# Patient Record
Sex: MALE | Race: WHITE | NOT HISPANIC OR LATINO | URBAN - METROPOLITAN AREA
[De-identification: names, ages, dates, MRNs, and addresses within clinical notes are randomized per-mention and may not be internally consistent; named-entity substitution may affect disease eponyms.]

---

## 2017-01-24 ENCOUNTER — OUTPATIENT (OUTPATIENT)
Dept: OUTPATIENT SERVICES | Facility: HOSPITAL | Age: 82
LOS: 1 days | End: 2017-01-24
Payer: COMMERCIAL

## 2017-01-24 ENCOUNTER — APPOINTMENT (OUTPATIENT)
Dept: CT IMAGING | Facility: CLINIC | Age: 82
End: 2017-01-24

## 2017-01-24 DIAGNOSIS — R63.4 ABNORMAL WEIGHT LOSS: ICD-10-CM

## 2017-01-24 PROCEDURE — 74176 CT ABD & PELVIS W/O CONTRAST: CPT

## 2017-02-11 ENCOUNTER — APPOINTMENT (OUTPATIENT)
Dept: MRI IMAGING | Facility: CLINIC | Age: 82
End: 2017-02-11

## 2017-02-11 ENCOUNTER — OUTPATIENT (OUTPATIENT)
Dept: OUTPATIENT SERVICES | Facility: HOSPITAL | Age: 82
LOS: 1 days | End: 2017-02-11
Payer: COMMERCIAL

## 2017-02-11 DIAGNOSIS — K86.9 DISEASE OF PANCREAS, UNSPECIFIED: ICD-10-CM

## 2017-02-11 PROCEDURE — 74182 MRI ABDOMEN W/CONTRAST: CPT

## 2017-02-11 PROCEDURE — A9585: CPT

## 2017-02-11 PROCEDURE — 74183 MRI ABD W/O CNTR FLWD CNTR: CPT

## 2017-02-11 PROCEDURE — 82565 ASSAY OF CREATININE: CPT

## 2017-03-01 ENCOUNTER — APPOINTMENT (OUTPATIENT)
Dept: SURGICAL ONCOLOGY | Facility: CLINIC | Age: 82
End: 2017-03-01

## 2017-03-01 VITALS
RESPIRATION RATE: 18 BRPM | SYSTOLIC BLOOD PRESSURE: 188 MMHG | WEIGHT: 130 LBS | DIASTOLIC BLOOD PRESSURE: 76 MMHG | HEIGHT: 67 IN | HEART RATE: 54 BPM | BODY MASS INDEX: 20.4 KG/M2 | OXYGEN SATURATION: 98 %

## 2017-03-01 DIAGNOSIS — Z87.39 PERSONAL HISTORY OF OTHER DISEASES OF THE MUSCULOSKELETAL SYSTEM AND CONNECTIVE TISSUE: ICD-10-CM

## 2017-03-01 DIAGNOSIS — Z80.3 FAMILY HISTORY OF MALIGNANT NEOPLASM OF BREAST: ICD-10-CM

## 2017-03-01 DIAGNOSIS — N40.0 BENIGN PROSTATIC HYPERPLASIA WITHOUT LOWER URINARY TRACT SYMPMS: ICD-10-CM

## 2017-03-01 DIAGNOSIS — E03.9 HYPOTHYROIDISM, UNSPECIFIED: ICD-10-CM

## 2017-03-01 DIAGNOSIS — Z87.891 PERSONAL HISTORY OF NICOTINE DEPENDENCE: ICD-10-CM

## 2017-03-01 DIAGNOSIS — N18.9 CHRONIC KIDNEY DISEASE, UNSPECIFIED: ICD-10-CM

## 2017-03-01 DIAGNOSIS — N28.89 OTHER SPECIFIED DISORDERS OF KIDNEY AND URETER: ICD-10-CM

## 2017-03-01 RX ORDER — GABAPENTIN 100 MG/1
100 CAPSULE ORAL
Refills: 0 | Status: ACTIVE | COMMUNITY

## 2017-03-01 RX ORDER — DONEPEZIL HYDROCHLORIDE 5 MG/1
5 TABLET ORAL
Refills: 0 | Status: ACTIVE | COMMUNITY

## 2017-03-01 RX ORDER — TAMSULOSIN HYDROCHLORIDE 0.4 MG/1
CAPSULE ORAL
Refills: 0 | Status: ACTIVE | COMMUNITY

## 2017-03-01 RX ORDER — CARVEDILOL 3.12 MG/1
TABLET, FILM COATED ORAL
Refills: 0 | Status: ACTIVE | COMMUNITY

## 2017-03-01 RX ORDER — FEBUXOSTAT 40 MG/1
40 TABLET ORAL
Refills: 0 | Status: ACTIVE | COMMUNITY

## 2017-03-01 RX ORDER — BETHANECHOL CHLORIDE 50 MG/1
TABLET ORAL
Refills: 0 | Status: ACTIVE | COMMUNITY

## 2017-03-01 RX ORDER — LEVOTHYROXINE SODIUM 0.05 MG/1
50 TABLET ORAL
Refills: 0 | Status: ACTIVE | COMMUNITY

## 2017-04-10 ENCOUNTER — OUTPATIENT (OUTPATIENT)
Dept: OUTPATIENT SERVICES | Facility: HOSPITAL | Age: 82
LOS: 1 days | End: 2017-04-10
Payer: COMMERCIAL

## 2017-04-10 VITALS
WEIGHT: 136.69 LBS | HEART RATE: 58 BPM | RESPIRATION RATE: 18 BRPM | DIASTOLIC BLOOD PRESSURE: 66 MMHG | HEIGHT: 66 IN | TEMPERATURE: 97 F | SYSTOLIC BLOOD PRESSURE: 144 MMHG

## 2017-04-10 DIAGNOSIS — Z01.818 ENCOUNTER FOR OTHER PREPROCEDURAL EXAMINATION: ICD-10-CM

## 2017-04-10 DIAGNOSIS — K86.9 DISEASE OF PANCREAS, UNSPECIFIED: ICD-10-CM

## 2017-04-10 DIAGNOSIS — Z98.890 OTHER SPECIFIED POSTPROCEDURAL STATES: Chronic | ICD-10-CM

## 2017-04-10 DIAGNOSIS — I10 ESSENTIAL (PRIMARY) HYPERTENSION: ICD-10-CM

## 2017-04-10 DIAGNOSIS — C67.9 MALIGNANT NEOPLASM OF BLADDER, UNSPECIFIED: ICD-10-CM

## 2017-04-10 LAB
ALBUMIN SERPL ELPH-MCNC: 4.2 G/DL — SIGNIFICANT CHANGE UP (ref 3.3–5.2)
ALP SERPL-CCNC: 55 U/L — SIGNIFICANT CHANGE UP (ref 40–120)
ALT FLD-CCNC: 7 U/L — SIGNIFICANT CHANGE UP
ANION GAP SERPL CALC-SCNC: 12 MMOL/L — SIGNIFICANT CHANGE UP (ref 5–17)
APTT BLD: 34.5 SEC — SIGNIFICANT CHANGE UP (ref 27.5–37.4)
AST SERPL-CCNC: 13 U/L — SIGNIFICANT CHANGE UP
BILIRUB SERPL-MCNC: 0.6 MG/DL — SIGNIFICANT CHANGE UP (ref 0.4–2)
BLD GP AB SCN SERPL QL: SIGNIFICANT CHANGE UP
BUN SERPL-MCNC: 29 MG/DL — HIGH (ref 8–20)
CALCIUM SERPL-MCNC: 9.9 MG/DL — SIGNIFICANT CHANGE UP (ref 8.6–10.2)
CHLORIDE SERPL-SCNC: 102 MMOL/L — SIGNIFICANT CHANGE UP (ref 98–107)
CO2 SERPL-SCNC: 29 MMOL/L — SIGNIFICANT CHANGE UP (ref 22–29)
CREAT SERPL-MCNC: 1.39 MG/DL — HIGH (ref 0.5–1.3)
GLUCOSE SERPL-MCNC: 118 MG/DL — HIGH (ref 70–115)
HCT VFR BLD CALC: 44.2 % — SIGNIFICANT CHANGE UP (ref 42–52)
HGB BLD-MCNC: 14.3 G/DL — SIGNIFICANT CHANGE UP (ref 14–18)
INR BLD: 1.06 RATIO — SIGNIFICANT CHANGE UP (ref 0.88–1.16)
MCHC RBC-ENTMCNC: 27.4 PG — SIGNIFICANT CHANGE UP (ref 27–31)
MCHC RBC-ENTMCNC: 32.4 G/DL — SIGNIFICANT CHANGE UP (ref 32–36)
MCV RBC AUTO: 84.7 FL — SIGNIFICANT CHANGE UP (ref 80–94)
PLATELET # BLD AUTO: 102 K/UL — LOW (ref 150–400)
POTASSIUM SERPL-MCNC: 3.9 MMOL/L — SIGNIFICANT CHANGE UP (ref 3.5–5.3)
POTASSIUM SERPL-SCNC: 3.9 MMOL/L — SIGNIFICANT CHANGE UP (ref 3.5–5.3)
PROT SERPL-MCNC: 7.5 G/DL — SIGNIFICANT CHANGE UP (ref 6.6–8.7)
PROTHROM AB SERPL-ACNC: 11.7 SEC — SIGNIFICANT CHANGE UP (ref 9.8–12.7)
RBC # BLD: 5.22 M/UL — SIGNIFICANT CHANGE UP (ref 4.6–6.2)
RBC # FLD: 14.2 % — SIGNIFICANT CHANGE UP (ref 11–15.6)
SODIUM SERPL-SCNC: 143 MMOL/L — SIGNIFICANT CHANGE UP (ref 135–145)
TYPE + AB SCN PNL BLD: SIGNIFICANT CHANGE UP
WBC # BLD: 7.8 K/UL — SIGNIFICANT CHANGE UP (ref 4.8–10.8)
WBC # FLD AUTO: 7.8 K/UL — SIGNIFICANT CHANGE UP (ref 4.8–10.8)

## 2017-04-10 PROCEDURE — 93010 ELECTROCARDIOGRAM REPORT: CPT

## 2017-04-10 NOTE — H&P PST ADULT - HISTORY OF PRESENT ILLNESS
This is a 89 y.o male who presents to PST today accompanied by his daughter.  The pt is alert, but is confused at times, therefore, his daughter is in attendance and assisting the patient in answering questions.  As per the pt's daughter, the pt sustained a substantial weight loss of 25-30 lbs over a few months accompanied by abdominal pain.   A cat scan demonstrated a pancreatic mass.

## 2017-04-10 NOTE — H&P PST ADULT - EKG AND INTERPRETATION
EKG demonstrates Sinus Bradycardia at 50bpm with first degree AV block.  Q waves noted in V1-V3.  Pending official reading

## 2017-04-10 NOTE — H&P PST ADULT - RS GEN PE MLT RESP DETAILS PC
airway patent/diminished breath sounds, R/normal/respirations non-labored/diminished breath sounds, L

## 2017-04-10 NOTE — H&P PST ADULT - ATTENDING COMMENTS
Planned procedure including risks and benefits discussed with patient.    Past Medical History:  Bladder cancer  H/O 2013  Dementia    Hypertension    Hypertrophy of prostate    Hypothyroidism    Pneumonia    Renal cyst    Risk factors for obstructive sleep apnea.    Past Surgical History:  H/O colonoscopy    H/O cystoscopy  Bladder Cancer 2013  H/O elbow surgery  As a child  H/O hernia repair  Inguinal Hernia surgery many years ago.

## 2017-04-10 NOTE — H&P PST ADULT - PMH
Bladder cancer  H/O 2013  Dementia    Hypertension    Hypertrophy of prostate    Hypothyroidism    Pneumonia    Renal cyst    Risk factors for obstructive sleep apnea

## 2017-04-10 NOTE — H&P PST ADULT - PROBLEM SELECTOR PLAN 1
Laparoscopic Robotic Assisted Distal, Pancreatectomy, Splenectomy, Possible Open   Medical Clearance

## 2017-04-10 NOTE — H&P PST ADULT - NSANTHOSAYNRD_GEN_A_CORE
No. JASMYN screening performed.  STOP BANG Legend: 0-2 = LOW Risk; 3-4 = INTERMEDIATE Risk; 5-8 = HIGH Risk

## 2017-04-10 NOTE — H&P PST ADULT - PSH
H/O colonoscopy    H/O cystoscopy  Bladder Cancer 2013  H/O elbow surgery  As a child  H/O hernia repair  Inguinal Hernia surgery many years ago

## 2017-04-11 DIAGNOSIS — Z01.818 ENCOUNTER FOR OTHER PREPROCEDURAL EXAMINATION: ICD-10-CM

## 2017-04-11 DIAGNOSIS — K86.9 DISEASE OF PANCREAS, UNSPECIFIED: ICD-10-CM

## 2017-04-23 ENCOUNTER — RESULT REVIEW (OUTPATIENT)
Age: 82
End: 2017-04-23

## 2017-04-24 ENCOUNTER — APPOINTMENT (OUTPATIENT)
Dept: SURGICAL ONCOLOGY | Facility: HOSPITAL | Age: 82
End: 2017-04-24

## 2017-04-24 ENCOUNTER — INPATIENT (INPATIENT)
Facility: HOSPITAL | Age: 82
LOS: 3 days | Discharge: HOME | DRG: 424 | End: 2017-04-28
Attending: SPECIALIST | Admitting: SPECIALIST
Payer: COMMERCIAL

## 2017-04-24 VITALS
OXYGEN SATURATION: 98 % | SYSTOLIC BLOOD PRESSURE: 159 MMHG | HEART RATE: 60 BPM | HEIGHT: 65 IN | DIASTOLIC BLOOD PRESSURE: 66 MMHG | RESPIRATION RATE: 16 BRPM | TEMPERATURE: 99 F | WEIGHT: 139.99 LBS

## 2017-04-24 DIAGNOSIS — Z90.81 ACQUIRED ABSENCE OF SPLEEN: Chronic | ICD-10-CM

## 2017-04-24 DIAGNOSIS — Z98.890 OTHER SPECIFIED POSTPROCEDURAL STATES: Chronic | ICD-10-CM

## 2017-04-24 DIAGNOSIS — K86.9 DISEASE OF PANCREAS, UNSPECIFIED: ICD-10-CM

## 2017-04-24 LAB
BLD GP AB SCN SERPL QL: SIGNIFICANT CHANGE UP
TYPE + AB SCN PNL BLD: SIGNIFICANT CHANGE UP

## 2017-04-24 PROCEDURE — 38747 REMOVE ABDOMINAL LYMPH NODES: CPT | Mod: AS,59

## 2017-04-24 PROCEDURE — G0463: CPT

## 2017-04-24 PROCEDURE — 86900 BLOOD TYPING SEROLOGIC ABO: CPT

## 2017-04-24 PROCEDURE — 80053 COMPREHEN METABOLIC PANEL: CPT

## 2017-04-24 PROCEDURE — 82150 ASSAY OF AMYLASE: CPT

## 2017-04-24 PROCEDURE — 88309 TISSUE EXAM BY PATHOLOGIST: CPT | Mod: 26

## 2017-04-24 PROCEDURE — 85730 THROMBOPLASTIN TIME PARTIAL: CPT

## 2017-04-24 PROCEDURE — 83690 ASSAY OF LIPASE: CPT

## 2017-04-24 PROCEDURE — 86920 COMPATIBILITY TEST SPIN: CPT

## 2017-04-24 PROCEDURE — 49204: CPT | Mod: AS

## 2017-04-24 PROCEDURE — S2900 ROBOTIC SURGICAL SYSTEM: CPT | Mod: NC

## 2017-04-24 PROCEDURE — 48140 PARTIAL REMOVAL OF PANCREAS: CPT | Mod: AS

## 2017-04-24 PROCEDURE — 86901 BLOOD TYPING SEROLOGIC RH(D): CPT

## 2017-04-24 PROCEDURE — 48140 PARTIAL REMOVAL OF PANCREAS: CPT

## 2017-04-24 PROCEDURE — 85610 PROTHROMBIN TIME: CPT

## 2017-04-24 PROCEDURE — 38747 REMOVE ABDOMINAL LYMPH NODES: CPT | Mod: 59

## 2017-04-24 PROCEDURE — 86850 RBC ANTIBODY SCREEN: CPT

## 2017-04-24 PROCEDURE — 93005 ELECTROCARDIOGRAM TRACING: CPT

## 2017-04-24 PROCEDURE — 83735 ASSAY OF MAGNESIUM: CPT

## 2017-04-24 PROCEDURE — 84100 ASSAY OF PHOSPHORUS: CPT

## 2017-04-24 PROCEDURE — 49204: CPT

## 2017-04-24 PROCEDURE — 85027 COMPLETE CBC AUTOMATED: CPT

## 2017-04-24 RX ORDER — CEFAZOLIN SODIUM 1 G
2000 VIAL (EA) INJECTION ONCE
Qty: 0 | Refills: 0 | Status: COMPLETED | OUTPATIENT
Start: 2017-04-24 | End: 2017-04-24

## 2017-04-24 RX ORDER — GABAPENTIN 400 MG/1
300 CAPSULE ORAL THREE TIMES A DAY
Qty: 0 | Refills: 0 | Status: DISCONTINUED | OUTPATIENT
Start: 2017-04-24 | End: 2017-04-28

## 2017-04-24 RX ORDER — TAMSULOSIN HYDROCHLORIDE 0.4 MG/1
0.8 CAPSULE ORAL AT BEDTIME
Qty: 0 | Refills: 0 | Status: DISCONTINUED | OUTPATIENT
Start: 2017-04-24 | End: 2017-04-28

## 2017-04-24 RX ORDER — FENTANYL CITRATE 50 UG/ML
25 INJECTION INTRAVENOUS
Qty: 0 | Refills: 0 | Status: DISCONTINUED | OUTPATIENT
Start: 2017-04-24 | End: 2017-04-24

## 2017-04-24 RX ORDER — SODIUM CHLORIDE 9 MG/ML
1000 INJECTION, SOLUTION INTRAVENOUS
Qty: 0 | Refills: 0 | Status: DISCONTINUED | OUTPATIENT
Start: 2017-04-24 | End: 2017-04-24

## 2017-04-24 RX ORDER — DONEPEZIL HYDROCHLORIDE 10 MG/1
5 TABLET, FILM COATED ORAL AT BEDTIME
Qty: 0 | Refills: 0 | Status: DISCONTINUED | OUTPATIENT
Start: 2017-04-24 | End: 2017-04-28

## 2017-04-24 RX ORDER — METOPROLOL TARTRATE 50 MG
5 TABLET ORAL EVERY 6 HOURS
Qty: 0 | Refills: 0 | Status: DISCONTINUED | OUTPATIENT
Start: 2017-04-24 | End: 2017-04-24

## 2017-04-24 RX ORDER — SODIUM CHLORIDE 9 MG/ML
1000 INJECTION, SOLUTION INTRAVENOUS
Qty: 0 | Refills: 0 | Status: DISCONTINUED | OUTPATIENT
Start: 2017-04-24 | End: 2017-04-27

## 2017-04-24 RX ORDER — ONDANSETRON 8 MG/1
4 TABLET, FILM COATED ORAL ONCE
Qty: 0 | Refills: 0 | Status: DISCONTINUED | OUTPATIENT
Start: 2017-04-24 | End: 2017-04-24

## 2017-04-24 RX ORDER — SODIUM CHLORIDE 9 MG/ML
3 INJECTION INTRAMUSCULAR; INTRAVENOUS; SUBCUTANEOUS EVERY 8 HOURS
Qty: 0 | Refills: 0 | Status: DISCONTINUED | OUTPATIENT
Start: 2017-04-24 | End: 2017-04-24

## 2017-04-24 RX ORDER — CARVEDILOL PHOSPHATE 80 MG/1
6.25 CAPSULE, EXTENDED RELEASE ORAL EVERY 12 HOURS
Qty: 0 | Refills: 0 | Status: DISCONTINUED | OUTPATIENT
Start: 2017-04-24 | End: 2017-04-28

## 2017-04-24 RX ORDER — CEFOTETAN DISODIUM 1 G
2 VIAL (EA) INJECTION ONCE
Qty: 0 | Refills: 0 | Status: COMPLETED | OUTPATIENT
Start: 2017-04-24 | End: 2017-04-24

## 2017-04-24 RX ORDER — LISINOPRIL 2.5 MG/1
10 TABLET ORAL DAILY
Qty: 0 | Refills: 0 | Status: DISCONTINUED | OUTPATIENT
Start: 2017-04-24 | End: 2017-04-28

## 2017-04-24 RX ORDER — LEVOTHYROXINE SODIUM 125 MCG
50 TABLET ORAL DAILY
Qty: 0 | Refills: 0 | Status: DISCONTINUED | OUTPATIENT
Start: 2017-04-24 | End: 2017-04-28

## 2017-04-24 RX ORDER — HEPARIN SODIUM 5000 [USP'U]/ML
5000 INJECTION INTRAVENOUS; SUBCUTANEOUS ONCE
Qty: 0 | Refills: 0 | Status: DISCONTINUED | OUTPATIENT
Start: 2017-04-24 | End: 2017-04-24

## 2017-04-24 RX ORDER — HEPARIN SODIUM 5000 [USP'U]/ML
5000 INJECTION INTRAVENOUS; SUBCUTANEOUS EVERY 8 HOURS
Qty: 0 | Refills: 0 | Status: COMPLETED | OUTPATIENT
Start: 2017-04-24 | End: 2017-04-24

## 2017-04-24 RX ORDER — AMLODIPINE BESYLATE 2.5 MG/1
5 TABLET ORAL DAILY
Qty: 0 | Refills: 0 | Status: DISCONTINUED | OUTPATIENT
Start: 2017-04-24 | End: 2017-04-28

## 2017-04-24 RX ADMIN — Medication 100 MILLIGRAM(S): at 12:48

## 2017-04-24 RX ADMIN — TAMSULOSIN HYDROCHLORIDE 0.8 MILLIGRAM(S): 0.4 CAPSULE ORAL at 21:20

## 2017-04-24 RX ADMIN — FENTANYL CITRATE 25 MICROGRAM(S): 50 INJECTION INTRAVENOUS at 13:28

## 2017-04-24 RX ADMIN — FENTANYL CITRATE 25 MICROGRAM(S): 50 INJECTION INTRAVENOUS at 12:55

## 2017-04-24 RX ADMIN — DONEPEZIL HYDROCHLORIDE 5 MILLIGRAM(S): 10 TABLET, FILM COATED ORAL at 21:20

## 2017-04-24 RX ADMIN — FENTANYL CITRATE 25 MICROGRAM(S): 50 INJECTION INTRAVENOUS at 15:06

## 2017-04-24 RX ADMIN — GABAPENTIN 300 MILLIGRAM(S): 400 CAPSULE ORAL at 21:20

## 2017-04-24 RX ADMIN — Medication 100 GRAM(S): at 19:47

## 2017-04-24 RX ADMIN — FENTANYL CITRATE 25 MICROGRAM(S): 50 INJECTION INTRAVENOUS at 15:01

## 2017-04-24 RX ADMIN — FENTANYL CITRATE 25 MICROGRAM(S): 50 INJECTION INTRAVENOUS at 13:53

## 2017-04-24 RX ADMIN — SODIUM CHLORIDE 100 MILLILITER(S): 9 INJECTION, SOLUTION INTRAVENOUS at 21:20

## 2017-04-24 NOTE — CONSULT NOTE ADULT - SUBJECTIVE AND OBJECTIVE BOX
HPI:  This is a 89 y.o male who presents to PST today accompanied by his daughter.  The pt is alert, but is confused at times, therefore, his daughter is in attendance and assisting the patient in answering questions.  As per the pt's daughter, the pt sustained a substantial weight loss of 25-30 lbs over a few months accompanied by abdominal pain.   A cat scan demonstrated a pancreatic mass.   Post Op    Home Medications:       · 	Aricept 5 mg oral tablet: Last Dose Taken:  , 1 tab(s) orally once a day (at bedtime)  · 	levothyroxine 50 mcg (0.05 mg) oral tablet: Last Dose Taken:  , 1 tab(s) orally once a day  · 	carvedilol 6.25 mg oral tablet: Last Dose Taken:  , 1 tab(s) orally once a day  · 	gabapentin 300 mg oral capsule: Last Dose Taken:  , 1 cap(s) orally 3 times a day  · 	tamsulosin: Last Dose Taken:  , 1  orally once a day (at bedtime)  · 	Uloric 40 mg oral tablet: 1 tab(s) orally once a day  · 	Vitamin D3 1000 intl units oral tablet: Last Dose Taken:  , 1 tab(s) orally once a day  · 	benazepril 20 mg oral tablet: Last Dose Taken:  , 1 tab(s) orally once a day  · 	amLODIPine 5 mg oral tablet: Last Dose Taken:  , 1 tab(s) orally once a day  · 	hydrochlorothiazide-triamterene 25 mg-37.5 mg oral tablet: Last Dose Taken:  , 1 tab(s) orally once a day  · 	potassium chloride 20 mEq oral tablet, extended release: Last Dose Taken:  ,  orally once a day    PAST MEDICAL & SURGICAL HISTORY:  Risk factors for obstructive sleep apnea  Renal cyst  Pneumonia  Dementia  Hypothyroidism  Bladder cancer: H/O 2013  Hypertrophy of prostate  Hypertension  H/O elbow surgery: As a child  H/O colonoscopy  H/O cystoscopy: Bladder Cancer 2013  H/O hernia repair: Inguinal Hernia surgery many years ago    heparin  Injectable 5000Unit(s) SubCutaneous every 8 hours  levothyroxine 50MICROGram(s) Oral daily  metoprolol Injectable 5milliGRAM(s) IV Push every 6 hours  oxyCODONE  5 mG/acetaminophen 325 mG 1Tablet(s) Oral every 6 hours PRN  cefoTEtan  IVPB 2Gram(s) IV Intermittent once    MEDICATIONS  (STANDING):  heparin  Injectable 5000Unit(s) SubCutaneous every 8 hours  levothyroxine 50MICROGram(s) Oral daily  metoprolol Injectable 5milliGRAM(s) IV Push every 6 hours  cefoTEtan  IVPB 2Gram(s) IV Intermittent once    MEDICATIONS  (PRN):  oxyCODONE  5 mG/acetaminophen 325 mG 1Tablet(s) Oral every 6 hours PRN Moderate Pain (4 - 6)      Allergies    No Known Allergies    Intolerances        SOCIAL HISTORY:  No S/D/ IVDU    FAMILY HISTORY:  Family history of breast cancer (Sibling)  No significant family history (Father, Mother)      ROS  - Headache  - Neck Stiffness  - Chest Pain  - SOB  - Abd pain  - Pelvic Pain  - Leg Pain  - Head Ache    Vital Signs Last 24 Hrs  T(C): 36.3, Max: 37 (04-24 @ 06:30)  T(F): 97.4, Max: 98.6 (04-24 @ 06:30)  HR: 87 (60 - 87)  BP: 154/70 (154/70 - 173/68)  BP(mean): --  RR: 19 (14 - 20)  SpO2: 97% (96% - 100%)    HEENT: PEARLA  Neck: Supple  Cardio: S1 S2 No Murmur  Pulm: CTA No Rales or Ronchi  Abd: Soft NT ND BS+  Rectal - refused  Ext: No DCT  Skin: No Rash  Neuro: Awake Pleasant      Dementia Aricept , will monitor for agitation  Hypothyriod - levothyroxine  HTN - carvedilol , Benzapril, Norvasc, restart HCTZ once IV fluids removed and diet advances       BPH - tamsulosin will increase and monitor for urinary retension with bladder scans    Gout -Uloric 40 mg oral tablet: 1 tab(s) orally once a day  SLeep Apnea -  HPI:  This is a 89 y.o male who presents to PST today accompanied by his daughter.  The pt is alert, but is confused at times, therefore, his daughter is in attendance and assisting the patient in answering questions.  As per the pt's daughter, the pt sustained a substantial weight loss of 25-30 lbs over a few months accompanied by abdominal pain.   A cat scan demonstrated a pancreatic mass.   Post Op    Home Medications:       · 	Aricept 5 mg oral tablet: Last Dose Taken:  , 1 tab(s) orally once a day (at bedtime)  · 	levothyroxine 50 mcg (0.05 mg) oral tablet: Last Dose Taken:  , 1 tab(s) orally once a day  · 	carvedilol 6.25 mg oral tablet: Last Dose Taken:  , 1 tab(s) orally once a day  · 	gabapentin 300 mg oral capsule: Last Dose Taken:  , 1 cap(s) orally 3 times a day  · 	tamsulosin: Last Dose Taken:  , 1  orally once a day (at bedtime)  · 	Uloric 40 mg oral tablet: 1 tab(s) orally once a day  · 	Vitamin D3 1000 intl units oral tablet: Last Dose Taken:  , 1 tab(s) orally once a day  · 	benazepril 20 mg oral tablet: Last Dose Taken:  , 1 tab(s) orally once a day  · 	amLODIPine 5 mg oral tablet: Last Dose Taken:  , 1 tab(s) orally once a day  · 	hydrochlorothiazide-triamterene 25 mg-37.5 mg oral tablet: Last Dose Taken:  , 1 tab(s) orally once a day  · 	potassium chloride 20 mEq oral tablet, extended release: Last Dose Taken:  ,  orally once a day    PAST MEDICAL & SURGICAL HISTORY:  Risk factors for obstructive sleep apnea  Renal cyst  Pneumonia  Dementia  Hypothyroidism  Bladder cancer: H/O 2013  Hypertrophy of prostate  Hypertension  H/O elbow surgery: As a child  H/O colonoscopy  H/O cystoscopy: Bladder Cancer 2013  H/O hernia repair: Inguinal Hernia surgery many years ago    heparin  Injectable 5000Unit(s) SubCutaneous every 8 hours  levothyroxine 50MICROGram(s) Oral daily  metoprolol Injectable 5milliGRAM(s) IV Push every 6 hours  oxyCODONE  5 mG/acetaminophen 325 mG 1Tablet(s) Oral every 6 hours PRN  cefoTEtan  IVPB 2Gram(s) IV Intermittent once    MEDICATIONS  (STANDING):  heparin  Injectable 5000Unit(s) SubCutaneous every 8 hours  levothyroxine 50MICROGram(s) Oral daily  metoprolol Injectable 5milliGRAM(s) IV Push every 6 hours  cefoTEtan  IVPB 2Gram(s) IV Intermittent once    MEDICATIONS  (PRN):  oxyCODONE  5 mG/acetaminophen 325 mG 1Tablet(s) Oral every 6 hours PRN Moderate Pain (4 - 6)      Allergies    No Known Allergies    Intolerances        SOCIAL HISTORY:  No S/D/ IVDU    FAMILY HISTORY:  Family history of breast cancer (Sibling)  No significant family history (Father, Mother)      ROS  - Headache  - Neck Stiffness  - Chest Pain  - SOB  Mild Abd pain  - Pelvic Pain  - Leg Pain  - Head Ache    Vital Signs Last 24 Hrs  T(C): 36.3, Max: 37 (04-24 @ 06:30)  T(F): 97.4, Max: 98.6 (04-24 @ 06:30)  HR: 87 (60 - 87)  BP: 154/70 (154/70 - 173/68)  BP(mean): --  RR: 19 (14 - 20)  SpO2: 97% (96% - 100%)    HEENT: PEARLA  Neck: Supple  Cardio: S1 S2 No Murmur  Pulm: CTA No Rales or Ronchi  Abd: Soft NT ND BS+  Rectal - refused  Ext: No DCT  Skin: No Rash  Neuro: Awake Pleasant Short Term memory loss      Dementia Aricept , will monitor for agitation  Hypothyriod - levothyroxine  HTN - carvedilol , Benzapril, Norvasc, restart HCTZ once IV fluids removed and diet advances       BPH - tamsulosin will increase and monitor for urinary retention with bladder scans    Gout -Uloric 40 mg oral tablet: 1 tab(s) orally once a day  SLeep Apnea -     Spoke with Family HPI:  This is a 89 y.o male who presents to PST today accompanied by his daughter.  The pt is alert, but is confused at times, therefore, his daughter is in attendance and assisting the patient in answering questions.  As per the pt's daughter, the pt sustained a substantial weight loss of 25-30 lbs over a few months accompanied by abdominal pain.   A cat scan demonstrated a pancreatic mass.   Post Op    Home Medications:       · 	Aricept 5 mg oral tablet: Last Dose Taken:  , 1 tab(s) orally once a day (at bedtime)  · 	levothyroxine 50 mcg (0.05 mg) oral tablet: Last Dose Taken:  , 1 tab(s) orally once a day  · 	carvedilol 6.25 mg oral tablet: Last Dose Taken:  , 1 tab(s) orally once a day  · 	gabapentin 300 mg oral capsule: Last Dose Taken:  , 1 cap(s) orally 3 times a day  · 	tamsulosin: Last Dose Taken:  , 1  orally once a day (at bedtime)  · 	Uloric 40 mg oral tablet: 1 tab(s) orally once a day  · 	Vitamin D3 1000 intl units oral tablet: Last Dose Taken:  , 1 tab(s) orally once a day  · 	benazepril 20 mg oral tablet: Last Dose Taken:  , 1 tab(s) orally once a day  · 	amLODIPine 5 mg oral tablet: Last Dose Taken:  , 1 tab(s) orally once a day  · 	hydrochlorothiazide-triamterene 25 mg-37.5 mg oral tablet: Last Dose Taken:  , 1 tab(s) orally once a day  · 	potassium chloride 20 mEq oral tablet, extended release: Last Dose Taken:  ,  orally once a day    PAST MEDICAL & SURGICAL HISTORY:  Risk factors for obstructive sleep apnea  Renal cyst  Pneumonia  Dementia  Hypothyroidism  Bladder cancer: H/O 2013  Hypertrophy of prostate  Hypertension  H/O elbow surgery: As a child  H/O colonoscopy  H/O cystoscopy: Bladder Cancer 2013  H/O hernia repair: Inguinal Hernia surgery many years ago    heparin  Injectable 5000Unit(s) SubCutaneous every 8 hours  levothyroxine 50MICROGram(s) Oral daily  metoprolol Injectable 5milliGRAM(s) IV Push every 6 hours  oxyCODONE  5 mG/acetaminophen 325 mG 1Tablet(s) Oral every 6 hours PRN  cefoTEtan  IVPB 2Gram(s) IV Intermittent once    MEDICATIONS  (STANDING):  heparin  Injectable 5000Unit(s) SubCutaneous every 8 hours  levothyroxine 50MICROGram(s) Oral daily  metoprolol Injectable 5milliGRAM(s) IV Push every 6 hours  cefoTEtan  IVPB 2Gram(s) IV Intermittent once    MEDICATIONS  (PRN):  oxyCODONE  5 mG/acetaminophen 325 mG 1Tablet(s) Oral every 6 hours PRN Moderate Pain (4 - 6)      Allergies    No Known Allergies    Intolerances        SOCIAL HISTORY:  No S/D/ IVDU    FAMILY HISTORY:  Family history of breast cancer (Sibling)  No significant family history (Father, Mother)      ROS  - Headache  - Neck Stiffness  - Chest Pain  - SOB  Mild Abd pain  - Pelvic Pain  - Leg Pain  - Head Ache    Vital Signs Last 24 Hrs  T(C): 36.3, Max: 37 (04-24 @ 06:30)  T(F): 97.4, Max: 98.6 (04-24 @ 06:30)  HR: 87 (60 - 87)  BP: 154/70 (154/70 - 173/68)  BP(mean): --  RR: 19 (14 - 20)  SpO2: 97% (96% - 100%)    HEENT: PEARLA  Neck: Supple  Cardio: S1 S2 No Murmur  Pulm: CTA No Rales or Ronchi  Abd: Soft NT ND BS dec, Incisions clean  Rectal - refused  Ext: No DCT  Skin: No Rash  Neuro: Awake Pleasant Short Term memory loss      Dementia Aricept , will monitor for agitation  Hypothyriod - levothyroxine  HTN - carvedilol , Benzapril, Norvasc, restart HCTZ once IV fluids removed and diet advances       BPH - tamsulosin will increase and monitor for urinary retention with bladder scans    Gout -Uloric 40 mg oral tablet: 1 tab(s) orally once a day  SLeep Apnea -     Spoke with Family

## 2017-04-24 NOTE — BRIEF OPERATIVE NOTE - PROCEDURE
Splenectomy  04/24/2017    Active  ROSEONNATI  Distal pancreatectomy  04/24/2017    Active  KARTIKATI  Robotic assisted procedure  04/24/2017    Active  ADONNATI

## 2017-04-24 NOTE — PROGRESS NOTE ADULT - SUBJECTIVE AND OBJECTIVE BOX
INTERVAL HPI/OVERNIGHT EVENTS: 89 yo male s/p splenectomy and distal pancreatectomy 4/24/17, POD #0. Patient endorses soreness of abdomen, no other complaints at this time. Denies chest pain, nausea, vomiting, shortness of breath.     MEDICATIONS  (STANDING):  levothyroxine 50MICROGram(s) Oral daily  lactated ringers 1000milliLiter(s) IV Continuous <Continuous>  gabapentin 300milliGRAM(s) Oral three times a day  carvedilol 6.25milliGRAM(s) Oral every 12 hours  donepezil 5milliGRAM(s) Oral at bedtime  tamsulosin 0.8milliGRAM(s) Oral at bedtime  lisinopril 10milliGRAM(s) Oral daily  amLODIPine   Tablet 5milliGRAM(s) Oral daily    MEDICATIONS  (PRN):  oxyCODONE  5 mG/acetaminophen 325 mG 1Tablet(s) Oral every 6 hours PRN Moderate Pain (4 - 6)      Vital Signs Last 24 Hrs  T(C): 36.7, Max: 37 (04-24 @ 06:30)  T(F): 98, Max: 98.6 (04-24 @ 06:30)  HR: 84 (60 - 87)  BP: 154/65 (120/60 - 173/68)  BP(mean): --  RR: 18 (14 - 20)  SpO2: 95% (95% - 100%)    PHYSICAL EXAM:      Constitutional: Sleeping comfortably in bed with family member at bedside, in NAD.     Eyes: EOMI     Respiratory: CTA, bilaterally. No adventitious breath sounds.     Cardiovascular: +S1S2.     Gastrointestinal: +BSx4, abdomen soft, non-distended, appropriately tender to palpation. Dressings C/D/I.     Genitourinary: alberto catheter in place, draining.           I&O's Detail    I & Os for current day (as of 24 Apr 2017 23:08)  =============================================  IN:    lactated ringers.: 600 ml    Total IN: 600 ml  ---------------------------------------------  OUT:    Indwelling Catheter - Urethral: 875 ml    Voided: 300 ml    Total OUT: 1175 ml  ---------------------------------------------  Total NET: -575 ml      LABS:                RADIOLOGY & ADDITIONAL STUDIES:

## 2017-04-25 ENCOUNTER — TRANSCRIPTION ENCOUNTER (OUTPATIENT)
Age: 82
End: 2017-04-25

## 2017-04-25 LAB
ANION GAP SERPL CALC-SCNC: 16 MMOL/L — SIGNIFICANT CHANGE UP (ref 5–17)
BASOPHILS # BLD AUTO: 0 K/UL — SIGNIFICANT CHANGE UP (ref 0–0.2)
BASOPHILS NFR BLD AUTO: 0 % — SIGNIFICANT CHANGE UP (ref 0–2)
BUN SERPL-MCNC: 23 MG/DL — HIGH (ref 8–20)
CALCIUM SERPL-MCNC: 8.3 MG/DL — LOW (ref 8.6–10.2)
CHLORIDE SERPL-SCNC: 98 MMOL/L — SIGNIFICANT CHANGE UP (ref 98–107)
CO2 SERPL-SCNC: 24 MMOL/L — SIGNIFICANT CHANGE UP (ref 22–29)
CREAT SERPL-MCNC: 1.33 MG/DL — HIGH (ref 0.5–1.3)
EOSINOPHIL NFR BLD AUTO: 1 % — SIGNIFICANT CHANGE UP (ref 0–6)
GLUCOSE SERPL-MCNC: 148 MG/DL — HIGH (ref 70–115)
HCT VFR BLD CALC: 39 % — LOW (ref 42–52)
HGB BLD-MCNC: 13 G/DL — LOW (ref 14–18)
LYMPHOCYTES # BLD AUTO: 1.5 K/UL — SIGNIFICANT CHANGE UP (ref 1–4.8)
LYMPHOCYTES # BLD AUTO: 8 % — LOW (ref 20–55)
MCHC RBC-ENTMCNC: 27.4 PG — SIGNIFICANT CHANGE UP (ref 27–31)
MCHC RBC-ENTMCNC: 33.3 G/DL — SIGNIFICANT CHANGE UP (ref 32–36)
MCV RBC AUTO: 82.1 FL — SIGNIFICANT CHANGE UP (ref 80–94)
MONOCYTES # BLD AUTO: 1.5 K/UL — HIGH (ref 0–0.8)
MONOCYTES NFR BLD AUTO: 7 % — SIGNIFICANT CHANGE UP (ref 3–10)
NEUTROPHILS # BLD AUTO: 20.6 K/UL — HIGH (ref 1.8–8)
NEUTROPHILS NFR BLD AUTO: 79 % — HIGH (ref 37–73)
PLATELET # BLD AUTO: 130 K/UL — LOW (ref 150–400)
POTASSIUM SERPL-MCNC: 3.5 MMOL/L — SIGNIFICANT CHANGE UP (ref 3.5–5.3)
POTASSIUM SERPL-SCNC: 3.5 MMOL/L — SIGNIFICANT CHANGE UP (ref 3.5–5.3)
RBC # BLD: 4.75 M/UL — SIGNIFICANT CHANGE UP (ref 4.6–6.2)
RBC # FLD: 13.8 % — SIGNIFICANT CHANGE UP (ref 11–15.6)
SODIUM SERPL-SCNC: 138 MMOL/L — SIGNIFICANT CHANGE UP (ref 135–145)
WBC # BLD: 23.7 K/UL — HIGH (ref 4.8–10.8)
WBC # FLD AUTO: 23.7 K/UL — HIGH (ref 4.8–10.8)

## 2017-04-25 PROCEDURE — 71010: CPT | Mod: 26

## 2017-04-25 RX ORDER — ACETAMINOPHEN 500 MG
650 TABLET ORAL EVERY 6 HOURS
Qty: 0 | Refills: 0 | Status: DISCONTINUED | OUTPATIENT
Start: 2017-04-25 | End: 2017-04-27

## 2017-04-25 RX ORDER — HEPARIN SODIUM 5000 [USP'U]/ML
5000 INJECTION INTRAVENOUS; SUBCUTANEOUS EVERY 8 HOURS
Qty: 0 | Refills: 0 | Status: DISCONTINUED | OUTPATIENT
Start: 2017-04-25 | End: 2017-04-25

## 2017-04-25 RX ORDER — HYDROMORPHONE HYDROCHLORIDE 2 MG/ML
0.5 INJECTION INTRAMUSCULAR; INTRAVENOUS; SUBCUTANEOUS EVERY 6 HOURS
Qty: 0 | Refills: 0 | Status: DISCONTINUED | OUTPATIENT
Start: 2017-04-25 | End: 2017-04-28

## 2017-04-25 RX ORDER — HEPARIN SODIUM 5000 [USP'U]/ML
5000 INJECTION INTRAVENOUS; SUBCUTANEOUS ONCE
Qty: 0 | Refills: 0 | Status: COMPLETED | OUTPATIENT
Start: 2017-04-25 | End: 2017-04-25

## 2017-04-25 RX ADMIN — AMLODIPINE BESYLATE 5 MILLIGRAM(S): 2.5 TABLET ORAL at 05:36

## 2017-04-25 RX ADMIN — CARVEDILOL PHOSPHATE 6.25 MILLIGRAM(S): 80 CAPSULE, EXTENDED RELEASE ORAL at 17:55

## 2017-04-25 RX ADMIN — GABAPENTIN 300 MILLIGRAM(S): 400 CAPSULE ORAL at 16:07

## 2017-04-25 RX ADMIN — TAMSULOSIN HYDROCHLORIDE 0.8 MILLIGRAM(S): 0.4 CAPSULE ORAL at 21:04

## 2017-04-25 RX ADMIN — DONEPEZIL HYDROCHLORIDE 5 MILLIGRAM(S): 10 TABLET, FILM COATED ORAL at 21:04

## 2017-04-25 RX ADMIN — HEPARIN SODIUM 5000 UNIT(S): 5000 INJECTION INTRAVENOUS; SUBCUTANEOUS at 21:03

## 2017-04-25 RX ADMIN — GABAPENTIN 300 MILLIGRAM(S): 400 CAPSULE ORAL at 21:04

## 2017-04-25 NOTE — PHYSICAL THERAPY INITIAL EVALUATION ADULT - GAIT TRAINING, PT EVAL
Time Frame:  3 days   Goal:   Independent   with RW x 300 feet / Stairs: pt will navigate 9   stairs with  1  rail

## 2017-04-25 NOTE — PROGRESS NOTE ADULT - SUBJECTIVE AND OBJECTIVE BOX
HPI:  This is a 89 y.o male who presents to PST today accompanied by his daughter.  The pt is alert, but is confused at times, therefore, his daughter is in attendance and assisting the patient in answering questions.  As per the pt's daughter, the pt sustained a substantial weight loss of 25-30 lbs over a few months accompanied by abdominal pain.   A cat scan demonstrated a pancreatic mass. (10 Apr 2017 07:27)     Allergies    No Known Allergies    Intolerances      Risk factors for obstructive sleep apnea  Renal cyst  Pneumonia  Dementia  Hypothyroidism  Bladder cancer  Hypertrophy of prostate  Hypertension    MEDICATIONS  (STANDING):  levothyroxine 50MICROGram(s) Oral daily  lactated ringers 1000milliLiter(s) IV Continuous <Continuous>  gabapentin 300milliGRAM(s) Oral three times a day  carvedilol 6.25milliGRAM(s) Oral every 12 hours  donepezil 5milliGRAM(s) Oral at bedtime  tamsulosin 0.8milliGRAM(s) Oral at bedtime  lisinopril 10milliGRAM(s) Oral daily  amLODIPine   Tablet 5milliGRAM(s) Oral daily  heparin  Injectable 5000Unit(s) SubCutaneous once    MEDICATIONS  (PRN):  oxyCODONE  5 mG/acetaminophen 325 mG 1Tablet(s) Oral every 4 hours PRN Moderate Pain (4 - 6)  oxyCODONE  5 mG/acetaminophen 325 mG 2Tablet(s) Oral every 4 hours PRN Severe Pain (7 - 10)  acetaminophen   Tablet. 650milliGRAM(s) Oral every 6 hours PRN Mild Pain (1 - 3)  HYDROmorphone  Injectable 0.5milliGRAM(s) IV Push every 6 hours PRN BREAKTHROUGH PAIN ONLY                           13.0   23.7  )-----------( 130      ( 25 Apr 2017 05:56 )             39.0     04-25    138  |  98  |  23.0  ----------------------------<  148  3.5   |  24.0  |  1.33    Ca    8.3<L>      25 Apr 2017 05:56        ;  Vital Signs Last 24 Hrs  T(C): 36.9, Max: 37.1 (04-25 @ 04:35)  T(F): 98.4, Max: 98.7 (04-25 @ 04:35)  HR: 72 (68 - 90)  BP: 143/51 (136/47 - 174/67)  BP(mean): --  RR: 17 (17 - 18)  SpO2: 96% (95% - 97%)      I & Os for 24h ending 04-25 @ 07:00  =============================================  IN: 1850 ml / OUT: 1725 ml / NET: 125 ml    I & Os for current day (as of 04-25 @ 20:37)  =============================================  IN: 1100 ml / OUT: 670 ml / NET: 430 ml    Patient has mild Abd Pain No CP, No SOB, No N/V Small Flatus    HEENT: PEARLA  Neck: Supple  Cardio: S1 S2 No Murmur  Pulm: CTA No Rales or Ronchi  Abd: Soft NT ND BS dec, Incisions clean  Rectal - refused  Ext: No DCT  Skin: No Rash  Neuro: Awake Pleasant Short Term memory loss      Dementia Aricept , will monitor for agitation  Hypothyriod - levothyroxine  HTN - carvedilol , Benzapril, Norvasc, restart HCTZ once IV fluids removed and diet advances       BPH - tamsulosin will increase and monitor for urinary retention with bladder scans once alberto removed  Hyperglycemmia - Mild secondary to stress    Gout -Uloric 40 mg oral tablet: 1 tab(s) orally once a day     SLeep Apnea -  HPI:  This is a 89 y.o male who presents to PST today accompanied by his daughter.  The pt is alert, but is confused at times, therefore, his daughter is in attendance and assisting the patient in answering questions.  As per the pt's daughter, the pt sustained a substantial weight loss of 25-30 lbs over a few months accompanied by abdominal pain.   A cat scan demonstrated a pancreatic mass. (10 Apr 2017 07:27)     Allergies    No Known Allergies    Intolerances      Risk factors for obstructive sleep apnea  Renal cyst  Pneumonia  Dementia  Hypothyroidism  Bladder cancer  Hypertrophy of prostate  Hypertension    MEDICATIONS  (STANDING):  levothyroxine 50MICROGram(s) Oral daily  lactated ringers 1000milliLiter(s) IV Continuous <Continuous>  gabapentin 300milliGRAM(s) Oral three times a day  carvedilol 6.25milliGRAM(s) Oral every 12 hours  donepezil 5milliGRAM(s) Oral at bedtime  tamsulosin 0.8milliGRAM(s) Oral at bedtime  lisinopril 10milliGRAM(s) Oral daily  amLODIPine   Tablet 5milliGRAM(s) Oral daily  heparin  Injectable 5000Unit(s) SubCutaneous once    MEDICATIONS  (PRN):  oxyCODONE  5 mG/acetaminophen 325 mG 1Tablet(s) Oral every 4 hours PRN Moderate Pain (4 - 6)  oxyCODONE  5 mG/acetaminophen 325 mG 2Tablet(s) Oral every 4 hours PRN Severe Pain (7 - 10)  acetaminophen   Tablet. 650milliGRAM(s) Oral every 6 hours PRN Mild Pain (1 - 3)  HYDROmorphone  Injectable 0.5milliGRAM(s) IV Push every 6 hours PRN BREAKTHROUGH PAIN ONLY                           13.0   23.7  )-----------( 130      ( 25 Apr 2017 05:56 )             39.0     04-25    138  |  98  |  23.0  ----------------------------<  148  3.5   |  24.0  |  1.33    Ca    8.3<L>      25 Apr 2017 05:56        ;  Vital Signs Last 24 Hrs  T(C): 36.9, Max: 37.1 (04-25 @ 04:35)  T(F): 98.4, Max: 98.7 (04-25 @ 04:35)  HR: 72 (68 - 90)  BP: 143/51 (136/47 - 174/67)  BP(mean): --  RR: 17 (17 - 18)  SpO2: 96% (95% - 97%)      I & Os for 24h ending 04-25 @ 07:00  =============================================  IN: 1850 ml / OUT: 1725 ml / NET: 125 ml    I & Os for current day (as of 04-25 @ 20:37)  =============================================  IN: 1100 ml / OUT: 670 ml / NET: 430 ml    Patient has mild Abd Pain No CP, No SOB, No N/V Small Flatus    HEENT: PEARLA  Neck: Supple  Cardio: S1 S2 No Murmur  Pulm: CTA No Rales or Ronchi  Abd: Soft NT ND BS dec, Incisions clean  Rectal - refused  Ext: No DCT  Skin: No Rash  Neuro: Awake Pleasant Short Term memory loss      Pancreatic Cancer- POD#1, Pain control  Dementia Aricept , will monitor for agitation  Hypothyriod - levothyroxine  HTN - carvedilol , Benzapril, Norvasc, restart HCTZ once IV fluids removed and diet advances       BPH - tamsulosin will increase and monitor for urinary retention with bladder scans once alberto removed  Hyperglycemmia - Mild secondary to stress    Gout -Uloric 40 mg oral tablet: 1 tab(s) orally once a day     SLeep Apnea -

## 2017-04-25 NOTE — PROGRESS NOTE ADULT - SUBJECTIVE AND OBJECTIVE BOX
SURGICAL PA NOTE:     STATUS POST:      Diagnosis:   Pre-Op Diagnosis:  Pancreatic mass  04/24/2017    Active  Angeles Storm.    Post-Op Dx:  Pancreatic mass  04/24/2017    Active  Angeles Storm.    Procedure:   Procedure:  Splenectomy  04/24/2017    Active  ADLUIS  Distal pancreatectomy  04/24/2017    Active  BEAR  Robotic assisted procedure  04/24/2017    Active  BEAR.      Operative Findings:  · Operative Findings	mass on tail of pancreas	      POST OPERATIVE DAY #: 1    Vital Signs Last 24 Hrs  T(C): 36.7, Max: 37.1 (04-25 @ 04:35)  T(F): 98.1, Max: 98.7 (04-25 @ 04:35)  HR: 68 (68 - 90)  BP: 153/53 (120/60 - 173/68)  BP(mean): --  RR: 18 (14 - 20)  SpO2: 97% (95% - 100%)    HPI:  This is a 89 y.o male who presents to PST today accompanied by his daughter.  The pt is alert, but is confused at times, therefore, his daughter is in attendance and assisting the patient in answering questions.  As per the pt's daughter, the pt sustained a substantial weight loss of 25-30 lbs over a few months accompanied by abdominal pain.   A cat scan demonstrated a pancreatic mass. (10 Apr 2017 07:27)      Disorder of pancreas  No h/o HF  Family history of breast cancer (Sibling)  No significant family history (Father, Mother)  Handoff  MEWS Score  Risk factors for obstructive sleep apnea  Renal cyst  Pneumonia  Dementia  Hypothyroidism  Bladder cancer  Hypertrophy of prostate  Hypertension  Pancreatic mass  Pancreatic mass  Bladder cancer  Hypertension  Pancreatic mass  Robotic assisted procedure  Distal pancreatectomy  Splenectomy  H/O elbow surgery  H/O colonoscopy  H/O cystoscopy  H/O hernia repair  DISEASE OF PANCREAS, UNSPECIFI      SUBJECTIVE: Pt seen lying supine with HOB up, c/o dry throat, minimal c/o abd pain, no nausea/vomiting, no flatus    Diet: NPO    Activity: bedrest overnight    Fevers: [ ]Yes [x ]NO  Chills: [ ] Yes [x ] NO  SOB:  [ ] YES [ x] NO  Dyspnea: [ ]YES [x ]NO  Chest Discomfort: [ ] YES [x ] NO    Nausea: [ ] YES [x ] NO           Vomiting: [ ] YES [ x] NO  Flatus: [ ] YES [x ] NO             Bowel Movement: [ ] YES [x ] NO  Diarrhea: [ ] YES [ x] NO         Void: [ ]YES [ ]No  Constipation: [ ] YES [ ] NO       Pain (0-10):       2       Pain Control Adequate: [x ] YES [ ] NO    Parra: ndwelling    NGT:      I&O's Detail    I & Os for current day (as of 25 Apr 2017 09:15)  =============================================  IN:    lactated ringers: 1200 ml    lactated ringers.: 600 ml    Solution: 50 ml    Total IN: 1850 ml  ---------------------------------------------  OUT:    Indwelling Catheter - Urethral: 1425 ml    Voided: 300 ml    Total OUT: 1725 ml  ---------------------------------------------  Total NET: 125 ml    I&O's Summary    I & Os for current day (as of 25 Apr 2017 09:15)  =============================================  IN: 1850 ml / OUT: 1725 ml / NET: 125 ml    I&O's Detail    I & Os for current day (as of 25 Apr 2017 09:15)  =============================================  IN:    lactated ringers: 1200 ml    lactated ringers.: 600 ml    Solution: 50 ml    Total IN: 1850 ml  ---------------------------------------------  OUT:    Indwelling Catheter - Urethral: 1425 ml    Voided: 300 ml    Total OUT: 1725 ml  ---------------------------------------------  Total NET: 125 ml      MEDICATIONS  (STANDING):  levothyroxine 50MICROGram(s) Oral daily  lactated ringers 1000milliLiter(s) IV Continuous <Continuous>  gabapentin 300milliGRAM(s) Oral three times a day  carvedilol 6.25milliGRAM(s) Oral every 12 hours  donepezil 5milliGRAM(s) Oral at bedtime  tamsulosin 0.8milliGRAM(s) Oral at bedtime  lisinopril 10milliGRAM(s) Oral daily  amLODIPine   Tablet 5milliGRAM(s) Oral daily    MEDICATIONS  (PRN):  oxyCODONE  5 mG/acetaminophen 325 mG 1Tablet(s) Oral every 4 hours PRN Moderate Pain (4 - 6)  oxyCODONE  5 mG/acetaminophen 325 mG 2Tablet(s) Oral every 4 hours PRN Severe Pain (7 - 10)  acetaminophen   Tablet. 650milliGRAM(s) Oral every 6 hours PRN Mild Pain (1 - 3)  HYDROmorphone  Injectable 0.5milliGRAM(s) IV Push every 6 hours PRN BREAKTHROUGH PAIN ONLY      LABS:                        13.0   23.7  )-----------( 130      ( 25 Apr 2017 05:56 )             39.0     04-25    138  |  98  |  23.0<H>  ----------------------------<  148<H>  3.5   |  24.0  |  1.33<H>    Ca    8.3<L>      25 Apr 2017 05:56              RADIOLOGY & ADDITIONAL STUDIES:

## 2017-04-25 NOTE — SWALLOW BEDSIDE ASSESSMENT ADULT - PHARYNGEAL PHASE
Throat clear post oral intake/Multiple swallows/Wet vocal quality post oral intake/Complaints of pharyngeal stasis Wet vocal quality post oral intake/Cough post oral intake/Multiple swallows Multiple swallows/Throat clear post oral intake/Wet vocal quality post oral intake

## 2017-04-25 NOTE — PHYSICAL THERAPY INITIAL EVALUATION ADULT - ADDITIONAL COMMENTS
Pt lives in a house with 4 steps to enter with 1 rails and 5  stairs inside with 1 rail  Pt owns medical equipment: None   Pt lives with: Spouse

## 2017-04-25 NOTE — SWALLOW BEDSIDE ASSESSMENT ADULT - SWALLOW EVAL: DIAGNOSIS
Oral stage WFL. Pharyngeal dysphagia suspected for puree, thin fluids & nectar thick fluids (pt declined honey thick), with +overt s/s of aspiration post intake

## 2017-04-25 NOTE — SWALLOW BEDSIDE ASSESSMENT ADULT - ASR SWALLOW ASPIRATION MONITOR
upper respiratory infection/fever/throat clearing/oral hygiene/gurgly voice/pneumonia/cough/change of breathing pattern/position upright (89Y)

## 2017-04-25 NOTE — PROGRESS NOTE ADULT - SUBJECTIVE AND OBJECTIVE BOX
Anesthesia Post OP    Pt s/p Robotic Lap Pancreatectomy day 1. Pt awake , alert , VSS. Pt denies any N/V or pain >3/10. Pt resting comfortable. No anesthesia complications noted.

## 2017-04-26 LAB
ANION GAP SERPL CALC-SCNC: 15 MMOL/L — SIGNIFICANT CHANGE UP (ref 5–17)
BUN SERPL-MCNC: 20 MG/DL — SIGNIFICANT CHANGE UP (ref 8–20)
CALCIUM SERPL-MCNC: 8.9 MG/DL — SIGNIFICANT CHANGE UP (ref 8.6–10.2)
CHLORIDE SERPL-SCNC: 100 MMOL/L — SIGNIFICANT CHANGE UP (ref 98–107)
CO2 SERPL-SCNC: 27 MMOL/L — SIGNIFICANT CHANGE UP (ref 22–29)
CREAT SERPL-MCNC: 1.21 MG/DL — SIGNIFICANT CHANGE UP (ref 0.5–1.3)
GLUCOSE SERPL-MCNC: 106 MG/DL — SIGNIFICANT CHANGE UP (ref 70–115)
HCT VFR BLD CALC: 36 % — LOW (ref 42–52)
HGB BLD-MCNC: 11.9 G/DL — LOW (ref 14–18)
MCHC RBC-ENTMCNC: 27.4 PG — SIGNIFICANT CHANGE UP (ref 27–31)
MCHC RBC-ENTMCNC: 33.1 G/DL — SIGNIFICANT CHANGE UP (ref 32–36)
MCV RBC AUTO: 82.9 FL — SIGNIFICANT CHANGE UP (ref 80–94)
PLATELET # BLD AUTO: 191 K/UL — SIGNIFICANT CHANGE UP (ref 150–400)
POTASSIUM SERPL-MCNC: 3.4 MMOL/L — LOW (ref 3.5–5.3)
POTASSIUM SERPL-SCNC: 3.4 MMOL/L — LOW (ref 3.5–5.3)
RBC # BLD: 4.34 M/UL — LOW (ref 4.6–6.2)
RBC # FLD: 14.2 % — SIGNIFICANT CHANGE UP (ref 11–15.6)
SODIUM SERPL-SCNC: 142 MMOL/L — SIGNIFICANT CHANGE UP (ref 135–145)
WBC # BLD: 25.9 K/UL — HIGH (ref 4.8–10.8)
WBC # FLD AUTO: 25.9 K/UL — HIGH (ref 4.8–10.8)

## 2017-04-26 RX ORDER — HYDROMORPHONE HYDROCHLORIDE 2 MG/ML
0.5 INJECTION INTRAMUSCULAR; INTRAVENOUS; SUBCUTANEOUS ONCE
Qty: 0 | Refills: 0 | Status: DISCONTINUED | OUTPATIENT
Start: 2017-04-26 | End: 2017-04-26

## 2017-04-26 RX ORDER — HEPARIN SODIUM 5000 [USP'U]/ML
5000 INJECTION INTRAVENOUS; SUBCUTANEOUS EVERY 8 HOURS
Qty: 0 | Refills: 0 | Status: DISCONTINUED | OUTPATIENT
Start: 2017-04-26 | End: 2017-04-28

## 2017-04-26 RX ADMIN — SODIUM CHLORIDE 100 MILLILITER(S): 9 INJECTION, SOLUTION INTRAVENOUS at 18:52

## 2017-04-26 RX ADMIN — HYDROMORPHONE HYDROCHLORIDE 0.5 MILLIGRAM(S): 2 INJECTION INTRAMUSCULAR; INTRAVENOUS; SUBCUTANEOUS at 09:01

## 2017-04-26 RX ADMIN — AMLODIPINE BESYLATE 5 MILLIGRAM(S): 2.5 TABLET ORAL at 05:10

## 2017-04-26 RX ADMIN — TAMSULOSIN HYDROCHLORIDE 0.8 MILLIGRAM(S): 0.4 CAPSULE ORAL at 22:17

## 2017-04-26 RX ADMIN — HYDROMORPHONE HYDROCHLORIDE 0.5 MILLIGRAM(S): 2 INJECTION INTRAMUSCULAR; INTRAVENOUS; SUBCUTANEOUS at 14:50

## 2017-04-26 RX ADMIN — HEPARIN SODIUM 5000 UNIT(S): 5000 INJECTION INTRAVENOUS; SUBCUTANEOUS at 22:05

## 2017-04-26 RX ADMIN — HEPARIN SODIUM 5000 UNIT(S): 5000 INJECTION INTRAVENOUS; SUBCUTANEOUS at 15:32

## 2017-04-26 RX ADMIN — GABAPENTIN 300 MILLIGRAM(S): 400 CAPSULE ORAL at 05:09

## 2017-04-26 RX ADMIN — CARVEDILOL PHOSPHATE 6.25 MILLIGRAM(S): 80 CAPSULE, EXTENDED RELEASE ORAL at 18:44

## 2017-04-26 RX ADMIN — CARVEDILOL PHOSPHATE 6.25 MILLIGRAM(S): 80 CAPSULE, EXTENDED RELEASE ORAL at 05:10

## 2017-04-26 RX ADMIN — HYDROMORPHONE HYDROCHLORIDE 0.5 MILLIGRAM(S): 2 INJECTION INTRAMUSCULAR; INTRAVENOUS; SUBCUTANEOUS at 08:41

## 2017-04-26 RX ADMIN — LISINOPRIL 10 MILLIGRAM(S): 2.5 TABLET ORAL at 05:09

## 2017-04-26 RX ADMIN — Medication 50 MICROGRAM(S): at 05:10

## 2017-04-26 RX ADMIN — HYDROMORPHONE HYDROCHLORIDE 0.5 MILLIGRAM(S): 2 INJECTION INTRAMUSCULAR; INTRAVENOUS; SUBCUTANEOUS at 15:00

## 2017-04-26 NOTE — PROGRESS NOTE ADULT - SUBJECTIVE AND OBJECTIVE BOX
INTERVAL HPI/OVERNIGHT EVENTS: No events over night. Patient c/o abdominal pain. Failed bedside speech and swallow yesterday, patient states he was very dry and has a foreign body sensation in the throat.     STATUS POST: Lap RA distal pancreatectomy and splenectomy     POST OPERATIVE DAY #: 2    SUBJECTIVE:  Flatus: [ ] YES [x ] NO             Bowel Movement: [ ] YES [x] NO  Pain Control Adequate: [ ] YES [x] NO  Nausea: [ ] YES [x] NO            Vomiting: [ ] YES [x] NO  Diarrhea: [ ] YES [x] NO         Constipation: [ ] YES [x] NO     Chest Pain: [ ] YES [x] NO    SOB:  [ ] YES [ x NO    MEDICATIONS  (STANDING):  levothyroxine 50MICROGram(s) Oral daily  lactated ringers 1000milliLiter(s) IV Continuous <Continuous>  gabapentin 300milliGRAM(s) Oral three times a day  carvedilol 6.25milliGRAM(s) Oral every 12 hours  donepezil 5milliGRAM(s) Oral at bedtime  tamsulosin 0.8milliGRAM(s) Oral at bedtime  lisinopril 10milliGRAM(s) Oral daily  amLODIPine   Tablet 5milliGRAM(s) Oral daily    MEDICATIONS  (PRN):  oxyCODONE  5 mG/acetaminophen 325 mG 1Tablet(s) Oral every 4 hours PRN Moderate Pain (4 - 6)  oxyCODONE  5 mG/acetaminophen 325 mG 2Tablet(s) Oral every 4 hours PRN Severe Pain (7 - 10)  acetaminophen   Tablet. 650milliGRAM(s) Oral every 6 hours PRN Mild Pain (1 - 3)  HYDROmorphone  Injectable 0.5milliGRAM(s) IV Push every 6 hours PRN BREAKTHROUGH PAIN ONLY      Vital Signs Last 24 Hrs  T(C): 37.1, Max: 37.1 (04-26 @ 05:45)  T(F): 98.8, Max: 98.8 (04-26 @ 05:45)  HR: 77 (69 - 78)  BP: 167/70 (143/51 - 174/67)  BP(mean): --  RR: 17 (16 - 17)  SpO2: 94% (94% - 97%)    PHYSICAL EXAM:      Constitutional: AOx3, NAD    Eyes: PERRLA    Respiratory: CTABL    Cardiovascular: RRR    Gastrointestinal: Abdomen soft, appropriately TTP, ND    Genitourinary: u/o adequate, clear yellow     Surgical sites C/D/I         I&O's Detail    I & Os for current day (as of 26 Apr 2017 07:52)  =============================================  IN:    lactated ringers: 2300 ml    Total IN: 2300 ml  ---------------------------------------------  OUT:    Indwelling Catheter - Urethral: 1595 ml    Total OUT: 1595 ml  ---------------------------------------------  Total NET: 705 ml      LABS: Pending 4/26                        13.0   23.7  )-----------( 130      ( 25 Apr 2017 05:56 )             39.0     04-25    138  |  98  |  23.0<H>  ----------------------------<  148<H>  3.5   |  24.0  |  1.33<H>    Ca    8.3<L>      25 Apr 2017 05:56            RADIOLOGY & ADDITIONAL STUDIES:

## 2017-04-26 NOTE — PROGRESS NOTE ADULT - SUBJECTIVE AND OBJECTIVE BOX
HPI:  This is a 89 y.o male who presents to PST today accompanied by his daughter.  The pt is alert, but is confused at times, therefore, his daughter is in attendance and assisting the patient in answering questions.  As per the pt's daughter, the pt sustained a substantial weight loss of 25-30 lbs over a few months accompanied by abdominal pain.   A cat scan demonstrated a pancreatic mass. (10 Apr 2017 07:27)     Allergies    No Known Allergies    Intolerances      Risk factors for obstructive sleep apnea  Renal cyst  Pneumonia  Dementia  Hypothyroidism  Bladder cancer  Hypertrophy of prostate  Hypertension    MEDICATIONS  (STANDING):  levothyroxine 50MICROGram(s) Oral daily  lactated ringers 1000milliLiter(s) IV Continuous <Continuous>  gabapentin 300milliGRAM(s) Oral three times a day  carvedilol 6.25milliGRAM(s) Oral every 12 hours  donepezil 5milliGRAM(s) Oral at bedtime  tamsulosin 0.8milliGRAM(s) Oral at bedtime  lisinopril 10milliGRAM(s) Oral daily  amLODIPine   Tablet 5milliGRAM(s) Oral daily  heparin  Injectable 5000Unit(s) SubCutaneous every 8 hours    MEDICATIONS  (PRN):  oxyCODONE  5 mG/acetaminophen 325 mG 1Tablet(s) Oral every 4 hours PRN Moderate Pain (4 - 6)  oxyCODONE  5 mG/acetaminophen 325 mG 2Tablet(s) Oral every 4 hours PRN Severe Pain (7 - 10)  acetaminophen   Tablet. 650milliGRAM(s) Oral every 6 hours PRN Mild Pain (1 - 3)  HYDROmorphone  Injectable 0.5milliGRAM(s) IV Push every 6 hours PRN BREAKTHROUGH PAIN ONLY                           11.9   25.9  )-----------( 191      ( 26 Apr 2017 12:08 )             36.0     04-26    142  |  100  |  20.0  ----------------------------<  106  3.4<L>   |  27.0  |  1.21    Ca    8.9      26 Apr 2017 12:08        ;  Vital Signs Last 24 Hrs  T(C): 37, Max: 37.1 (04-26 @ 05:45)  T(F): 98.6, Max: 98.8 (04-26 @ 05:45)  HR: 82 (69 - 82)  BP: 151/55 (149/55 - 167/70)  BP(mean): --  RR: 18 (16 - 18)  SpO2: 94% (94% - 96%)  CAPILLARY BLOOD GLUCOSE    I & Os for 24h ending 04-26 @ 07:00  =============================================  IN: 2300 ml / OUT: 1595 ml / NET: 705 ml    I & Os for current day (as of 04-26 @ 19:05)  =============================================  IN: 0 ml / OUT: 550 ml / NET: -550 ml    Patient has mild Abd Pain No CP, No SOB, No N/V Small Flatus    HEENT: PEARLA  Neck: Supple  Cardio: S1 S2 No Murmur  Pulm: CTA No Rales or Ronchi  Abd: Soft NT ND BS dec, Incisions clean  Rectal - refused  Ext: No DCT  Skin: No Rash  Neuro: Awake Pleasant Short Term memory loss      Pancreatic Cancer- POD#1, Pain control  Dementia Aricept , will monitor for agitation  Hypothyriod - levothyroxine  HTN - carvedilol , Benzapril, Norvasc, restart HCTZ once IV fluids removed and diet advances       BPH - tamsulosin will increase and monitor for urinary retention with bladder scans once alberto removed  Hyperglycemmia - Mild secondary to stress    Gout -Uloric 40 mg oral tablet: 1 tab(s) orally once a day     No Apneic events- DC     Spoke with Daughter HPI:  This is a 89 y.o male who presents to PST today accompanied by his daughter.  The pt is alert, but is confused at times, therefore, his daughter is in attendance and assisting the patient in answering questions.  As per the pt's daughter, the pt sustained a substantial weight loss of 25-30 lbs over a few months accompanied by abdominal pain.   A cat scan demonstrated a pancreatic mass. (10 Apr 2017 07:27)     Allergies    No Known Allergies    Intolerances      Risk factors for obstructive sleep apnea  Renal cyst  Pneumonia  Dementia  Hypothyroidism  Bladder cancer  Hypertrophy of prostate  Hypertension    MEDICATIONS  (STANDING):  levothyroxine 50MICROGram(s) Oral daily  lactated ringers 1000milliLiter(s) IV Continuous <Continuous>  gabapentin 300milliGRAM(s) Oral three times a day  carvedilol 6.25milliGRAM(s) Oral every 12 hours  donepezil 5milliGRAM(s) Oral at bedtime  tamsulosin 0.8milliGRAM(s) Oral at bedtime  lisinopril 10milliGRAM(s) Oral daily  amLODIPine   Tablet 5milliGRAM(s) Oral daily  heparin  Injectable 5000Unit(s) SubCutaneous every 8 hours    MEDICATIONS  (PRN):  oxyCODONE  5 mG/acetaminophen 325 mG 1Tablet(s) Oral every 4 hours PRN Moderate Pain (4 - 6)  oxyCODONE  5 mG/acetaminophen 325 mG 2Tablet(s) Oral every 4 hours PRN Severe Pain (7 - 10)  acetaminophen   Tablet. 650milliGRAM(s) Oral every 6 hours PRN Mild Pain (1 - 3)  HYDROmorphone  Injectable 0.5milliGRAM(s) IV Push every 6 hours PRN BREAKTHROUGH PAIN ONLY                           11.9   25.9  )-----------( 191      ( 26 Apr 2017 12:08 )             36.0     04-26    142  |  100  |  20.0  ----------------------------<  106  3.4<L>   |  27.0  |  1.21    Ca    8.9      26 Apr 2017 12:08        ;  Vital Signs Last 24 Hrs  T(C): 37, Max: 37.1 (04-26 @ 05:45)  T(F): 98.6, Max: 98.8 (04-26 @ 05:45)  HR: 82 (69 - 82)  BP: 151/55 (149/55 - 167/70)  BP(mean): --  RR: 18 (16 - 18)  SpO2: 94% (94% - 96%)      I & Os for 24h ending 04-26 @ 07:00  =============================================  IN: 2300 ml / OUT: 1595 ml / NET: 705 ml    I & Os for current day (as of 04-26 @ 19:05)  =============================================  IN: 0 ml / OUT: 550 ml / NET: -550 ml    Patient has mild Abd Pain No CP, No SOB, No N/V Small Flatus    HEENT: PEARLA  Neck: Supple  +Subcut Emphysemia  Cardio: S1 S2 No Murmur  Pulm: CTA No Rales or Ronchi  Abd: Soft NT ND BS dec, Incisions clean  Rectal - refused  Ext: No DCT  Skin: No Rash  Neuro: Awake Pleasant Short Term memory loss      Pancreatic Cancer- Post op, Pain control  Dementia Aricept , will monitor for agitation  Hypothyriod - levothyroxine  HTN - carvedilol , Benzapril, Norvasc, restart HCTZ once IV fluids removed and diet advances       BPH - tamsulosin will increase and monitor for urinary retention with bladder scans once alberto removed  Hyperglycemmia - Mild secondary to stress  Dysphagia - MBS in am    Gout -Uloric 40 mg oral tablet: 1 tab(s) orally once a day     No Apneic events- DC     Spoke with Daughter HPI:  This is a 89 y.o male who presents to PST today accompanied by his daughter.  The pt is alert, but is confused at times, therefore, his daughter is in attendance and assisting the patient in answering questions.  As per the pt's daughter, the pt sustained a substantial weight loss of 25-30 lbs over a few months accompanied by abdominal pain.   A cat scan demonstrated a pancreatic mass. (10 Apr 2017 07:27)     Allergies    No Known Allergies    Intolerances      Risk factors for obstructive sleep apnea  Renal cyst  Pneumonia  Dementia  Hypothyroidism  Bladder cancer  Hypertrophy of prostate  Hypertension    MEDICATIONS  (STANDING):  levothyroxine 50MICROGram(s) Oral daily  lactated ringers 1000milliLiter(s) IV Continuous <Continuous>  gabapentin 300milliGRAM(s) Oral three times a day  carvedilol 6.25milliGRAM(s) Oral every 12 hours  donepezil 5milliGRAM(s) Oral at bedtime  tamsulosin 0.8milliGRAM(s) Oral at bedtime  lisinopril 10milliGRAM(s) Oral daily  amLODIPine   Tablet 5milliGRAM(s) Oral daily  heparin  Injectable 5000Unit(s) SubCutaneous every 8 hours    MEDICATIONS  (PRN):  oxyCODONE  5 mG/acetaminophen 325 mG 1Tablet(s) Oral every 4 hours PRN Moderate Pain (4 - 6)  oxyCODONE  5 mG/acetaminophen 325 mG 2Tablet(s) Oral every 4 hours PRN Severe Pain (7 - 10)  acetaminophen   Tablet. 650milliGRAM(s) Oral every 6 hours PRN Mild Pain (1 - 3)  HYDROmorphone  Injectable 0.5milliGRAM(s) IV Push every 6 hours PRN BREAKTHROUGH PAIN ONLY                           11.9   25.9  )-----------( 191      ( 26 Apr 2017 12:08 )             36.0     04-26    142  |  100  |  20.0  ----------------------------<  106  3.4<L>   |  27.0  |  1.21    Ca    8.9      26 Apr 2017 12:08        ;  Vital Signs Last 24 Hrs  T(C): 37, Max: 37.1 (04-26 @ 05:45)  T(F): 98.6, Max: 98.8 (04-26 @ 05:45)  HR: 82 (69 - 82)  BP: 151/55 (149/55 - 167/70)  BP(mean): --  RR: 18 (16 - 18)  SpO2: 94% (94% - 96%)      I & Os for 24h ending 04-26 @ 07:00  =============================================  IN: 2300 ml / OUT: 1595 ml / NET: 705 ml    I & Os for current day (as of 04-26 @ 19:05)  =============================================  IN: 0 ml / OUT: 550 ml / NET: -550 ml    Patient has mild Abd Pain No CP, No SOB, No N/V Small Flatus    HEENT: PEARLA  Neck: Supple  +Subcut Emphysemia  Cardio: S1 S2 No Murmur  Pulm: CTA No Rales or Ronchi  Abd: Soft NT ND BS dec, Incisions clean  Rectal - refused  Ext: No DCT  Skin: No Rash  Neuro: Awake Pleasant Short Term memory loss      Pancreatic Cancer- Post op, Pain control  Dementia Aricept , will monitor for agitation  Hypothyriod - levothyroxine  HTN - carvedilol , Benzapril, Norvasc, restart HCTZ once IV fluids removed and diet advances       BPH - tamsulosin will increase and monitor for urinary retention with bladder scans once alberto removed  Hyperglycemmia - Mild secondary to stress  Dysphagia - MBS in am  Leukocytosis - Non toxic probably acute phase rxn    Gout -Uloric 40 mg oral tablet: 1 tab(s) orally once a day     No Apneic events- DC     Spoke with Daughter HPI:  This is a 89 y.o male who presents to PST today accompanied by his daughter.  The pt is alert, but is confused at times, therefore, his daughter is in attendance and assisting the patient in answering questions.  As per the pt's daughter, the pt sustained a substantial weight loss of 25-30 lbs over a few months accompanied by abdominal pain.   A cat scan demonstrated a pancreatic mass. (10 Apr 2017 07:27)     Allergies    No Known Allergies    Intolerances      Risk factors for obstructive sleep apnea  Renal cyst  Pneumonia  Dementia  Hypothyroidism  Bladder cancer  Hypertrophy of prostate  Hypertension    MEDICATIONS  (STANDING):  levothyroxine 50MICROGram(s) Oral daily  lactated ringers 1000milliLiter(s) IV Continuous <Continuous>  gabapentin 300milliGRAM(s) Oral three times a day  carvedilol 6.25milliGRAM(s) Oral every 12 hours  donepezil 5milliGRAM(s) Oral at bedtime  tamsulosin 0.8milliGRAM(s) Oral at bedtime  lisinopril 10milliGRAM(s) Oral daily  amLODIPine   Tablet 5milliGRAM(s) Oral daily  heparin  Injectable 5000Unit(s) SubCutaneous every 8 hours    MEDICATIONS  (PRN):  oxyCODONE  5 mG/acetaminophen 325 mG 1Tablet(s) Oral every 4 hours PRN Moderate Pain (4 - 6)  oxyCODONE  5 mG/acetaminophen 325 mG 2Tablet(s) Oral every 4 hours PRN Severe Pain (7 - 10)  acetaminophen   Tablet. 650milliGRAM(s) Oral every 6 hours PRN Mild Pain (1 - 3)  HYDROmorphone  Injectable 0.5milliGRAM(s) IV Push every 6 hours PRN BREAKTHROUGH PAIN ONLY                           11.9   25.9  )-----------( 191      ( 26 Apr 2017 12:08 )             36.0     04-26    142  |  100  |  20.0  ----------------------------<  106  3.4<L>   |  27.0  |  1.21    Ca    8.9      26 Apr 2017 12:08        ;  Vital Signs Last 24 Hrs  T(C): 37, Max: 37.1 (04-26 @ 05:45)  T(F): 98.6, Max: 98.8 (04-26 @ 05:45)  HR: 82 (69 - 82)  BP: 151/55 (149/55 - 167/70)  BP(mean): --  RR: 18 (16 - 18)  SpO2: 94% (94% - 96%)      I & Os for 24h ending 04-26 @ 07:00  =============================================  IN: 2300 ml / OUT: 1595 ml / NET: 705 ml    I & Os for current day (as of 04-26 @ 19:05)  =============================================  IN: 0 ml / OUT: 550 ml / NET: -550 ml    Patient has mild Abd Pain No CP, No SOB, No N/V Small Flatus    HEENT: PEARLA  Neck: Supple  +Subcut Emphysemia  Cardio: S1 S2 No Murmur  Pulm: CTA No Rales or Ronchi  Abd: Soft NT ND BS dec, Incisions clean  Rectal - refused  Ext: No DCT  Skin: No Rash  Neuro: Awake Pleasant Short Term memory loss      Pancreatic Cancer- Post op, Pain control  Dementia Aricept , will monitor for agitation  Hypothyriod - levothyroxine  HTN - carvedilol , Benzapril, Norvasc, restart HCTZ once IV fluids removed and diet advances       BPH - tamsulosin will increase and monitor for urinary retention with bladder scans once alberto removed  Hyperglycemmia - Mild secondary to stress  Dysphagia - MBS in am  Leukocytosis - Non toxic probably acute phase rxn  Sub Cutaneous Emphysema - spoke with surgery normal occurrence after this surgery    Gout -Uloric 40 mg oral tablet: 1 tab(s) orally once a day     No Apneic events- DC     Spoke with Daughter

## 2017-04-27 ENCOUNTER — TRANSCRIPTION ENCOUNTER (OUTPATIENT)
Age: 82
End: 2017-04-27

## 2017-04-27 LAB
ANION GAP SERPL CALC-SCNC: 18 MMOL/L — HIGH (ref 5–17)
BASOPHILS # BLD AUTO: 0 K/UL — SIGNIFICANT CHANGE UP (ref 0–0.2)
BASOPHILS NFR BLD AUTO: 0.1 % — SIGNIFICANT CHANGE UP (ref 0–2)
BUN SERPL-MCNC: 19 MG/DL — SIGNIFICANT CHANGE UP (ref 8–20)
CALCIUM SERPL-MCNC: 8.9 MG/DL — SIGNIFICANT CHANGE UP (ref 8.6–10.2)
CHLORIDE SERPL-SCNC: 101 MMOL/L — SIGNIFICANT CHANGE UP (ref 98–107)
CO2 SERPL-SCNC: 23 MMOL/L — SIGNIFICANT CHANGE UP (ref 22–29)
CREAT SERPL-MCNC: 1.05 MG/DL — SIGNIFICANT CHANGE UP (ref 0.5–1.3)
EOSINOPHIL # BLD AUTO: 0.1 K/UL — SIGNIFICANT CHANGE UP (ref 0–0.5)
EOSINOPHIL NFR BLD AUTO: 0.2 % — SIGNIFICANT CHANGE UP (ref 0–5)
GLUCOSE SERPL-MCNC: 130 MG/DL — HIGH (ref 70–115)
HCT VFR BLD CALC: 37.3 % — LOW (ref 42–52)
HGB BLD-MCNC: 12.4 G/DL — LOW (ref 14–18)
LYMPHOCYTES # BLD AUTO: 1.5 K/UL — SIGNIFICANT CHANGE UP (ref 1–4.8)
LYMPHOCYTES # BLD AUTO: 5.8 % — LOW (ref 20–55)
MCHC RBC-ENTMCNC: 27.6 PG — SIGNIFICANT CHANGE UP (ref 27–31)
MCHC RBC-ENTMCNC: 33.2 G/DL — SIGNIFICANT CHANGE UP (ref 32–36)
MCV RBC AUTO: 83.1 FL — SIGNIFICANT CHANGE UP (ref 80–94)
MONOCYTES # BLD AUTO: 1.8 K/UL — HIGH (ref 0–0.8)
MONOCYTES NFR BLD AUTO: 6.7 % — SIGNIFICANT CHANGE UP (ref 3–10)
NEUTROPHILS # BLD AUTO: 22.6 K/UL — HIGH (ref 1.8–8)
NEUTROPHILS NFR BLD AUTO: 86.5 % — HIGH (ref 37–73)
PLATELET # BLD AUTO: 255 K/UL — SIGNIFICANT CHANGE UP (ref 150–400)
POTASSIUM SERPL-MCNC: 3.6 MMOL/L — SIGNIFICANT CHANGE UP (ref 3.5–5.3)
POTASSIUM SERPL-SCNC: 3.6 MMOL/L — SIGNIFICANT CHANGE UP (ref 3.5–5.3)
RBC # BLD: 4.49 M/UL — LOW (ref 4.6–6.2)
RBC # FLD: 14.2 % — SIGNIFICANT CHANGE UP (ref 11–15.6)
SODIUM SERPL-SCNC: 142 MMOL/L — SIGNIFICANT CHANGE UP (ref 135–145)
WBC # BLD: 26.1 K/UL — HIGH (ref 4.8–10.8)
WBC # FLD AUTO: 26.1 K/UL — HIGH (ref 4.8–10.8)

## 2017-04-27 PROCEDURE — 74230 X-RAY XM SWLNG FUNCJ C+: CPT | Mod: 26

## 2017-04-27 RX ORDER — ACETAMINOPHEN 500 MG
1000 TABLET ORAL ONCE
Qty: 0 | Refills: 0 | Status: DISCONTINUED | OUTPATIENT
Start: 2017-04-27 | End: 2017-04-28

## 2017-04-27 RX ORDER — HYDROMORPHONE HYDROCHLORIDE 2 MG/ML
0.5 INJECTION INTRAMUSCULAR; INTRAVENOUS; SUBCUTANEOUS
Qty: 0 | Refills: 0 | Status: DISCONTINUED | OUTPATIENT
Start: 2017-04-27 | End: 2017-04-28

## 2017-04-27 RX ADMIN — CARVEDILOL PHOSPHATE 6.25 MILLIGRAM(S): 80 CAPSULE, EXTENDED RELEASE ORAL at 17:22

## 2017-04-27 RX ADMIN — GABAPENTIN 300 MILLIGRAM(S): 400 CAPSULE ORAL at 21:44

## 2017-04-27 RX ADMIN — LISINOPRIL 10 MILLIGRAM(S): 2.5 TABLET ORAL at 15:12

## 2017-04-27 RX ADMIN — TAMSULOSIN HYDROCHLORIDE 0.8 MILLIGRAM(S): 0.4 CAPSULE ORAL at 21:44

## 2017-04-27 RX ADMIN — Medication 50 MICROGRAM(S): at 15:13

## 2017-04-27 RX ADMIN — HYDROMORPHONE HYDROCHLORIDE 0.5 MILLIGRAM(S): 2 INJECTION INTRAMUSCULAR; INTRAVENOUS; SUBCUTANEOUS at 03:22

## 2017-04-27 RX ADMIN — HYDROMORPHONE HYDROCHLORIDE 0.5 MILLIGRAM(S): 2 INJECTION INTRAMUSCULAR; INTRAVENOUS; SUBCUTANEOUS at 09:03

## 2017-04-27 RX ADMIN — DONEPEZIL HYDROCHLORIDE 5 MILLIGRAM(S): 10 TABLET, FILM COATED ORAL at 21:44

## 2017-04-27 RX ADMIN — HYDROMORPHONE HYDROCHLORIDE 0.5 MILLIGRAM(S): 2 INJECTION INTRAMUSCULAR; INTRAVENOUS; SUBCUTANEOUS at 09:18

## 2017-04-27 RX ADMIN — HEPARIN SODIUM 5000 UNIT(S): 5000 INJECTION INTRAVENOUS; SUBCUTANEOUS at 05:52

## 2017-04-27 RX ADMIN — GABAPENTIN 300 MILLIGRAM(S): 400 CAPSULE ORAL at 14:52

## 2017-04-27 RX ADMIN — HYDROMORPHONE HYDROCHLORIDE 0.5 MILLIGRAM(S): 2 INJECTION INTRAMUSCULAR; INTRAVENOUS; SUBCUTANEOUS at 03:08

## 2017-04-27 RX ADMIN — HEPARIN SODIUM 5000 UNIT(S): 5000 INJECTION INTRAVENOUS; SUBCUTANEOUS at 21:44

## 2017-04-27 RX ADMIN — HEPARIN SODIUM 5000 UNIT(S): 5000 INJECTION INTRAVENOUS; SUBCUTANEOUS at 14:52

## 2017-04-27 NOTE — PROGRESS NOTE ADULT - SUBJECTIVE AND OBJECTIVE BOX
INTERVAL HPI/OVERNIGHT EVENTS: 89 year old male s/p splenectomy and distal pancreatectomy on 4/24/17 POD3. No events overnight. Patient having trouble tolerating cardiac and pain medications. Patient denies any nausea, vomiting diarrhea, SOB, chest pain, dizziness. + flatus.    STATUS POST:  splenectomy and distal pancreatectomy     POST OPERATIVE DAY #: 3    SUBJECTIVE:  Flatus: [x ] YES [ ] NO             Bowel Movement: [ ] YES [x ] NO  Pain (0-10):            Pain Control Adequate: [x ] YES [ ] NO  Nausea: [ ] YES [ x] NO            Vomiting: [ ] YES [x ] NO  Diarrhea: [ ] YES [ x] NO         Constipation: [ ] YES [ ] NO     Chest Pain: [ ] YES [ x] NO    SOB:  [ ] YES [x ] NO    MEDICATIONS  (STANDING):  levothyroxine 50MICROGram(s) Oral daily  lactated ringers 1000milliLiter(s) IV Continuous <Continuous>  gabapentin 300milliGRAM(s) Oral three times a day  carvedilol 6.25milliGRAM(s) Oral every 12 hours  donepezil 5milliGRAM(s) Oral at bedtime  tamsulosin 0.8milliGRAM(s) Oral at bedtime  lisinopril 10milliGRAM(s) Oral daily  amLODIPine   Tablet 5milliGRAM(s) Oral daily  heparin  Injectable 5000Unit(s) SubCutaneous every 8 hours  hydrochlorothiazide   Tablet 25milliGRAM(s) Oral daily    MEDICATIONS  (PRN):  HYDROmorphone  Injectable 0.5milliGRAM(s) IV Push every 6 hours PRN BREAKTHROUGH PAIN ONLY  HYDROmorphone  Injectable 0.5milliGRAM(s) IV Push every 3 hours PRN Moderate Pain (4 - 6)  acetaminophen  IVPB. 1000milliGRAM(s) IV Intermittent once PRN Mild Pain (1 - 3)      Vital Signs Last 24 Hrs  T(C): 36.7, Max: 37.3 (04-26 @ 20:32)  T(F): 98.1, Max: 99.2 (04-26 @ 20:32)  HR: 72 (66 - 82)  BP: 157/65 (151/55 - 157/65)  BP(mean): --  RR: 17 (17 - 18)  SpO2: 96% (94% - 96%)    PHYSICAL EXAM:      Constitutional: NAD    Eyes: EOMI    Respiratory: CTA b/l no rhonchi, wheezing or rales    Cardiovascular: RRR    Gastrointestinal: abdomen is soft, appropriately tender, non-distended    Genitourinary: voiding    Extremities: FROM in all 4 extremites    Neurological: alert        I&O's Detail    I & Os for current day (as of 27 Apr 2017 10:07)  =============================================  IN:    lactated ringers: 2600 ml    Total IN: 2600 ml  ---------------------------------------------  OUT:    Voided: 825 ml    Indwelling Catheter - Urethral: 550 ml    Total OUT: 1375 ml  ---------------------------------------------  Total NET: 1225 ml      LABS:                        12.4   26.1  )-----------( 255      ( 27 Apr 2017 07:09 )             37.3     04-27    142  |  101  |  19.0  ----------------------------<  130<H>  3.6   |  23.0  |  1.05    Ca    8.9      27 Apr 2017 07:09            RADIOLOGY & ADDITIONAL STUDIES:

## 2017-04-27 NOTE — SWALLOW VFSS/MBS ASSESSMENT ADULT - DIAGNOSTIC IMPRESSIONS
Mild oral dysphagia marked by reduced lingual coordination; Severe pharyngeal dysphagia marked by reduced tongue base retraction, reduced hyolaryngeal excursion, reduced upper airway closure +silent penetration above the vocal folds with retrieval during the swallow and +silent penetration contacting the vocal folds without clearance after the swallow with puree.  +Silent penetration contacting the vocal folds without clearance after the swallow with honey thick liquids.  Osteophyte noted at c3-c4 nimping upon hypopharyx further impacting stasisS Mild oral dysphagia marked by reduced lingual coordination; Severe pharyngeal dysphagia marked by reduced tongue base retraction, reduced hyolaryngeal excursion, reduced upper airway closure +silent penetration above the vocal folds with retrieval during the swallow and +silent penetration contacting the vocal folds without clearance after the swallow with puree.  +Silent penetration contacting the vocal folds without clearance after the swallow with honey thick liquids.  Osteophyte noted at c3-c4 impinging upon hypopharyx, further impacting stasis.  +Trace retrograde progression of PO to pyriform sinuses observed.  No aspiration observed, however, pt at risk for aspiration with all PO 2* above.

## 2017-04-27 NOTE — SWALLOW VFSS/MBS ASSESSMENT ADULT - ROSENBEK'S PENETRATION ASPIRATION SCALE
during the swallow; 5 = contrast contacts vocal cords, visible residue remains after the swallow/(2) contrast enters airway, remains above the vocal cords, no residue remains (penetration) (5) contrast contacts vocal cords, visible residue remains (penetration)

## 2017-04-27 NOTE — PROGRESS NOTE ADULT - SUBJECTIVE AND OBJECTIVE BOX
HPI:  This is a 89 y.o male who presents to PST today accompanied by his daughter.  The pt is alert, but is confused at times, therefore, his daughter is in attendance and assisting the patient in answering questions.  As per the pt's daughter, the pt sustained a substantial weight loss of 25-30 lbs over a few months accompanied by abdominal pain.   A cat scan demonstrated a pancreatic mass. (10 Apr 2017 07:27)     Allergies    No Known Allergies    Intolerances      Risk factors for obstructive sleep apnea  Renal cyst  Pneumonia  Dementia  Hypothyroidism  Bladder cancer  Hypertrophy of prostate  Hypertension    MEDICATIONS  (STANDING):  levothyroxine 50MICROGram(s) Oral daily  gabapentin 300milliGRAM(s) Oral three times a day  carvedilol 6.25milliGRAM(s) Oral every 12 hours  donepezil 5milliGRAM(s) Oral at bedtime  tamsulosin 0.8milliGRAM(s) Oral at bedtime  lisinopril 10milliGRAM(s) Oral daily  amLODIPine   Tablet 5milliGRAM(s) Oral daily  heparin  Injectable 5000Unit(s) SubCutaneous every 8 hours  hydrochlorothiazide   Tablet 25milliGRAM(s) Oral daily    MEDICATIONS  (PRN):  HYDROmorphone  Injectable 0.5milliGRAM(s) IV Push every 6 hours PRN BREAKTHROUGH PAIN ONLY  HYDROmorphone  Injectable 0.5milliGRAM(s) IV Push every 3 hours PRN Moderate Pain (4 - 6)  acetaminophen  IVPB. 1000milliGRAM(s) IV Intermittent once PRN Mild Pain (1 - 3)                           12.4   26.1  )-----------( 255      ( 27 Apr 2017 07:09 )             37.3     04-27    142  |  101  |  19.0  ----------------------------<  130<H>  3.6   |  23.0  |  1.05    Ca    8.9      27 Apr 2017 07:09        ;  Vital Signs Last 24 Hrs  T(C): 37.2, Max: 37.3 (04-26 @ 20:32)  T(F): 98.9, Max: 99.2 (04-26 @ 20:32)  HR: 68 (66 - 75)  BP: 175/75 (153/64 - 182/71)  BP(mean): --  RR: 18 (17 - 18)  SpO2: 99% (95% - 99%)      I & Os for 24h ending 04-27 @ 07:00  =============================================  IN: 2600 ml / OUT: 1375 ml / NET: 1225 ml    I & Os for current day (as of 04-27 @ 18:05)  =============================================  IN: 0 ml / OUT: 450 ml / NET: -450 ml    Patient has mild Abd Pain No CP, No SOB, No N/V Small Flatus +urination    HEENT: PEARLA  Neck: Supple  +Subcut Emphysemia  Cardio: S1 S2 No Murmur  Pulm: CTA No Rales or Ronchi  Abd: Soft NT ND BS dec, Incisions clean  Rectal - refused  Ext: No DCT  Skin: No Rash  Neuro: Awake Pleasant Short Term memory loss      Pancreatic Cancer- Post op, Pain control  Dementia Aricept , will monitor for agitation  Hypothyriod - levothyroxine  HTN - carvedilol , Benzapril, Norvasc, restart HCTZ once IV fluids removed and diet advances       BPH - tamsulosin will increase and monitor for urinary retention with bladder scans once alberto removed  Hyperglycemmia - Mild secondary to stress  Dysphagia - MBS swallow dysfunction surgery managing educated family about feeding and aspiration  Leukocytosis - Non toxic acute phase rxn and spleenectomy  Sub Cutaneous Emphysema - spoke with surgery normal occurrence after this surgery    Gout -Uloric 40 mg oral tablet: 1 tab(s) orally once a day         Spoke with Daughter

## 2017-04-27 NOTE — CHART NOTE - NSCHARTNOTEFT_GEN_A_CORE
Patient s/p RA lap distal pancreatectomy and splenectomy.  From POD#1 patient began to have difficulties swallowing inclusive of water and medications.    For this reason, a bedside speech and swallow was ordered and based on evaluation, NPO status was recommended to continue.  This was repeated again the following day with the same result and thus a modified barium swallow was performed.  These results showed osteophytes of C3-4 that were compressing on the hypopharynx causing the barium to pool into the area of the vocal chords.   Based on these results, speech therapy recommended to continue NPO status.  This was discussed with Dr. Miranda however, and based on the patient's age and absence of actual aspiration his decision was to advance the patient's diet to allow him to return to his prior eating habits.

## 2017-04-27 NOTE — SWALLOW VFSS/MBS ASSESSMENT ADULT - SLP PERTINENT HISTORY OF CURRENT PROBLEM
Pt seen at bedside for swallow evaluations, last assessment 4/26/17: Rx NPO & MBS 2* pt report of globus sensation and +overt s/s penetration/aspiration.

## 2017-04-27 NOTE — PROGRESS NOTE ADULT - ATTENDING COMMENTS
Patient seen and examined.  Agree with above note.  S/S evaluation  D/C planning in next 24 to 48 hrs.
Pt seen and examined.  Agree with above.

## 2017-04-27 NOTE — SWALLOW VFSS/MBS ASSESSMENT ADULT - NS SWALLOW VFSS REC ASPIR MON
fever/pneumonia/oral hygiene/cough/position upright (89Y)/change of breathing pattern/throat clearing/gurgly voice/upper respiratory infection

## 2017-04-27 NOTE — SWALLOW VFSS/MBS ASSESSMENT ADULT - PHARYNGEAL PHASE COMMENTS
Pt required 5-7 multiple swallows to reduce residue, however, did not clear completely Residue reduced with subsequent swallows, +gurgly vocal quality noted after trials

## 2017-04-28 VITALS
TEMPERATURE: 99 F | SYSTOLIC BLOOD PRESSURE: 145 MMHG | RESPIRATION RATE: 17 BRPM | HEART RATE: 72 BPM | OXYGEN SATURATION: 98 % | DIASTOLIC BLOOD PRESSURE: 71 MMHG

## 2017-04-28 LAB
HCT VFR BLD CALC: 34.3 % — LOW (ref 42–52)
HGB BLD-MCNC: 11.3 G/DL — LOW (ref 14–18)
MCHC RBC-ENTMCNC: 27.3 PG — SIGNIFICANT CHANGE UP (ref 27–31)
MCHC RBC-ENTMCNC: 32.9 G/DL — SIGNIFICANT CHANGE UP (ref 32–36)
MCV RBC AUTO: 82.9 FL — SIGNIFICANT CHANGE UP (ref 80–94)
PLATELET # BLD AUTO: 310 K/UL — SIGNIFICANT CHANGE UP (ref 150–400)
RBC # BLD: 4.14 M/UL — LOW (ref 4.6–6.2)
RBC # FLD: 13.9 % — SIGNIFICANT CHANGE UP (ref 11–15.6)
WBC # BLD: 22.9 K/UL — HIGH (ref 4.8–10.8)
WBC # FLD AUTO: 22.9 K/UL — HIGH (ref 4.8–10.8)

## 2017-04-28 PROCEDURE — 86901 BLOOD TYPING SEROLOGIC RH(D): CPT

## 2017-04-28 PROCEDURE — 36415 COLL VENOUS BLD VENIPUNCTURE: CPT

## 2017-04-28 PROCEDURE — 85027 COMPLETE CBC AUTOMATED: CPT

## 2017-04-28 PROCEDURE — 86850 RBC ANTIBODY SCREEN: CPT

## 2017-04-28 PROCEDURE — 88309 TISSUE EXAM BY PATHOLOGIST: CPT

## 2017-04-28 PROCEDURE — 86900 BLOOD TYPING SEROLOGIC ABO: CPT

## 2017-04-28 PROCEDURE — 92526 ORAL FUNCTION THERAPY: CPT

## 2017-04-28 PROCEDURE — 92610 EVALUATE SWALLOWING FUNCTION: CPT

## 2017-04-28 PROCEDURE — 74230 X-RAY XM SWLNG FUNCJ C+: CPT

## 2017-04-28 PROCEDURE — 71045 X-RAY EXAM CHEST 1 VIEW: CPT

## 2017-04-28 PROCEDURE — 80048 BASIC METABOLIC PNL TOTAL CA: CPT

## 2017-04-28 PROCEDURE — 92611 MOTION FLUOROSCOPY/SWALLOW: CPT

## 2017-04-28 RX ADMIN — HEPARIN SODIUM 5000 UNIT(S): 5000 INJECTION INTRAVENOUS; SUBCUTANEOUS at 06:30

## 2017-04-28 RX ADMIN — AMLODIPINE BESYLATE 5 MILLIGRAM(S): 2.5 TABLET ORAL at 06:31

## 2017-04-28 RX ADMIN — GABAPENTIN 300 MILLIGRAM(S): 400 CAPSULE ORAL at 06:30

## 2017-04-28 RX ADMIN — GABAPENTIN 300 MILLIGRAM(S): 400 CAPSULE ORAL at 15:00

## 2017-04-28 RX ADMIN — HEPARIN SODIUM 5000 UNIT(S): 5000 INJECTION INTRAVENOUS; SUBCUTANEOUS at 15:00

## 2017-04-28 RX ADMIN — LISINOPRIL 10 MILLIGRAM(S): 2.5 TABLET ORAL at 06:30

## 2017-04-28 RX ADMIN — Medication 50 MICROGRAM(S): at 06:31

## 2017-04-28 RX ADMIN — CARVEDILOL PHOSPHATE 6.25 MILLIGRAM(S): 80 CAPSULE, EXTENDED RELEASE ORAL at 06:31

## 2017-04-28 RX ADMIN — CARVEDILOL PHOSPHATE 6.25 MILLIGRAM(S): 80 CAPSULE, EXTENDED RELEASE ORAL at 18:10

## 2017-04-28 NOTE — PROGRESS NOTE ADULT - SUBJECTIVE AND OBJECTIVE BOX
HPI:  This is a 89 y.o male who presents to PST today accompanied by his daughter.  The pt is alert, but is confused at times, therefore, his daughter is in attendance and assisting the patient in answering questions.  As per the pt's daughter, the pt sustained a substantial weight loss of 25-30 lbs over a few months accompanied by abdominal pain.   A cat scan demonstrated a pancreatic mass. (10 Apr 2017 07:27)     Allergies    No Known Allergies    Intolerances      Risk factors for obstructive sleep apnea  Renal cyst  Pneumonia  Dementia  Hypothyroidism  Bladder cancer  Hypertrophy of prostate  Hypertension    MEDICATIONS  (STANDING):  levothyroxine 50MICROGram(s) Oral daily  gabapentin 300milliGRAM(s) Oral three times a day  carvedilol 6.25milliGRAM(s) Oral every 12 hours  donepezil 5milliGRAM(s) Oral at bedtime  tamsulosin 0.8milliGRAM(s) Oral at bedtime  lisinopril 10milliGRAM(s) Oral daily  amLODIPine   Tablet 5milliGRAM(s) Oral daily  heparin  Injectable 5000Unit(s) SubCutaneous every 8 hours  hydrochlorothiazide   Tablet 25milliGRAM(s) Oral daily    MEDICATIONS  (PRN):  HYDROmorphone  Injectable 0.5milliGRAM(s) IV Push every 6 hours PRN BREAKTHROUGH PAIN ONLY  HYDROmorphone  Injectable 0.5milliGRAM(s) IV Push every 3 hours PRN Moderate Pain (4 - 6)  acetaminophen  IVPB. 1000milliGRAM(s) IV Intermittent once PRN Mild Pain (1 - 3)                           11.3   22.9  )-----------( 310      ( 28 Apr 2017 05:45 )             34.3     04-27    142  |  101  |  19.0  ----------------------------<  130<H>  3.6   |  23.0  |  1.05    Ca    8.9      27 Apr 2017 07:09        ;  Vital Signs Last 24 Hrs  T(C): 37.1, Max: 37.1 (04-28 @ 16:22)  T(F): 98.8, Max: 98.8 (04-28 @ 16:22)  HR: 72 (59 - 74)  BP: 145/71 (128/57 - 163/71)  BP(mean): --  RR: 17 (17 - 18)  SpO2: 98% (93% - 98%)  CAPILLARY BLOOD GLUCOSE    I & Os for 24h ending 04-28 @ 07:00  =============================================  IN: 850 ml / OUT: 1250 ml / NET: -400 ml    I & Os for current day (as of 04-28 @ 18:25)  =============================================  IN: 200 ml / OUT: 0 ml / NET: 200 ml    Patient has min Abd Pain No CP, No SOB, No N/V     HEENT: PEARLA  Neck: Supple  +Subcut Emphysemia  Cardio: S1 S2 No Murmur  Pulm: CTA No Rales or Ronchi  Abd: Soft NT ND BS dec, Incisions clean  Rectal - refused  Ext: No DCT  Skin: No Rash  Neuro: Awake Pleasant Short Term memory loss      Pancreatic Cancer- Post op, Pain control  Dementia Aricept , will monitor for agitation  Hypothyriod - levothyroxine  HTN - carvedilol , Benzapril, Norvasc, restart HCTZ once IV fluids removed and diet advances       BPH - tamsulosin will increase and monitor for urinary retention with bladder scans once alberto removed  Hyperglycemmia - Mild secondary to stress  Dysphagia - educated family about feeding and aspiration  Leukocytosis - Non toxic acute phase rxn and spleenectomy  Sub Cutaneous Emphysema - spoke with surgery normal occurrence after this surgery    Gout -Uloric 40 mg oral tablet: 1 tab(s) orally once a day         Spoke with Daughter stable for dc

## 2017-04-28 NOTE — PROGRESS NOTE ADULT - ASSESSMENT
89 yo male s/p splenectomy and distal pancreatectomy   / continue pain management   / continue NPO for now, advance diet in am   / needs continued observation due to dementia   / OOB ambulating   / care as per Dr. Miranda
89 year old male s/p splenectomy and distal pancreatectomy POD 3  - speech eval vs barium swallow today  - decision about PO medication and diet will be made after swallow eval  - continue pain control  - encourage OOB and ambulate  - VTE prophylaxis
89M s/p above, no acute concerns.  1. Repeat speech and swallow bedside today.  2. DC alberto  3. Reinstate VTE ppx  4. OOB/ambulate   5. Advance diet if passes SS  6. Optimize pain control
stable, Speech and swallow evals noted, attending MD wants patient encouraged to try advancing to reg diet
stable, s/p lap /robotic assisted distal pancreatectomy and splenectomy    Side note: pt's wife passed away at home while patient was in surgery yesterday

## 2017-04-28 NOTE — DISCHARGE NOTE ADULT - MEDICATION SUMMARY - MEDICATIONS TO TAKE
I will START or STAY ON the medications listed below when I get home from the hospital:    benazepril 20 mg oral tablet  -- 1 tab(s) by mouth once a day  -- Takes in AM  -- Indication: For Home mes    tamsulosin  -- 1  by mouth once a day (at bedtime)  -- Indication: For Home med    gabapentin 300 mg oral capsule  -- 1 cap(s) by mouth 3 times a day  -- Indication: For Home med    hydrochlorothiazide-triamterene 25 mg-37.5 mg oral tablet  -- 1 tab(s) by mouth once a day  -- Takes in AM  -- Indication: For Home med    Uloric 40 mg oral tablet  -- 1 tab(s) by mouth once a day  -- takes in AM  -- Indication: For Home med    carvedilol 6.25 mg oral tablet  -- 1 tab(s) by mouth once a day  -- Takes in AM  -- Indication: For Home med    amLODIPine 5 mg oral tablet  -- 1 tab(s) by mouth once a day  -- Takes in AM  -- Indication: For Home med    Aricept 5 mg oral tablet  -- 1 tab(s) by mouth once a day (at bedtime)  -- Indication: For Home mes    potassium chloride 20 mEq oral tablet, extended release  --  by mouth once a day  -- Indication: For Home med    levothyroxine 50 mcg (0.05 mg) oral tablet  -- 1 tab(s) by mouth once a day  -- Takes in AM  -- Indication: For Home med    Vitamin D3 1000 intl units oral tablet  -- 1 tab(s) by mouth once a day  -- Takes in AM  -- Indication: For Home med

## 2017-04-28 NOTE — PROGRESS NOTE ADULT - GASTROINTESTINAL DETAILS
no rebound tenderness/nontender/bowel sounds normal/no distention/no guarding/soft
no distention/soft

## 2017-04-28 NOTE — DISCHARGE NOTE ADULT - CARE PROVIDERS DIRECT ADDRESSES
[email or web address]

## 2017-04-28 NOTE — DISCHARGE NOTE ADULT - CARE PROVIDER_API CALL
Jose Miranda), ColonRectal Surgery; Surgery  [address] [place] [address]  Phone: [phone]  Fax: [phone]

## 2017-04-28 NOTE — DISCHARGE NOTE ADULT - CARE PLAN
Principal Discharge DX:	Pancreatic mass  Goal:	s/p resection  Instructions for follow-up, activity and diet:	make appt to f/u

## 2017-04-28 NOTE — DISCHARGE NOTE ADULT - PATIENT PORTAL LINK FT
“You can access the Follow[facility] Patient Portal, offered by [facility], by registering with the following website: [email or web address]”

## 2017-04-28 NOTE — PROGRESS NOTE ADULT - SUBJECTIVE AND OBJECTIVE BOX
SURGICAL PA NOTE:     STATUS POST:      Diagnosis:   Pre-Op Diagnosis:  Pancreatic mass  04/24/2017    Active  Angeles Storm.    Post-Op Dx:  Pancreatic mass  04/24/2017    Active  Angeles Storm.    Procedure:   Procedure:  Splenectomy  04/24/2017    Active  BEAR  Distal pancreatectomy  04/24/2017    Active  BEAR  Robotic assisted procedure  04/24/2017    Active  BEAR.      Operative Findings:  · Operative Findings	mass on tail of pancreas	    Specimens/Blood Loss/IV/Output/Protocol/VTE:   Specimens/Blood Loss/IV/Output/Protocol/VTE:  · Specimens	1) Product of distal pancreatectomy-splenectomy; lymphadenectomy; retroperitoneum	      POST OPERATIVE DAY #: 4    Vital Signs Last 24 Hrs  T(C): 36.8, Max: 37.2 (04-27 @ 15:09)  T(F): 98.2, Max: 98.9 (04-27 @ 15:09)  HR: 74 (64 - 74)  BP: 154/57 (154/57 - 175/75)  BP(mean): --  RR: 18 (17 - 18)  SpO2: 96% (93% - 96%)    HPI:  This is a 89 y.o male who presents to PST today accompanied by his daughter.  The pt is alert, but is confused at times, therefore, his daughter is in attendance and assisting the patient in answering questions.  As per the pt's daughter, the pt sustained a substantial weight loss of 25-30 lbs over a few months accompanied by abdominal pain.   A cat scan demonstrated a pancreatic mass. (10 Apr 2017 07:27)      Disorder of pancreas  No h/o HF  Family history of breast cancer (Sibling)  No significant family history (Father, Mother)  Handoff  MEWS Score  Risk factors for obstructive sleep apnea  Renal cyst  Pneumonia  Dementia  Hypothyroidism  Bladder cancer  Hypertrophy of prostate  Hypertension  Pancreatic mass  Pancreatic mass  Distal pancreatectomy  Pancreatic mass  Bladder cancer  Hypertension  Pancreatic mass  Robotic assisted procedure  Distal pancreatectomy  Splenectomy  H/O elbow surgery  H/O colonoscopy  H/O cystoscopy  H/O hernia repair  DISEASE OF PANCREAS, UNSPECIFI  Splenectomy  Robotic assisted procedure      SUBJECTIVE: Pt seen lying supine with HOB up, only PO intake yest was jello and pudding, no c/o nausea / vomiting / or feeling of choking     Diet: soft with thin liquids ordered    Activity: OOB amb yest with walker    Fevers: [ ]Yes [x ]NO  Chills: [ ] Yes [ x] NO  SOB:  [ ] YES [x ] NO  Dyspnea: [ ]YES [x ]NO  Chest Discomfort: [ ] YES [ x] NO    Nausea: [ ] YES [ x] NO           Vomiting: [ ] YES [ x] NO  Flatus: [ x] YES [ ] NO             Bowel Movement: [ ] YES [x ] NO  Diarrhea: [ ] YES [ x] NO         Void: [x ]YES [ ]No  Constipation: [ ] YES [ ] NO       Pain (0-10):     2         Pain Control Adequate: [ x] YES [ ] NO    Parra:    NGT:      I&O's Detail    I & Os for current day (as of 28 Apr 2017 08:34)  =============================================  IN:    lactated ringers: 800 ml    Oral Fluid: 50 ml    Total IN: 850 ml  ---------------------------------------------  OUT:    Voided: 1250 ml    Total OUT: 1250 ml  ---------------------------------------------  Total NET: -400 ml    I&O's Summary    I & Os for current day (as of 28 Apr 2017 08:34)  =============================================  IN: 850 ml / OUT: 1250 ml / NET: -400 ml    I&O's Detail    I & Os for current day (as of 28 Apr 2017 08:34)  =============================================  IN:    lactated ringers: 800 ml    Oral Fluid: 50 ml    Total IN: 850 ml  ---------------------------------------------  OUT:    Voided: 1250 ml    Total OUT: 1250 ml  ---------------------------------------------  Total NET: -400 ml      MEDICATIONS  (STANDING):  levothyroxine 50MICROGram(s) Oral daily  gabapentin 300milliGRAM(s) Oral three times a day  carvedilol 6.25milliGRAM(s) Oral every 12 hours  donepezil 5milliGRAM(s) Oral at bedtime  tamsulosin 0.8milliGRAM(s) Oral at bedtime  lisinopril 10milliGRAM(s) Oral daily  amLODIPine   Tablet 5milliGRAM(s) Oral daily  heparin  Injectable 5000Unit(s) SubCutaneous every 8 hours  hydrochlorothiazide   Tablet 25milliGRAM(s) Oral daily    MEDICATIONS  (PRN):  HYDROmorphone  Injectable 0.5milliGRAM(s) IV Push every 6 hours PRN BREAKTHROUGH PAIN ONLY  HYDROmorphone  Injectable 0.5milliGRAM(s) IV Push every 3 hours PRN Moderate Pain (4 - 6)  acetaminophen  IVPB. 1000milliGRAM(s) IV Intermittent once PRN Mild Pain (1 - 3)      LABS:                        11.3   22.9  )-----------( 310      ( 28 Apr 2017 05:45 )             34.3     04-27    142  |  101  |  19.0  ----------------------------<  130<H>  3.6   |  23.0  |  1.05    Ca    8.9      27 Apr 2017 07:09              RADIOLOGY & ADDITIONAL STUDIES:

## 2017-04-28 NOTE — DISCHARGE NOTE ADULT - HOSPITAL COURSE
This is a 89 y.o male who presents to PST today accompanied by his daughter.  The pt is alert, but is confused at times, therefore, his daughter is in attendance and assisting the patient in answering questions.  As per the pt's daughter, the pt sustained a substantial weight loss of 25-30 lbs over a few months accompanied by abdominal pain.   A cat scan demonstrated a pancreatic mass.     4/24- Went to OR for robotic assisted distal pancreatectomy, splenectomy with Dr. Miranda. Pts wife passed away while patient in surgery.     Developed some difficulty swallowing with overt s/s of aspiration.   Speech and swallow eval 4/25 - recommends Maintain NPO status with consideration of short-term non-oral means of nutrition & hydration  yes    PT eval - ercommends home with home PT . Recs dscharge with walker.   Repeat speech and swallow eval 4/27 - Mild oral dysphagia marked by reduced lingual coordination; Severe pharyngeal dysphagia marked by reduced tongue base retraction, reduced hyolaryngeal excursion, reduced upper airway closure +silent penetration above the vocal folds with retrieval during the swallow and +silent penetration contacting the vocal folds without clearance after the swallow with puree.  +Silent penetration contacting the vocal folds without clearance after the swallow with honey thick liquids.  Osteophyte noted at c3-c4 impinging upon hypopharyx, further impacting stasis.  +Trace retrograde progression of PO to pyriform sinuses observed.  No aspiration observed, however, pt at risk for aspiration with all PO 2* above.    Barium swallow - These results showed osteophytes of C3-4 that were compressing on the hypopharynx causing the barium to pool into the area of the vocal chords.   Based on these results, speech therapy recommended to continue NPO status.  This was discussed with Dr. Miranda however, and based on the patient's age and absence of actual aspiration his decision was to advance the patient's diet to allow him to return to his prior eating habits.

## 2017-05-10 ENCOUNTER — APPOINTMENT (OUTPATIENT)
Dept: SURGICAL ONCOLOGY | Facility: CLINIC | Age: 82
End: 2017-05-10

## 2017-05-10 VITALS
OXYGEN SATURATION: 97 % | DIASTOLIC BLOOD PRESSURE: 69 MMHG | WEIGHT: 132.28 LBS | BODY MASS INDEX: 20.76 KG/M2 | HEIGHT: 67 IN | RESPIRATION RATE: 12 BRPM | TEMPERATURE: 97.5 F | HEART RATE: 59 BPM | SYSTOLIC BLOOD PRESSURE: 139 MMHG

## 2017-07-09 ENCOUNTER — INPATIENT (INPATIENT)
Facility: HOSPITAL | Age: 82
LOS: 1 days | Discharge: HOME | DRG: 683 | End: 2017-07-11
Attending: INTERNAL MEDICINE | Admitting: STUDENT IN AN ORGANIZED HEALTH CARE EDUCATION/TRAINING PROGRAM
Payer: COMMERCIAL

## 2017-07-09 VITALS
OXYGEN SATURATION: 96 % | HEIGHT: 65 IN | HEART RATE: 85 BPM | DIASTOLIC BLOOD PRESSURE: 50 MMHG | WEIGHT: 130.07 LBS | TEMPERATURE: 98 F | RESPIRATION RATE: 16 BRPM | SYSTOLIC BLOOD PRESSURE: 146 MMHG

## 2017-07-09 DIAGNOSIS — R91.1 SOLITARY PULMONARY NODULE: ICD-10-CM

## 2017-07-09 DIAGNOSIS — Z98.890 OTHER SPECIFIED POSTPROCEDURAL STATES: Chronic | ICD-10-CM

## 2017-07-09 DIAGNOSIS — C25.9 MALIGNANT NEOPLASM OF PANCREAS, UNSPECIFIED: ICD-10-CM

## 2017-07-09 DIAGNOSIS — I10 ESSENTIAL (PRIMARY) HYPERTENSION: ICD-10-CM

## 2017-07-09 DIAGNOSIS — F03.90 UNSPECIFIED DEMENTIA, UNSPECIFIED SEVERITY, WITHOUT BEHAVIORAL DISTURBANCE, PSYCHOTIC DISTURBANCE, MOOD DISTURBANCE, AND ANXIETY: ICD-10-CM

## 2017-07-09 DIAGNOSIS — Z29.9 ENCOUNTER FOR PROPHYLACTIC MEASURES, UNSPECIFIED: ICD-10-CM

## 2017-07-09 DIAGNOSIS — N17.9 ACUTE KIDNEY FAILURE, UNSPECIFIED: ICD-10-CM

## 2017-07-09 DIAGNOSIS — E03.9 HYPOTHYROIDISM, UNSPECIFIED: ICD-10-CM

## 2017-07-09 DIAGNOSIS — N40.0 BENIGN PROSTATIC HYPERPLASIA WITHOUT LOWER URINARY TRACT SYMPTOMS: ICD-10-CM

## 2017-07-09 DIAGNOSIS — J18.9 PNEUMONIA, UNSPECIFIED ORGANISM: ICD-10-CM

## 2017-07-09 DIAGNOSIS — E87.1 HYPO-OSMOLALITY AND HYPONATREMIA: ICD-10-CM

## 2017-07-09 DIAGNOSIS — A41.9 SEPSIS, UNSPECIFIED ORGANISM: ICD-10-CM

## 2017-07-09 DIAGNOSIS — Z90.81 ACQUIRED ABSENCE OF SPLEEN: Chronic | ICD-10-CM

## 2017-07-09 LAB
ALBUMIN SERPL ELPH-MCNC: 4 G/DL — SIGNIFICANT CHANGE UP (ref 3.3–5)
ALP SERPL-CCNC: 64 U/L — SIGNIFICANT CHANGE UP (ref 40–120)
ALT FLD-CCNC: 13 U/L — SIGNIFICANT CHANGE UP (ref 12–78)
ANION GAP SERPL CALC-SCNC: 6 MMOL/L — SIGNIFICANT CHANGE UP (ref 5–17)
APPEARANCE UR: CLEAR — SIGNIFICANT CHANGE UP
APTT BLD: 24.8 SEC — LOW (ref 27.5–37.4)
AST SERPL-CCNC: 15 U/L — SIGNIFICANT CHANGE UP (ref 15–37)
BASOPHILS # BLD AUTO: 0.1 K/UL — SIGNIFICANT CHANGE UP (ref 0–0.2)
BASOPHILS NFR BLD AUTO: 0.4 % — SIGNIFICANT CHANGE UP (ref 0–2)
BILIRUB SERPL-MCNC: 2 MG/DL — HIGH (ref 0.2–1.2)
BILIRUB UR-MCNC: NEGATIVE — SIGNIFICANT CHANGE UP
BUN SERPL-MCNC: 37 MG/DL — HIGH (ref 7–23)
CALCIUM SERPL-MCNC: 8.5 MG/DL — SIGNIFICANT CHANGE UP (ref 8.5–10.1)
CHLORIDE SERPL-SCNC: 97 MMOL/L — SIGNIFICANT CHANGE UP (ref 96–108)
CO2 SERPL-SCNC: 30 MMOL/L — SIGNIFICANT CHANGE UP (ref 22–31)
COLOR SPEC: SIGNIFICANT CHANGE UP
CREAT SERPL-MCNC: 1.5 MG/DL — HIGH (ref 0.5–1.3)
CRP SERPL-MCNC: 4.1 MG/DL — HIGH (ref 0–0.4)
DIFF PNL FLD: NEGATIVE — SIGNIFICANT CHANGE UP
EOSINOPHIL # BLD AUTO: 0.1 K/UL — SIGNIFICANT CHANGE UP (ref 0–0.5)
EOSINOPHIL NFR BLD AUTO: 0.7 % — SIGNIFICANT CHANGE UP (ref 0–6)
GLUCOSE SERPL-MCNC: 129 MG/DL — HIGH (ref 70–99)
GLUCOSE UR QL: NEGATIVE — SIGNIFICANT CHANGE UP
HCT VFR BLD CALC: 47.4 % — SIGNIFICANT CHANGE UP (ref 39–50)
HGB BLD-MCNC: 15 G/DL — SIGNIFICANT CHANGE UP (ref 13–17)
INR BLD: 0.97 RATIO — SIGNIFICANT CHANGE UP (ref 0.88–1.16)
KETONES UR-MCNC: NEGATIVE — SIGNIFICANT CHANGE UP
LACTATE SERPL-SCNC: 1.8 MMOL/L — SIGNIFICANT CHANGE UP (ref 0.7–2)
LEUKOCYTE ESTERASE UR-ACNC: NEGATIVE — SIGNIFICANT CHANGE UP
LYMPHOCYTES # BLD AUTO: 11.7 % — LOW (ref 13–44)
LYMPHOCYTES # BLD AUTO: 2.1 K/UL — SIGNIFICANT CHANGE UP (ref 1–3.3)
MCHC RBC-ENTMCNC: 27.7 PG — SIGNIFICANT CHANGE UP (ref 27–34)
MCHC RBC-ENTMCNC: 31.6 GM/DL — LOW (ref 32–36)
MCV RBC AUTO: 87.7 FL — SIGNIFICANT CHANGE UP (ref 80–100)
MONOCYTES # BLD AUTO: 0.3 K/UL — SIGNIFICANT CHANGE UP (ref 0–0.9)
MONOCYTES NFR BLD AUTO: 1.5 % — SIGNIFICANT CHANGE UP (ref 1–9)
NEUTROPHILS # BLD AUTO: 15 K/UL — HIGH (ref 1.8–7.4)
NEUTROPHILS NFR BLD AUTO: 85.6 % — HIGH (ref 43–77)
NITRITE UR-MCNC: NEGATIVE — SIGNIFICANT CHANGE UP
PH UR: 6.5 — SIGNIFICANT CHANGE UP (ref 5–8)
PLATELET # BLD AUTO: 280 K/UL — SIGNIFICANT CHANGE UP (ref 150–400)
POTASSIUM SERPL-MCNC: 4.1 MMOL/L — SIGNIFICANT CHANGE UP (ref 3.5–5.3)
POTASSIUM SERPL-SCNC: 4.1 MMOL/L — SIGNIFICANT CHANGE UP (ref 3.5–5.3)
PROCALCITONIN SERPL-MCNC: 0.07 NG/ML — HIGH (ref 0–0.04)
PROT SERPL-MCNC: 8 G/DL — SIGNIFICANT CHANGE UP (ref 6–8.3)
PROT UR-MCNC: 25 MG/DL
PROTHROM AB SERPL-ACNC: 10.6 SEC — SIGNIFICANT CHANGE UP (ref 9.8–12.7)
RBC # BLD: 5.41 M/UL — SIGNIFICANT CHANGE UP (ref 4.2–5.8)
RBC # FLD: 14.6 % — HIGH (ref 10.3–14.5)
SODIUM SERPL-SCNC: 133 MMOL/L — LOW (ref 135–145)
SP GR SPEC: 1 — LOW (ref 1.01–1.02)
UROBILINOGEN FLD QL: NEGATIVE — SIGNIFICANT CHANGE UP
WBC # BLD: 17.6 K/UL — HIGH (ref 3.8–10.5)
WBC # FLD AUTO: 17.6 K/UL — HIGH (ref 3.8–10.5)

## 2017-07-09 PROCEDURE — 71010: CPT | Mod: 26

## 2017-07-09 PROCEDURE — 99285 EMERGENCY DEPT VISIT HI MDM: CPT

## 2017-07-09 PROCEDURE — 93010 ELECTROCARDIOGRAM REPORT: CPT

## 2017-07-09 PROCEDURE — 71250 CT THORAX DX C-: CPT | Mod: 26

## 2017-07-09 PROCEDURE — 99223 1ST HOSP IP/OBS HIGH 75: CPT | Mod: AI,GC

## 2017-07-09 RX ORDER — SENNA PLUS 8.6 MG/1
2 TABLET ORAL AT BEDTIME
Qty: 0 | Refills: 0 | Status: DISCONTINUED | OUTPATIENT
Start: 2017-07-09 | End: 2017-07-11

## 2017-07-09 RX ORDER — FINASTERIDE 5 MG/1
5 TABLET, FILM COATED ORAL DAILY
Qty: 0 | Refills: 0 | Status: DISCONTINUED | OUTPATIENT
Start: 2017-07-09 | End: 2017-07-11

## 2017-07-09 RX ORDER — LEVOTHYROXINE SODIUM 125 MCG
50 TABLET ORAL DAILY
Qty: 0 | Refills: 0 | Status: DISCONTINUED | OUTPATIENT
Start: 2017-07-09 | End: 2017-07-11

## 2017-07-09 RX ORDER — DOCUSATE SODIUM 100 MG
100 CAPSULE ORAL THREE TIMES A DAY
Qty: 0 | Refills: 0 | Status: DISCONTINUED | OUTPATIENT
Start: 2017-07-09 | End: 2017-07-11

## 2017-07-09 RX ORDER — SODIUM CHLORIDE 9 MG/ML
1000 INJECTION INTRAMUSCULAR; INTRAVENOUS; SUBCUTANEOUS ONCE
Qty: 0 | Refills: 0 | Status: COMPLETED | OUTPATIENT
Start: 2017-07-09 | End: 2017-07-09

## 2017-07-09 RX ORDER — POTASSIUM CHLORIDE 20 MEQ
0 PACKET (EA) ORAL
Qty: 0 | Refills: 0 | COMMUNITY

## 2017-07-09 RX ORDER — CARVEDILOL PHOSPHATE 80 MG/1
6.25 CAPSULE, EXTENDED RELEASE ORAL EVERY 12 HOURS
Qty: 0 | Refills: 0 | Status: DISCONTINUED | OUTPATIENT
Start: 2017-07-09 | End: 2017-07-11

## 2017-07-09 RX ORDER — DONEPEZIL HYDROCHLORIDE 10 MG/1
10 TABLET, FILM COATED ORAL AT BEDTIME
Qty: 0 | Refills: 0 | Status: DISCONTINUED | OUTPATIENT
Start: 2017-07-09 | End: 2017-07-11

## 2017-07-09 RX ORDER — GABAPENTIN 400 MG/1
300 CAPSULE ORAL THREE TIMES A DAY
Qty: 0 | Refills: 0 | Status: DISCONTINUED | OUTPATIENT
Start: 2017-07-09 | End: 2017-07-09

## 2017-07-09 RX ORDER — SODIUM CHLORIDE 9 MG/ML
3 INJECTION INTRAMUSCULAR; INTRAVENOUS; SUBCUTANEOUS ONCE
Qty: 0 | Refills: 0 | Status: COMPLETED | OUTPATIENT
Start: 2017-07-09 | End: 2017-07-09

## 2017-07-09 RX ORDER — CEFTRIAXONE 500 MG/1
1 INJECTION, POWDER, FOR SOLUTION INTRAMUSCULAR; INTRAVENOUS EVERY 24 HOURS
Qty: 0 | Refills: 0 | Status: DISCONTINUED | OUTPATIENT
Start: 2017-07-10 | End: 2017-07-10

## 2017-07-09 RX ORDER — AZITHROMYCIN 500 MG/1
500 TABLET, FILM COATED ORAL EVERY 24 HOURS
Qty: 0 | Refills: 0 | Status: DISCONTINUED | OUTPATIENT
Start: 2017-07-10 | End: 2017-07-10

## 2017-07-09 RX ORDER — AMLODIPINE BESYLATE 2.5 MG/1
5 TABLET ORAL DAILY
Qty: 0 | Refills: 0 | Status: DISCONTINUED | OUTPATIENT
Start: 2017-07-09 | End: 2017-07-11

## 2017-07-09 RX ORDER — ACETAMINOPHEN 500 MG
650 TABLET ORAL EVERY 6 HOURS
Qty: 0 | Refills: 0 | Status: DISCONTINUED | OUTPATIENT
Start: 2017-07-09 | End: 2017-07-11

## 2017-07-09 RX ORDER — SODIUM CHLORIDE 9 MG/ML
1000 INJECTION INTRAMUSCULAR; INTRAVENOUS; SUBCUTANEOUS
Qty: 0 | Refills: 0 | Status: DISCONTINUED | OUTPATIENT
Start: 2017-07-09 | End: 2017-07-10

## 2017-07-09 RX ORDER — HEPARIN SODIUM 5000 [USP'U]/ML
5000 INJECTION INTRAVENOUS; SUBCUTANEOUS EVERY 12 HOURS
Qty: 0 | Refills: 0 | Status: DISCONTINUED | OUTPATIENT
Start: 2017-07-09 | End: 2017-07-11

## 2017-07-09 RX ORDER — AZITHROMYCIN 500 MG/1
500 TABLET, FILM COATED ORAL ONCE
Qty: 0 | Refills: 0 | Status: COMPLETED | OUTPATIENT
Start: 2017-07-09 | End: 2017-07-09

## 2017-07-09 RX ORDER — TAMSULOSIN HYDROCHLORIDE 0.4 MG/1
0.4 CAPSULE ORAL AT BEDTIME
Qty: 0 | Refills: 0 | Status: DISCONTINUED | OUTPATIENT
Start: 2017-07-09 | End: 2017-07-11

## 2017-07-09 RX ORDER — CEFTRIAXONE 500 MG/1
1 INJECTION, POWDER, FOR SOLUTION INTRAMUSCULAR; INTRAVENOUS ONCE
Qty: 0 | Refills: 0 | Status: COMPLETED | OUTPATIENT
Start: 2017-07-09 | End: 2017-07-09

## 2017-07-09 RX ORDER — CHOLECALCIFEROL (VITAMIN D3) 125 MCG
1000 CAPSULE ORAL DAILY
Qty: 0 | Refills: 0 | Status: DISCONTINUED | OUTPATIENT
Start: 2017-07-09 | End: 2017-07-11

## 2017-07-09 RX ADMIN — AZITHROMYCIN 255 MILLIGRAM(S): 500 TABLET, FILM COATED ORAL at 13:13

## 2017-07-09 RX ADMIN — SODIUM CHLORIDE 2000 MILLILITER(S): 9 INJECTION INTRAMUSCULAR; INTRAVENOUS; SUBCUTANEOUS at 11:28

## 2017-07-09 RX ADMIN — SODIUM CHLORIDE 3 MILLILITER(S): 9 INJECTION INTRAMUSCULAR; INTRAVENOUS; SUBCUTANEOUS at 10:47

## 2017-07-09 RX ADMIN — Medication 100 MILLIGRAM(S): at 21:11

## 2017-07-09 RX ADMIN — DONEPEZIL HYDROCHLORIDE 10 MILLIGRAM(S): 10 TABLET, FILM COATED ORAL at 21:11

## 2017-07-09 RX ADMIN — CARVEDILOL PHOSPHATE 6.25 MILLIGRAM(S): 80 CAPSULE, EXTENDED RELEASE ORAL at 18:02

## 2017-07-09 RX ADMIN — CEFTRIAXONE 100 GRAM(S): 500 INJECTION, POWDER, FOR SOLUTION INTRAMUSCULAR; INTRAVENOUS at 12:27

## 2017-07-09 RX ADMIN — SODIUM CHLORIDE 2000 MILLILITER(S): 9 INJECTION INTRAMUSCULAR; INTRAVENOUS; SUBCUTANEOUS at 11:12

## 2017-07-09 RX ADMIN — SODIUM CHLORIDE 75 MILLILITER(S): 9 INJECTION INTRAMUSCULAR; INTRAVENOUS; SUBCUTANEOUS at 18:54

## 2017-07-09 NOTE — H&P ADULT - PROBLEM SELECTOR PLAN 6
likely due to dehydration, will monitor BMP  check urine lytes, osmolality likely due to dehydration; has hx of renal cysts  continue IV fluids, avoid nephrotoxic agents  hold ACE-I and diuretic at this time  monitor renal indices

## 2017-07-09 NOTE — ED PROVIDER NOTE - PMH
Bladder cancer  H/O 2013  Dementia    Hypertension    Hypertrophy of prostate    Hypothyroidism    Pancreatic cancer    Pneumonia    Renal cyst    Risk factors for obstructive sleep apnea

## 2017-07-09 NOTE — H&P ADULT - ATTENDING COMMENTS
I personally conducted a physical examination and gathered the patient's history. I discussed the above listed findings w/ the patient and family members. I agree with the above listed findings. The family agrees w/ the plan as detailed above.    This is a 89yo M w/ multiple medical co-morbidities including pancreatic CA (started on chemo 4 days ago) s/p surgical procedure which family describes as a Whipple, bladder CA, HTN, BPH, dementia, hypothyroidism. The pt presents w/ a vague story describing pre-syncope and period of drowsiness. No seizure-like activity. No LOC described. During my conversation w/ the pt he has periods of lethargy but is readily arousable. He is non-toxic appearing, however is obviously fatigued. I agree that consulting ID and hematology at this time which antibiotic coverage for HCAP is indicated. Will f/u consultant recommendations and re-assess after their consultation.

## 2017-07-09 NOTE — H&P ADULT - PSH
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

## 2017-07-09 NOTE — H&P ADULT - PROBLEM SELECTOR PLAN 2
continue donepezil  will start with mechanical soft diet; speech and swallow consult appreciate heme/onc consult (Dr. Tong)  patient had multiple cancers with no pulmonary involvement to family's knowledge

## 2017-07-09 NOTE — H&P ADULT - HISTORY OF PRESENT ILLNESS
89 y/o M with PMHx of pancreatic CA (started on chemo 4 days ago), bladder CA, HTN, BPH, dementia presents with episode of near syncope after standing in [employer].     In the ED, patient's labs were significant for WBC 17.6, mild hyponatremia with Na of 133, LIT with BUN/Cr of 37/1.50, procalc of 0.07. CXR showed no gross consolidation. CT Chest showed 4 mm nodule, subpleural, right upper lobe. Ill-defined groundglass nodule measuring 6 mm right lower lobe best appreciated on axial imaging, series 2 image 99.  Linear scarring, stranding, left lower lobe. No vascular congestion. No effusion. No lobar consolidation. No pneumothorax. No pericardial effusion. No thoracic aortic aneurysm. Calcified coronary arteries evident. Hiatus hernia present.Central airway intact. Thyroid gland not enlarged. No mediastinal or hilar lesions evident, evaluation limited due to lack of IV contrast. Bilateral renal cysts present. The spleen is not identified, correlate clinically. Hypodensity right hepatic lobe likely a cyst too small to   characterize. No acute osseous abnormalities. Received ceftriaxone/azithro x1, 2L NS boluses 89 y/o M with PMHx of pancreatic CA (started on chemo 4 days ago), bladder CA, HTN, BPH, dementia presents with episode of near syncope after standing in [employer]. Daughters Tatiana and Ricky at bedside. States that patient is fully independent. Patient has had mild cough since the morning of admission, states that he feels as though his throat is dry. Patient has just started     In the ED, patient's labs were significant for WBC 17.6, mild hyponatremia with Na of 133, LIT with BUN/Cr of 37/1.50, procalc of 0.07. CXR showed no gross consolidation. CT Chest showed 4 mm nodule, subpleural, right upper lobe. Ill-defined groundglass nodule measuring 6 mm right lower lobe best appreciated on axial imaging, series 2 image 99.  Linear scarring, stranding, left lower lobe. No vascular congestion. No effusion. No lobar consolidation. No pneumothorax. No pericardial effusion. No thoracic aortic aneurysm. Calcified coronary arteries evident. Hiatus hernia present.Central airway intact. Thyroid gland not enlarged. No mediastinal or hilar lesions evident, evaluation limited due to lack of IV contrast. Bilateral renal cysts present. The spleen is not identified, correlate clinically. Hypodensity right hepatic lobe likely a cyst too small to   characterize. No acute osseous abnormalities. Received ceftriaxone/azithro x1, 2L NS boluses 89 y/o M with PMHx of pancreatic CA (started on chemo 4 days ago), bladder CA, HTN, BPH, dementia presents with episode of near syncope after standing in [employer]. Daughters Tatiana and Ricky at bedside. States that patient is fully independent. Patient has had mild cough since the morning of admission, states that he feels as though his throat is dry. Patient has just started chemotherapy on Thurs 7/6 (Gemzar), with no apparent side effects aside from staying awake. Patient's family states that he had a recent PET scan within the last month which was negative. Patient does not remember the circumstances of his episode. However, family states that his hands started shaking and he had a blank stare and started drooling. Admitted to some lightheadedness. Denies fevers, chills, CP, SOB, n/v/d.    In the ED, patient's labs were significant for WBC 17.6, mild hyponatremia with Na of 133, LIT with BUN/Cr of 37/1.50, procalc of 0.07. CXR showed no gross consolidation. CT Chest showed 4 mm nodule, subpleural, right upper lobe. Ill-defined groundglass nodule measuring 6 mm right lower lobe best appreciated on axial imaging, series 2 image 99.  Linear scarring, stranding, left lower lobe. No vascular congestion. No effusion. No lobar consolidation. No pneumothorax. No pericardial effusion. No thoracic aortic aneurysm. Calcified coronary arteries evident. Hiatus hernia present.Central airway intact. Thyroid gland not enlarged. No mediastinal or hilar lesions evident, evaluation limited due to lack of IV contrast. Bilateral renal cysts present. The spleen is not identified, correlate clinically. Hypodensity right hepatic lobe likely a cyst too small to   characterize. No acute osseous abnormalities. Received ceftriaxone/azithro x1, 2L NS boluses 89 y/o M with PMHx of pancreatic CA (started on chemo 4 days ago), bladder CA, HTN, BPH, dementia presents with episode of near syncope after standing in [employer]. Daughters Tatiana and Ricky at bedside. States that patient is fully independent. Patient has had mild cough since the morning of admission, states that he feels as though his throat is dry. Patient has just started chemotherapy on Thurs 7/6 (Gemzar), with no apparent side effects aside from staying awake. Patient's family states that he had a recent PET scan within the last month which was negative. Patient does not remember the circumstances of his episode. However, family states that his hands started shaking and he had a blank stare and started drooling. Admitted to some lightheadedness. Admits to constipation for the past 2 days. Denies fevers, chills, CP, SOB, n/v/d.    In the ED, patient's labs were significant for WBC 17.6, mild hyponatremia with Na of 133, LIT with BUN/Cr of 37/1.50, procalc of 0.07. CXR showed no gross consolidation. CT Chest showed 4 mm nodule, subpleural, right upper lobe. Ill-defined groundglass nodule measuring 6 mm right lower lobe best appreciated on axial imaging, series 2 image 99.  Linear scarring, stranding, left lower lobe. No vascular congestion. No effusion. No lobar consolidation. No pneumothorax. No pericardial effusion. No thoracic aortic aneurysm. Calcified coronary arteries evident. Hiatus hernia present.Central airway intact. Thyroid gland not enlarged. No mediastinal or hilar lesions evident, evaluation limited due to lack of IV contrast. Bilateral renal cysts present. The spleen is not identified, correlate clinically. Hypodensity right hepatic lobe likely a cyst too small to   characterize. No acute osseous abnormalities. Received ceftriaxone/azithro x1, 2L NS boluses 89 y/o M with PMHx of pancreatic CA (started on chemo 4 days ago), bladder CA, HTN, BPH, dementia, hypothyroidism presents with episode of near syncope after standing in [employer]. Daughters Tatiana and Ricky at bedside. States that patient is fully independent. Patient has had mild cough since the morning of admission, states that he feels as though his throat is dry. Patient has just started chemotherapy on Thurs 7/6 (Gemzar), with no apparent side effects aside from staying awake. Patient's family states that he had a recent PET scan within the last month which was negative. Patient does not remember the circumstances of his episode. However, family states that his hands started shaking and he had a blank stare and started drooling. Admitted to some lightheadedness. Admits to constipation for the past 2 days. Denies fevers, chills, CP, SOB, n/v/d.    In the ED, patient's labs were significant for WBC 17.6, mild hyponatremia with Na of 133, LIT with BUN/Cr of 37/1.50, procalc of 0.07. CXR showed no gross consolidation. CT Chest showed 4 mm nodule, subpleural, right upper lobe. Ill-defined groundglass nodule measuring 6 mm right lower lobe best appreciated on axial imaging, series 2 image 99.  Linear scarring, stranding, left lower lobe. No vascular congestion. No effusion. No lobar consolidation. No pneumothorax. No pericardial effusion. No thoracic aortic aneurysm. Calcified coronary arteries evident. Hiatus hernia present.Central airway intact. Thyroid gland not enlarged. No mediastinal or hilar lesions evident, evaluation limited due to lack of IV contrast. Bilateral renal cysts present. The spleen is not identified, correlate clinically. Hypodensity right hepatic lobe likely a cyst too small to   characterize. No acute osseous abnormalities. Received ceftriaxone/azithro x1, 2L NS boluses

## 2017-07-09 NOTE — H&P ADULT - PROBLEM SELECTOR PLAN 10
heparin subQ for VTE prophylaxis  fall precautions, aspiration precautions  colace and senna for constipation  speech and swallow eval heparin subQ for VTE prophylaxis  fall precautions, aspiration precautions  colace and senna for constipation  speech and swallow eval  mechanical soft diet

## 2017-07-09 NOTE — ED ADULT NURSE NOTE - PMH
Bladder cancer  H/O 2013  Dementia    Hypertension    Hypertrophy of prostate    Hypothyroidism    Pneumonia    Renal cyst    Risk factors for obstructive sleep apnea Bladder cancer  H/O 2013  Dementia    Hypertension    Hypertrophy of prostate    Hypothyroidism    Pancreatic cancer    Pneumonia    Renal cyst    Risk factors for obstructive sleep apnea

## 2017-07-09 NOTE — H&P ADULT - NSHPPHYSICALEXAM_GEN_ALL_CORE
Physical Exam:  General: Well developed, well nourished; appears lethargic  HEENT: NCAT, PERRLA, EOMI bl, moist mucous membranes   Neck: Supple, nontender, no mass  Neurology: A&Ox3, nonfocal, grossly neurologically intact  Respiratory: decreased breath sounds; +rhonchi left lung fields  CV: RRR, +S1/S2, no murmurs, rubs or gallops  Abdominal: Soft, NT, ND +BSx4  Extremities: No C/C/E, + peripheral pulses  MSK: Normal ROM, no joint erythema or warmth, no joint swelling   Skin: warm, dry, normal color, no rash or abnormal lesions

## 2017-07-09 NOTE — ED PROVIDER NOTE - CONSTITUTIONAL, MLM
normal... Elderly white male, well nourished, awake, alert, oriented to person, place, time/situation with shaking chills but in no apparent distress.

## 2017-07-09 NOTE — H&P ADULT - ASSESSMENT
89 y/o M with PMHx of pancreatic CA (started on chemo 4 days ago), bladder CA, HTN, BPH, dementia 89 y/o M with PMHx of pancreatic CA (started on chemo 4 days ago), bladder CA, HTN, BPH, dementia admitted for sepsis likely 2/2 community acquired pneumonia. 89 y/o M with PMHx of pancreatic CA (started on chemo 4 days ago), bladder CA, HTN, BPH, dementia admitted with near-syncope found to have pneumonia. 89 y/o M with PMHx of pancreatic CA (started on chemo 4 days ago), bladder CA, HTN, BPH, dementia presenting with near-syncope admitted with pneumonia. 89 y/o M with PMHx of pancreatic CA (started on chemo 4 days ago), bladder CA, HTN, BPH, dementia, hypothyroidism presenting with near-syncope admitted with pneumonia.

## 2017-07-09 NOTE — H&P ADULT - PROBLEM SELECTOR PLAN 5
likely due to dehydration; has hx of renal cysts  continue IV fluids, avoid nephrotoxic agents  monitor renal indices patient started chemotherapy on 7/6/2017  appreciate heme onc recs

## 2017-07-09 NOTE — H&P ADULT - PROBLEM SELECTOR PLAN 3
appreciate heme/onc consult (Dr. Tong)  patient had multiple cancers with no pulmonary involvement to family's knowledge continue donepezil  will start with mechanical soft diet; speech and swallow consult

## 2017-07-09 NOTE — H&P ADULT - PROBLEM SELECTOR PLAN 8
heparin subQ for VTE prophylaxis  fall precautions, aspiration precautions  speech and swallow eval continue flomax and proscar

## 2017-07-09 NOTE — ED ADULT NURSE NOTE - PSH
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

## 2017-07-09 NOTE — H&P ADULT - PROBLEM SELECTOR PLAN 4
patient started chemotherapy on 7/6/2017  appreciate heme onc recs continue carvedilol with hold parameters  hold ACE-I and diuretic due to LIT continue carvedilol and amlodipine with hold parameters  hold ACE-I and diuretic due to LIT continue carvedilol and amlodipine with holding parameters  hold ACE-I and diuretic due to LIT

## 2017-07-09 NOTE — H&P ADULT - NSHPSOCIALHISTORY_GEN_ALL_CORE
, lives at home alone; daughter 10 min away  Ambulates independently, drives  Former smoker 1ppd x 20 years; quit 30+ years ago  Social ETOH 1 x a week beer/wine    Flu shot: dec 2016  Pna shot: June 2016  TD: 2008  Meningococcal: 4/21/2017 , lives at home alone; daughter 10 min away  Ambulates independently, drives  Former smoker 1ppd x 20 years; quit 30+ years ago  Social ETOH 1 x a week beer/wine    Advance Directives: none (full code per family)    Flu shot: dec 2016  Pna shot: June 2016  TD: 2008  Meningococcal: 4/21/2017

## 2017-07-09 NOTE — ED PROVIDER NOTE - OBJECTIVE STATEMENT
89 yo white male, started CTx for pancreatic Ca 4-days ago and was well until this morning when he started with minimal cough. He then went to [employer] where after standing for a while where after standing for a while he developed lightheadedness and near syncope. EMS was called and upon presenting to ED patient was found to have cold sweats and shaking chills. No CP/AP/Dysuria or headache.

## 2017-07-09 NOTE — H&P ADULT - PROBLEM SELECTOR PLAN 7
continue flomax and proscar likely due to dehydration, will monitor BMP  check urine lytes, osmolality likely due to dehydration, will monitor BMP  check urine lytes, osmolality in AM

## 2017-07-09 NOTE — H&P ADULT - PROBLEM SELECTOR PLAN 1
admit to [facility]  ?HCAP due to recent exposure to chemotherapy infusion  will continue ceftriaxone and rocephin for CAP coverage at this time; will appreciate ID consult due to patient's immunocompromised state  f/u Blood Cultures, Urine Cultures; check legionella antigen  monitor for fever, treat with Tylenol PRN admit to GMF  ?HCAP due to recent exposure to chemotherapy infusion  will continue ceftriaxone and rocephin for CAP coverage at this time; will appreciate ID consult due to patient's immunocompromised state and elevated CRP  f/u Blood Cultures, Urine Cultures; check legionella antigen  monitor for fever, treat with Tylenol PRN admit to [facility]F  ?HCAP due to recent exposure to chemotherapy infusion  will continue ceftriaxone and rocephin for CAP coverage at this time; will appreciate ID consult due to patient's immunocompromised state and elevated CRP  f/u Blood Cultures, Urine Cultures; check legionella antigen  near syncope possibly secondary to dehydration, will continue IV fluids  monitor for fever, treat with Tylenol PRN admit to [facility]F  ?HCAP due to recent exposure to chemotherapy infusion  will continue ceftriaxone and rocephin for CAP coverage at this time; will appreciate ID consult due to patient's immunocompromised state and elevated CRP  f/u Blood Cultures, Urine Cultures; check legionella antigen  today's episode most likely secondary to dehydration, will continue IV fluids  monitor for fever, treat with Tylenol PRN

## 2017-07-09 NOTE — ED ADULT NURSE NOTE - OBJECTIVE STATEMENT
pt states he was in [employer] this morning and fainted. pt presents with coughing up phlegm that started today. pt denies any other issues with the exception of feeling cold. pt states he started chemo Thursday for pancreatic cancer. pts skin is diaphoretic. pt with rigors

## 2017-07-09 NOTE — H&P ADULT - PROBLEM SELECTOR PLAN 9
heparin subQ for VTE prophylaxis  fall precautions, aspiration precautions  speech and swallow eval heparin subQ for VTE prophylaxis  fall precautions, aspiration precautions  colace and senna for constipation  speech and swallow eval continue synthroid

## 2017-07-09 NOTE — PATIENT PROFILE ADULT. - REASON FOR ADMISSION
As per the daughter "he has a pancreatic tail mass and his spleen removed as well" passed out [employer]

## 2017-07-10 DIAGNOSIS — C25.9 MALIGNANT NEOPLASM OF PANCREAS, UNSPECIFIED: ICD-10-CM

## 2017-07-10 DIAGNOSIS — D72.829 ELEVATED WHITE BLOOD CELL COUNT, UNSPECIFIED: ICD-10-CM

## 2017-07-10 LAB
ANION GAP SERPL CALC-SCNC: 8 MMOL/L — SIGNIFICANT CHANGE UP (ref 5–17)
BASOPHILS # BLD AUTO: 0.1 K/UL — SIGNIFICANT CHANGE UP (ref 0–0.2)
BASOPHILS NFR BLD AUTO: 0.4 % — SIGNIFICANT CHANGE UP (ref 0–2)
BUN SERPL-MCNC: 32 MG/DL — HIGH (ref 7–23)
CALCIUM SERPL-MCNC: 7.6 MG/DL — LOW (ref 8.5–10.1)
CHLORIDE SERPL-SCNC: 105 MMOL/L — SIGNIFICANT CHANGE UP (ref 96–108)
CO2 SERPL-SCNC: 25 MMOL/L — SIGNIFICANT CHANGE UP (ref 22–31)
CREAT SERPL-MCNC: 1.2 MG/DL — SIGNIFICANT CHANGE UP (ref 0.5–1.3)
CULTURE RESULTS: NO GROWTH — SIGNIFICANT CHANGE UP
EOSINOPHIL # BLD AUTO: 0.1 K/UL — SIGNIFICANT CHANGE UP (ref 0–0.5)
EOSINOPHIL NFR BLD AUTO: 0.3 % — SIGNIFICANT CHANGE UP (ref 0–6)
GLUCOSE SERPL-MCNC: 102 MG/DL — HIGH (ref 70–99)
HCT VFR BLD CALC: 37.2 % — LOW (ref 39–50)
HGB BLD-MCNC: 11.8 G/DL — LOW (ref 13–17)
LYMPHOCYTES # BLD AUTO: 13.9 % — SIGNIFICANT CHANGE UP (ref 13–44)
LYMPHOCYTES # BLD AUTO: 2 K/UL — SIGNIFICANT CHANGE UP (ref 1–3.3)
MCHC RBC-ENTMCNC: 27.3 PG — SIGNIFICANT CHANGE UP (ref 27–34)
MCHC RBC-ENTMCNC: 31.6 GM/DL — LOW (ref 32–36)
MCV RBC AUTO: 86.3 FL — SIGNIFICANT CHANGE UP (ref 80–100)
MONOCYTES # BLD AUTO: 0.2 K/UL — SIGNIFICANT CHANGE UP (ref 0–0.9)
MONOCYTES NFR BLD AUTO: 1.2 % — SIGNIFICANT CHANGE UP (ref 1–9)
NEUTROPHILS # BLD AUTO: 12.4 K/UL — HIGH (ref 1.8–7.4)
NEUTROPHILS NFR BLD AUTO: 84.2 % — HIGH (ref 43–77)
OSMOLALITY SERPL: 288 MOS/KG — SIGNIFICANT CHANGE UP (ref 275–300)
PLATELET # BLD AUTO: 198 K/UL — SIGNIFICANT CHANGE UP (ref 150–400)
POTASSIUM SERPL-MCNC: 3.1 MMOL/L — LOW (ref 3.5–5.3)
POTASSIUM SERPL-SCNC: 3.1 MMOL/L — LOW (ref 3.5–5.3)
RBC # BLD: 4.31 M/UL — SIGNIFICANT CHANGE UP (ref 4.2–5.8)
RBC # FLD: 14.3 % — SIGNIFICANT CHANGE UP (ref 10.3–14.5)
SODIUM SERPL-SCNC: 138 MMOL/L — SIGNIFICANT CHANGE UP (ref 135–145)
SPECIMEN SOURCE: SIGNIFICANT CHANGE UP
WBC # BLD: 14.8 K/UL — HIGH (ref 3.8–10.5)
WBC # FLD AUTO: 14.8 K/UL — HIGH (ref 3.8–10.5)

## 2017-07-10 PROCEDURE — 99233 SBSQ HOSP IP/OBS HIGH 50: CPT

## 2017-07-10 RX ORDER — SODIUM,POTASSIUM PHOSPHATES 278-250MG
1 POWDER IN PACKET (EA) ORAL EVERY 6 HOURS
Qty: 0 | Refills: 0 | Status: COMPLETED | OUTPATIENT
Start: 2017-07-10 | End: 2017-07-10

## 2017-07-10 RX ORDER — POTASSIUM PHOSPHATE, MONOBASIC POTASSIUM PHOSPHATE, DIBASIC 236; 224 MG/ML; MG/ML
15 INJECTION, SOLUTION INTRAVENOUS ONCE
Qty: 0 | Refills: 0 | Status: DISCONTINUED | OUTPATIENT
Start: 2017-07-10 | End: 2017-07-10

## 2017-07-10 RX ORDER — POTASSIUM CHLORIDE 20 MEQ
40 PACKET (EA) ORAL ONCE
Qty: 0 | Refills: 0 | Status: COMPLETED | OUTPATIENT
Start: 2017-07-10 | End: 2017-07-10

## 2017-07-10 RX ORDER — SODIUM,POTASSIUM PHOSPHATES 278-250MG
1 POWDER IN PACKET (EA) ORAL ONCE
Qty: 0 | Refills: 0 | Status: DISCONTINUED | OUTPATIENT
Start: 2017-07-10 | End: 2017-07-10

## 2017-07-10 RX ADMIN — HEPARIN SODIUM 5000 UNIT(S): 5000 INJECTION INTRAVENOUS; SUBCUTANEOUS at 06:04

## 2017-07-10 RX ADMIN — Medication 100 MILLIGRAM(S): at 21:12

## 2017-07-10 RX ADMIN — Medication 100 MILLIGRAM(S): at 13:17

## 2017-07-10 RX ADMIN — Medication 1 TABLET(S): at 17:57

## 2017-07-10 RX ADMIN — SODIUM CHLORIDE 75 MILLILITER(S): 9 INJECTION INTRAMUSCULAR; INTRAVENOUS; SUBCUTANEOUS at 06:04

## 2017-07-10 RX ADMIN — AMLODIPINE BESYLATE 5 MILLIGRAM(S): 2.5 TABLET ORAL at 06:03

## 2017-07-10 RX ADMIN — Medication 40 MILLIEQUIVALENT(S): at 11:27

## 2017-07-10 RX ADMIN — Medication 1 TABLET(S): at 13:17

## 2017-07-10 RX ADMIN — Medication 50 MICROGRAM(S): at 06:03

## 2017-07-10 RX ADMIN — TAMSULOSIN HYDROCHLORIDE 0.4 MILLIGRAM(S): 0.4 CAPSULE ORAL at 21:12

## 2017-07-10 RX ADMIN — Medication 1000 UNIT(S): at 11:29

## 2017-07-10 RX ADMIN — FINASTERIDE 5 MILLIGRAM(S): 5 TABLET, FILM COATED ORAL at 11:29

## 2017-07-10 RX ADMIN — DONEPEZIL HYDROCHLORIDE 10 MILLIGRAM(S): 10 TABLET, FILM COATED ORAL at 21:13

## 2017-07-10 RX ADMIN — CARVEDILOL PHOSPHATE 6.25 MILLIGRAM(S): 80 CAPSULE, EXTENDED RELEASE ORAL at 06:03

## 2017-07-10 RX ADMIN — Medication 1 TABLET(S): at 23:48

## 2017-07-10 RX ADMIN — Medication 100 MILLIGRAM(S): at 06:03

## 2017-07-10 RX ADMIN — HEPARIN SODIUM 5000 UNIT(S): 5000 INJECTION INTRAVENOUS; SUBCUTANEOUS at 17:58

## 2017-07-10 NOTE — PROGRESS NOTE ADULT - PROBLEM SELECTOR PLAN 8
patient started chemotherapy on 7/6/2017  appreciate heme onc recs  had surgical resection in 04/2017  outpt f/up

## 2017-07-10 NOTE — CONSULT NOTE ADULT - PROBLEM SELECTOR RECOMMENDATION 9
at this point leukocytosis does not appear to be due to any acute infectious process that would require antibiotics.  Patient did recently have chemo but is not neutropenic.  Suspect acute event due to failure to eat or drink and then patient standing, sitting, kneeling during mass triggering a vasovagal episode.  Will d/c abx and if patient does well and there is not revealing new data patient likely can be discharged tomorrow off abx.

## 2017-07-10 NOTE — CONSULT NOTE ADULT - PROBLEM SELECTOR RECOMMENDATION 2
no acute issues other than that chemo may have contributed to episode.    Thank you for consulting us and involving us in the management of this most interesting and challenging case.     We will follow along in the care of this patient.

## 2017-07-10 NOTE — SWALLOW BEDSIDE ASSESSMENT ADULT - SWALLOW EVAL: RECOMMENDED FEEDING/EATING TECHNIQUES
alternate food with liquid/maintain upright posture during/after eating for 30 mins/small sips/bites/position upright (90 degrees)/crush medication (when feasible)/hard swallow w/ each bite or sip

## 2017-07-10 NOTE — DIETITIAN INITIAL EVALUATION ADULT. - OTHER INFO
Pt admitted with syncope who had just started on chemotherapy a few weeks pta.  Pt tolerating meals with good po intake.  Reviewed SLP report completed this am with recommendation for Soft, thin liquids, cut-up.  Food prefs recorded.  Pt agreeable to start a nutritional supplement.

## 2017-07-10 NOTE — CONSULT NOTE ADULT - SUBJECTIVE AND OBJECTIVE BOX
HPI:  89 y/o M with PMHx of pancreatic CA (started on chemo 4 days ago), bladder CA, HTN, BPH, dementia, hypothyroidism presents with episode of near syncope after standing in [employer] waiting for communion after not eating or drinking anything that morning. Patient is fully independent. Patient has had mild cough since the morning of admission, states that he feels as though his throat is dry. Patient has just started chemotherapy on  (Gemzar), with no apparent side effects aside from staying awake. Patient's family states that he had a recent PET scan within the last month which was negative. Patient does not remember the circumstances of his episode. However, family states that his hands started shaking and he had a blank stare and started drooling. Admitted to some lightheadedness. Admits to constipation for the past 2 days. Denies fevers, chills, CP, SOB, n/v/d.    In the ED, patient's labs were significant for WBC 17.6, mild hyponatremia with Na of 133, LIT with BUN/Cr of 37/1.50, procalc of 0.07. CXR showed no gross consolidation. CT Chest showed 4 mm nodule, subpleural, right upper lobe. Ill-defined groundglass nodule measuring 6 mm right lower lobe best appreciated on axial imaging, series 2 image 99.  Linear scarring, stranding, left lower lobe. No vascular congestion. No effusion. No lobar consolidation. No pneumothorax. No pericardial effusion. No thoracic aortic aneurysm. Calcified coronary arteries evident. Hiatus hernia present.Central airway intact. Thyroid gland not enlarged. No mediastinal or hilar lesions evident, evaluation limited due to lack of IV contrast. Bilateral renal cysts present. The spleen is not identified, correlate clinically. Hypodensity right hepatic lobe likely a cyst too small to   characterize. No acute osseous abnormalities. 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  H/O elbow surgery: As a child  H/O colonoscopy  H/O cystoscopy: Bladder Cancer   H/O hernia repair: Inguinal Hernia surgery many years ago      Antimicrobials  cefTRIAXone   IVPB 1 Gram(s) IV Intermittent every 24 hours  azithromycin  IVPB 500 milliGRAM(s) IV Intermittent every 24 hours      Immunological      Other  tamsulosin 0.4 milliGRAM(s) Oral at bedtime  carvedilol 6.25 milliGRAM(s) Oral every 12 hours  amLODIPine   Tablet 5 milliGRAM(s) Oral daily  donepezil 10 milliGRAM(s) Oral at bedtime  levothyroxine 50 MICROGram(s) Oral daily  cholecalciferol 1000 Unit(s) Oral daily  finasteride 5 milliGRAM(s) Oral daily  sodium chloride 0.9%. 1000 milliLiter(s) IV Continuous <Continuous>  acetaminophen   Tablet 650 milliGRAM(s) Oral every 6 hours PRN  senna 2 Tablet(s) Oral at bedtime PRN  docusate sodium 100 milliGRAM(s) Oral three times a day  heparin  Injectable 5000 Unit(s) SubCutaneous every 12 hours  potassium phosphate IVPB 15 milliMole(s) IV Intermittent once  potassium acid phosphate/sodium acid phosphate tablet (K-PHOS No. 2) 1 Tablet(s) Oral once      Allergies    No Known Allergies    Intolerances        SOCIAL HISTORY: no current tobacco    FAMILY HISTORY:  Family history of breast cancer (Sibling)      ROS:    EYES:  Negative  blurry vision or double vision  GASTROINTESTINAL:  Negative for nausea, vomiting, diarrhea  -otherwise negative except for subjective    Vital Signs Last 24 Hrs  T(C): 37.3 (10 Jul 2017 04:52), Max: 37.3 (10 Jul 2017 04:52)  T(F): 99.2 (10 Jul 2017 04:52), Max: 99.2 (10 Jul 2017 04:52)  HR: 71 (10 Jul 2017 04:52) (64 - 73)  BP: 115/55 (10 Jul 2017 04:52) (115/55 - 166/68)  BP(mean): --  RR: 16 (10 Jul 2017 04:52) (16 - 16)  SpO2: 95% (10 Jul 2017 04:52) (95% - 97%)    PE:  WDWN in no distress  HEENT:  NC, PERRL, sclerae anicteric, conjunctivae clear, EOMI.  Sinuses nontender, no nasal exudate.  No buccal or pharyngeal lesions, erythema or exudate  Neck:  Supple, no adenopathy  Lungs:  No accessory muscle use, bilaterally clear to auscultation  Cor:  RRR, S1, S2, no murmur appreciated  Abd:  Symmetric, normoactive BS.  Soft, nontender, no masses, guarding or rebound.  Liver and spleen not enlarged  Extrem:  No cyanosis or edema  Skin:  No rashes.  Neuro: grossly intact  Musc: moving all limbs freely, no focal deficits    LABS:                        11.8   14.8  )-----------( 198      ( 10 Jul 2017 07:35 )             37.2     07-10    138  |  105  |  32<H>  ----------------------------<  102<H>  3.1<L>   |  25  |  1.20    Ca    7.6<L>      10 Jul 2017 07:35  Phos  2.0     07-10  Mg     2.1     07-10    TPro  8.0  /  Alb  4.0  /  TBili  2.0<H>  /  DBili  x   /  AST  15  /  ALT  13  /  AlkPhos  64  07-09    Urinalysis Basic - ( 2017 12:25 )    Color: x / Appearance: Clear / S.005 / pH: x  Gluc: x / Ketone: Negative  / Bili: Negative / Urobili: Negative   Blood: x / Protein: 25 mg/dL / Nitrite: Negative   Leuk Esterase: Negative / RBC: x / WBC x   Sq Epi: x / Non Sq Epi: x / Bacteria: x        MICROBIOLOGY:      RADIOLOGY & ADDITIONAL STUDIES:    --

## 2017-07-10 NOTE — PROGRESS NOTE ADULT - ASSESSMENT
89yo M w/ PMH of pancreatic CA (s/p surgical resection in 04/2017, started on chemo with Gemzar on 07/06/17), bladder CA, HTN, BPH, dementia, hypothyroidism p/w brief syncopal episode a/w likely brief syncopal (orthostatic vs vasovagal) episode.

## 2017-07-10 NOTE — CONSULT NOTE ADULT - ASSESSMENT
89yo male with Stage IIB pancreatic tail cancer (T3N1M0) s/p resection on 4/27/17 by Dr. Miranda at [facility] due to direct extension into spleen and + LN, started on adjuvant therapy with Gemzar 1000mg/m2 day1, day8 on 21 day cycle; s/p cycle 1 day 1 on 7/6/17; presented with syncope and found to have hyponatremia and LIT; both improved with hydration  - noted leukocytosis - likely not infectious; agree to hold off antibiotics  - encouraged po intake and increased electrolyte monitoring; advised daughter to increase fluid intake with gatorade; also encouraged nutritional supplements with Ensure as per dietician  - no oncologic contraindication to discharge  - patient to follow up with primary oncologist Dr. Nasrin Awad on 7/13/17 as scheduled.  - no acute heme/onc intervention necessary at this time

## 2017-07-10 NOTE — PROGRESS NOTE ADULT - PROBLEM SELECTOR PLAN 5
patient had multiple cancers with no pulmonary involvement to family's knowledge  had recent PET scan that family states was negative  -will have outpt f/up with oncologist of the pulmonary nodules seen on our CT Chest.

## 2017-07-10 NOTE — PROGRESS NOTE ADULT - PROBLEM SELECTOR PLAN 3
likely pre-renal azotemia  rehydrated and LIT improving with IVF  holding ACE-I and diuretic from home meds  monitor BMP

## 2017-07-10 NOTE — PROGRESS NOTE ADULT - SUBJECTIVE AND OBJECTIVE BOX
Patient is a 89y old  Male who presents with a chief complaint of passed out [employer] (2017 18:52)      INTERVAL HPI/OVERNIGHT EVENTS: Pt states he feels well. Denies dizziness, fever, chills, cough, SOB, CP, palpitations, dysuria, diarrhea, abd pain.     MEDICATIONS  (STANDING):  tamsulosin 0.4 milliGRAM(s) Oral at bedtime  carvedilol 6.25 milliGRAM(s) Oral every 12 hours  amLODIPine   Tablet 5 milliGRAM(s) Oral daily  donepezil 10 milliGRAM(s) Oral at bedtime  levothyroxine 50 MICROGram(s) Oral daily  cholecalciferol 1000 Unit(s) Oral daily  finasteride 5 milliGRAM(s) Oral daily  docusate sodium 100 milliGRAM(s) Oral three times a day  heparin  Injectable 5000 Unit(s) SubCutaneous every 12 hours    MEDICATIONS  (PRN):  acetaminophen   Tablet 650 milliGRAM(s) Oral every 6 hours PRN For Temp greater than 38 C (100.4 F)  senna 2 Tablet(s) Oral at bedtime PRN Constipation      Allergies    No Known Allergies    Intolerances        REVIEW OF SYSTEMS:  CONSTITUTIONAL: No fever or chills  HEENT:  No headache, no sore throat  RESPIRATORY: No cough, wheezing, or shortness of breath  CARDIOVASCULAR: No chest pain, palpitations, or leg swelling  GASTROINTESTINAL: +constipation; No nausea, vomiting, or diarrhea  GENITOURINARY: No dysuria, frequency, or hematuria  NEUROLOGICAL: no focal weakness or dizziness  SKIN:  No rashes or lesions   MUSCULOSKELETAL: no myalgias   PSYCHIATRIC: No depression or anxiety  ROS Unable to obtain due to - [ ] Dementia  [ ] Lethargy  REST OF REVIEW Of SYSTEM - [ ] Normal     Vital Signs Last 24 Hrs  T(C): 36.9 (10 Jul 2017 20:30), Max: 37.3 (10 Jul 2017 04:52)  T(F): 98.5 (10 Jul 2017 20:30), Max: 99.2 (10 Jul 2017 04:52)  HR: 62 (10 Jul 2017 20:30) (59 - 71)  BP: 164/63 (10 Jul 2017 20:30) (115/55 - 164/63)  BP(mean): --  RR: 17 (10 Jul 2017 20:30) (16 - 17)  SpO2: 97% (10 Jul 2017 20:30) (95% - 100%)    PHYSICAL EXAM:  GENERAL: NAD  HEENT:  EOMI, moist mucous membranes  CHEST/LUNG:  CTA b/l, no rales, wheezes, or rhonchi  HEART:  RRR, S1, S2  ABDOMEN:  BS+, soft, nontender, nondistended; well-healed small incisions on abdomen   EXTREMITIES: no edema or calf tenderness  SKIN:  no rash  NERVOUS SYSTEM: AA&Ox3    LABS:                        11.8   14.8  )-----------( 198      ( 10 Jul 2017 07:35 )             37.2     CBC Full  -  ( 10 Jul 2017 07:35 )  WBC Count : 14.8 K/uL  Hemoglobin : 11.8 g/dL  Hematocrit : 37.2 %  Platelet Count - Automated : 198 K/uL  Mean Cell Volume : 86.3 fl  Mean Cell Hemoglobin : 27.3 pg  Mean Cell Hemoglobin Concentration : 31.6 gm/dL  Auto Neutrophil # : 12.4 K/uL  Auto Lymphocyte # : 2.0 K/uL  Auto Monocyte # : 0.2 K/uL  Auto Eosinophil # : 0.1 K/uL  Auto Basophil # : 0.1 K/uL  Auto Neutrophil % : 84.2 %  Auto Lymphocyte % : 13.9 %  Auto Monocyte % : 1.2 %  Auto Eosinophil % : 0.3 %  Auto Basophil % : 0.4 %    10 Jul 2017 07:35    138    |  105    |  32     ----------------------------<  102    3.1     |  25     |  1.20     Ca    7.6        10 Jul 2017 07:35  Phos  2.0       10 Jul 2017 07:35  Mg     2.1       10 Jul 2017 07:35      PT/INR - ( 2017 10:31 )   PT: 10.6 sec;   INR: 0.97 ratio         PTT - ( 2017 10:31 )  PTT:24.8 sec  Urinalysis Basic - ( 2017 12:25 )    Color: x / Appearance: Clear / S.005 / pH: x  Gluc: x / Ketone: Negative  / Bili: Negative / Urobili: Negative   Blood: x / Protein: 25 mg/dL / Nitrite: Negative   Leuk Esterase: Negative / RBC: x / WBC x   Sq Epi: x / Non Sq Epi: x / Bacteria: x      CAPILLARY BLOOD GLUCOSE          RECENT CULTURES:        RESPIRATORY CULTURES:      cardiac Enzyme:        Anemia panel:          RADIOLOGY & ADDITIONAL TESTS:  CT Chest 17: 4 mm nodule, subpleural, right upper lobe. Ill-defined groundglass nodule measuring 6 mm right lower lobe best appreciated on axial imaging, series 2 image 99.  Linear scarring, stranding, left lower lobe. No vascular congestion. No effusion. No lobar consolidation. No pneumothorax.  No pericardial effusion. No thoracic aortic aneurysm.  Personally reviewed.     Consultant(s) Notes Reviewed:  [x] YES  [ ] NO    Care Discussed with [x] Consultants  [x] Patient  [x] Family  [ ]      [ ] Other; RN  DVT ppx: BARBER Patient is a 89y old  Male who presents with a chief complaint of passed out [facility] (2017 18:52)      INTERVAL HPI/OVERNIGHT EVENTS: Pt states he feels well. Denies dizziness, fever, chills, cough, SOB, CP, palpitations, dysuria, diarrhea, abd pain.     MEDICATIONS  (STANDING):  tamsulosin 0.4 milliGRAM(s) Oral at bedtime  carvedilol 6.25 milliGRAM(s) Oral every 12 hours  amLODIPine   Tablet 5 milliGRAM(s) Oral daily  donepezil 10 milliGRAM(s) Oral at bedtime  levothyroxine 50 MICROGram(s) Oral daily  cholecalciferol 1000 Unit(s) Oral daily  finasteride 5 milliGRAM(s) Oral daily  docusate sodium 100 milliGRAM(s) Oral three times a day  heparin  Injectable 5000 Unit(s) SubCutaneous every 12 hours    MEDICATIONS  (PRN):  acetaminophen   Tablet 650 milliGRAM(s) Oral every 6 hours PRN For Temp greater than 38 C (100.4 F)  senna 2 Tablet(s) Oral at bedtime PRN Constipation      Allergies    No Known Allergies    Intolerances      REVIEW OF SYSTEMS:  CONSTITUTIONAL: No fever or chills  HEENT:  No headache, no sore throat  RESPIRATORY: No cough, wheezing, or shortness of breath  CARDIOVASCULAR: No chest pain, palpitations, or leg swelling  GASTROINTESTINAL: +constipation; No nausea, vomiting, or diarrhea  GENITOURINARY: No dysuria, frequency, or hematuria  NEUROLOGICAL: no focal weakness or dizziness  SKIN:  No rashes or lesions   MUSCULOSKELETAL: no myalgias   PSYCHIATRIC: No depression or anxiety  ROS Unable to obtain due to - [ ] Dementia  [ ] Lethargy  REST OF REVIEW Of SYSTEM - [ ] Normal     Vital Signs Last 24 Hrs  T(C): 36.9 (10 Jul 2017 20:30), Max: 37.3 (10 Jul 2017 04:52)  T(F): 98.5 (10 Jul 2017 20:30), Max: 99.2 (10 Jul 2017 04:52)  HR: 62 (10 Jul 2017 20:30) (59 - 71)  BP: 164/63 (10 Jul 2017 20:30) (115/55 - 164/63)  BP(mean): --  RR: 17 (10 Jul 2017 20:30) (16 - 17)  SpO2: 97% (10 Jul 2017 20:30) (95% - 100%)    PHYSICAL EXAM:  GENERAL: NAD  HEENT:  EOMI, moist mucous membranes  CHEST/LUNG:  CTA b/l, no rales, wheezes, or rhonchi  HEART:  RRR, S1, S2  ABDOMEN:  BS+, soft, nontender, nondistended; well-healed small incisions on abdomen   EXTREMITIES: no edema or calf tenderness  SKIN:  no rash  NERVOUS SYSTEM: AA&Ox3    LABS:                        11.8   14.8  )-----------( 198      ( 10 Jul 2017 07:35 )             37.2     CBC Full  -  ( 10 Jul 2017 07:35 )  WBC Count : 14.8 K/uL  Hemoglobin : 11.8 g/dL  Hematocrit : 37.2 %  Platelet Count - Automated : 198 K/uL  Mean Cell Volume : 86.3 fl  Mean Cell Hemoglobin : 27.3 pg  Mean Cell Hemoglobin Concentration : 31.6 gm/dL  Auto Neutrophil # : 12.4 K/uL  Auto Lymphocyte # : 2.0 K/uL  Auto Monocyte # : 0.2 K/uL  Auto Eosinophil # : 0.1 K/uL  Auto Basophil # : 0.1 K/uL  Auto Neutrophil % : 84.2 %  Auto Lymphocyte % : 13.9 %  Auto Monocyte % : 1.2 %  Auto Eosinophil % : 0.3 %  Auto Basophil % : 0.4 %    10 Jul 2017 07:35    138    |  105    |  32     ----------------------------<  102    3.1     |  25     |  1.20     Ca    7.6        10 Jul 2017 07:35  Phos  2.0       10 Jul 2017 07:35  Mg     2.1       10 Jul 2017 07:35      PT/INR - ( 2017 10:31 )   PT: 10.6 sec;   INR: 0.97 ratio         PTT - ( 2017 10:31 )  PTT:24.8 sec  Urinalysis Basic - ( 2017 12:25 )    Color: x / Appearance: Clear / S.005 / pH: x  Gluc: x / Ketone: Negative  / Bili: Negative / Urobili: Negative   Blood: x / Protein: 25 mg/dL / Nitrite: Negative   Leuk Esterase: Negative / RBC: x / WBC x   Sq Epi: x / Non Sq Epi: x / Bacteria: x      CAPILLARY BLOOD GLUCOSE          RECENT CULTURES:        RESPIRATORY CULTURES:      cardiac Enzyme:        Anemia panel:          RADIOLOGY & ADDITIONAL TESTS:  CT Chest 17: 4 mm nodule, subpleural, right upper lobe. Ill-defined groundglass nodule measuring 6 mm right lower lobe best appreciated on axial imaging, series 2 image 99.  Linear scarring, stranding, left lower lobe. No vascular congestion. No effusion. No lobar consolidation. No pneumothorax.  No pericardial effusion. No thoracic aortic aneurysm.  Personally reviewed.     Consultant(s) Notes Reviewed:  [x] YES  [ ] NO    Care Discussed with [x] Consultants  [x] Patient  [x] Family  [ ]      [ ] Other; RN  DVT ppx: BARBER

## 2017-07-10 NOTE — CONSULT NOTE ADULT - SUBJECTIVE AND OBJECTIVE BOX
Patient is a 89y old  Male who presents with a chief complaint of passed out [facility] (09 Jul 2017 18:52)      HPI:  89 y/o M with diagnosis of resected pancreatic cancer initially was found to have abdominal pain and weight loss in August 2016. CT of the abdomen and follow-up MRI confirmed presence of pancreatic mass; Staging PET scan per family was negative for metastatic disease. Ultimately evaluated by surgical oncology (Dr. Jose Miranda) and underwent resection of the mass at the pancreatic tail on 4/24/17.  Final pathology showed T3N1 disease and he was evaluated by medical oncology (Dr. Luis Awad [phone]). He received cycle 1 day 1 of adjuvant therapy with Gemzar 1000 mg/m2 on 7/6/17 and was drinking increased amounts of free water as recommended. He presented to [facility] with syncopal episode that occurred early in the morning on 7/9/17.    PMHx  is also significant for bladder CA in 2013, HTN, BPH, dementia, hypothyroidism. Daughter is at bedside.  In ER bloodwork showed elevated WBC, hyponatremia with Na 133 and LIT (BUN 37; Creat 1.5); symptoms improved with hydration. CT chest showed ill-defined groundglass nodule measuring 6 mm right lower lobe and non-specific 4mm subpleural RUL nodule  He was started empirically on antibiotics - which were discontinued by ID as he has no clear signs of infection. Asked now to evaluate.       ROS:  Negative except for: lightheadedness, weight loss.    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  H/O elbow surgery: As a child  H/O colonoscopy  H/O cystoscopy: Bladder Cancer 2013  H/O hernia repair: Inguinal Hernia surgery many years ago      SOCIAL HISTORY:  , lives alone but daughter within 10 minutes away  former smoker - 1ppd X20 years; quit > 30 years agio  Social EtOH - 1 drink per week  Denies illicit drug use  Retired    FAMILY HISTORY:  Family history of breast cancer (Sibling)      MEDICATIONS  (STANDING):  tamsulosin 0.4 milliGRAM(s) Oral at bedtime  carvedilol 6.25 milliGRAM(s) Oral every 12 hours  amLODIPine   Tablet 5 milliGRAM(s) Oral daily  donepezil 10 milliGRAM(s) Oral at bedtime  levothyroxine 50 MICROGram(s) Oral daily  cholecalciferol 1000 Unit(s) Oral daily  finasteride 5 milliGRAM(s) Oral daily  docusate sodium 100 milliGRAM(s) Oral three times a day  heparin  Injectable 5000 Unit(s) SubCutaneous every 12 hours  potassium acid phosphate/sodium acid phosphate tablet (K-PHOS No. 2) 1 Tablet(s) Oral every 6 hours    MEDICATIONS  (PRN):  acetaminophen   Tablet 650 milliGRAM(s) Oral every 6 hours PRN For Temp greater than 38 C (100.4 F)  senna 2 Tablet(s) Oral at bedtime PRN Constipation    Allergies  No Known Allergies    Vital Signs Last 24 Hrs  T(C): 37.3 (10 Jul 2017 04:52), Max: 37.3 (10 Jul 2017 04:52)  T(F): 99.2 (10 Jul 2017 04:52), Max: 99.2 (10 Jul 2017 04:52)  HR: 71 (10 Jul 2017 04:52) (64 - 71)  BP: 115/55 (10 Jul 2017 04:52) (115/55 - 166/68)  BP(mean): --  RR: 16 (10 Jul 2017 04:52) (16 - 16)  SpO2: 95% (10 Jul 2017 04:52) (95% - 97%)    PHYSICAL EXAM  General: adult in NAD  HEENT: clear oropharynx, anicteric sclera, pink conjunctivae; + hard of hearing  Neck: supple  CV: normal S1S2 with no murmur rubs or gallops  Lungs: clear to auscultation, no wheezes, no rhales  Abdomen: soft non-tender non-distended, no hepato/splenomegaly; well healed trochar scars from surgery in April  Ext: no clubbing cyanosis or edema  Skin: no rashes and no petichiae  Neuro: alert and oriented X3 no focal deficits      LABS:    CBC Full  -  ( 10 Jul 2017 07:35 )  WBC Count : 14.8 K/uL  Hemoglobin : 11.8 g/dL  Hematocrit : 37.2 %  Platelet Count - Automated : 198 K/uL  Mean Cell Volume : 86.3 fl  Mean Cell Hemoglobin : 27.3 pg  Mean Cell Hemoglobin Concentration : 31.6 gm/dL  Auto Neutrophil # : 12.4 K/uL  Auto Lymphocyte # : 2.0 K/uL  Auto Monocyte # : 0.2 K/uL  Auto Eosinophil # : 0.1 K/uL  Auto Basophil # : 0.1 K/uL  Auto Neutrophil % : 84.2 %  Auto Lymphocyte % : 13.9 %  Auto Monocyte % : 1.2 %  Auto Eosinophil % : 0.3 %  Auto Basophil % : 0.4 %    07-10    138  |  105  |  32<H>  ----------------------------<  102<H>  3.1<L>   |  25  |  1.20    Ca    7.6<L>      10 Jul 2017 07:35  Phos  2.0     07-10  Mg     2.1     07-10    TPro  8.0  /  Alb  4.0  /  TBili  2.0<H>  /  DBili  x   /  AST  15  /  ALT  13  /  AlkPhos  64  07-09    PT/INR - ( 09 Jul 2017 10:31 )   PT: 10.6 sec;   INR: 0.97 ratio     PTT - ( 09 Jul 2017 10:31 )  PTT:24.8 sec    BLOOD SMEAR INTERPRETATION:    * RBC - normocytic, normochromic, no anisiocytosis or poikiolocytosis  * WBC - increased neutrophils with normal morphology, no evidence of left shift  * Platelets - normal in number ; + giant platelets noted    RADIOLOGY :   CT Chest No Cont (07.09.17 @ 14:12)   4 mm nodule, subpleural, right upper lobe. Ill-defined groundglass nodule measuring 6 mm right lower lobe best appreciated on axial imaging, series 2 image 99.  Linear scarring, stranding, left lower lobe. No vascular congestion. No effusion. No lobar consolidation. No pneumothorax.      PATHOLOGY  4/27/17  Distal pancreas and spleen, in toto resection:  A: Infiltrating ductal adenocarcinoma of pancreas, grade 2 with perineural invasion and direct extension to the attachedspleen, measuring 7 x 5.2 x 3.4 cm in greatest dimension.Margins clear.  B: Lymph node with metastatic adenocarcinoma to one oftwo lymph nodes.  C: Adherent spleen with direct tumor extension and vascular congestion.

## 2017-07-10 NOTE — PROGRESS NOTE ADULT - PROBLEM SELECTOR PLAN 4
likely hypovolemic hyponatremia on admission that is now improved with IVF  -has hypokalemia and hypophosphatemia which were repleted today; f/up electrolytes.

## 2017-07-10 NOTE — GOALS OF CARE CONVERSATION - PERSONAL ADVANCE DIRECTIVE - CONVERSATION DETAILS
offered assistance to complete HCP . pt refused staing his daughters will take care of him. I did educate pt regarding conversatiopns to have with his family regarding his wishes

## 2017-07-10 NOTE — SWALLOW BEDSIDE ASSESSMENT ADULT - COMMENTS
89 y/o M with PMHx of pancreatic CA (started on chemo 4 days ago), bladder CA, HTN, BPH, dementia, hypothyroidism presents with episode of near syncope after standing in Religious.   Pt awake, alert communicates wants/needs, questionably Tribe ?  confusion at times  Pt c no overt si/sx of aspiration

## 2017-07-11 ENCOUNTER — TRANSCRIPTION ENCOUNTER (OUTPATIENT)
Age: 82
End: 2017-07-11

## 2017-07-11 VITALS
SYSTOLIC BLOOD PRESSURE: 179 MMHG | RESPIRATION RATE: 18 BRPM | DIASTOLIC BLOOD PRESSURE: 68 MMHG | TEMPERATURE: 98 F | OXYGEN SATURATION: 100 % | HEART RATE: 68 BPM

## 2017-07-11 DIAGNOSIS — D72.829 ELEVATED WHITE BLOOD CELL COUNT, UNSPECIFIED: ICD-10-CM

## 2017-07-11 DIAGNOSIS — R55 SYNCOPE AND COLLAPSE: ICD-10-CM

## 2017-07-11 DIAGNOSIS — E87.8 OTHER DISORDERS OF ELECTROLYTE AND FLUID BALANCE, NOT ELSEWHERE CLASSIFIED: ICD-10-CM

## 2017-07-11 LAB
ANION GAP SERPL CALC-SCNC: 4 MMOL/L — LOW (ref 5–17)
BUN SERPL-MCNC: 20 MG/DL — SIGNIFICANT CHANGE UP (ref 7–23)
CALCIUM SERPL-MCNC: 8 MG/DL — LOW (ref 8.5–10.1)
CHLORIDE SERPL-SCNC: 101 MMOL/L — SIGNIFICANT CHANGE UP (ref 96–108)
CHLORIDE UR-SCNC: 120 MMOL/L — SIGNIFICANT CHANGE UP
CO2 SERPL-SCNC: 31 MMOL/L — SIGNIFICANT CHANGE UP (ref 22–31)
CREAT ?TM UR-MCNC: 24 MG/DL — SIGNIFICANT CHANGE UP
CREAT SERPL-MCNC: 1.1 MG/DL — SIGNIFICANT CHANGE UP (ref 0.5–1.3)
GLUCOSE SERPL-MCNC: 136 MG/DL — HIGH (ref 70–99)
HCT VFR BLD CALC: 38.2 % — LOW (ref 39–50)
HGB BLD-MCNC: 12.2 G/DL — LOW (ref 13–17)
MAGNESIUM SERPL-MCNC: 2 MG/DL — SIGNIFICANT CHANGE UP (ref 1.6–2.6)
MCHC RBC-ENTMCNC: 27.6 PG — SIGNIFICANT CHANGE UP (ref 27–34)
MCHC RBC-ENTMCNC: 31.9 GM/DL — LOW (ref 32–36)
MCV RBC AUTO: 86.6 FL — SIGNIFICANT CHANGE UP (ref 80–100)
OSMOLALITY UR: 377 MOS/KG — SIGNIFICANT CHANGE UP (ref 50–1200)
PHOSPHATE SERPL-MCNC: 1.9 MG/DL — LOW (ref 2.5–4.5)
PLATELET # BLD AUTO: 240 K/UL — SIGNIFICANT CHANGE UP (ref 150–400)
POTASSIUM SERPL-MCNC: 3.4 MMOL/L — LOW (ref 3.5–5.3)
POTASSIUM SERPL-SCNC: 3.4 MMOL/L — LOW (ref 3.5–5.3)
RBC # BLD: 4.41 M/UL — SIGNIFICANT CHANGE UP (ref 4.2–5.8)
RBC # FLD: 14.1 % — SIGNIFICANT CHANGE UP (ref 10.3–14.5)
SODIUM SERPL-SCNC: 136 MMOL/L — SIGNIFICANT CHANGE UP (ref 135–145)
SODIUM UR-SCNC: 125 MMOL/L — SIGNIFICANT CHANGE UP
WBC # BLD: 10.2 K/UL — SIGNIFICANT CHANGE UP (ref 3.8–10.5)
WBC # FLD AUTO: 10.2 K/UL — SIGNIFICANT CHANGE UP (ref 3.8–10.5)

## 2017-07-11 PROCEDURE — 85027 COMPLETE CBC AUTOMATED: CPT

## 2017-07-11 PROCEDURE — 83930 ASSAY OF BLOOD OSMOLALITY: CPT

## 2017-07-11 PROCEDURE — 84100 ASSAY OF PHOSPHORUS: CPT

## 2017-07-11 PROCEDURE — 82570 ASSAY OF URINE CREATININE: CPT

## 2017-07-11 PROCEDURE — 87086 URINE CULTURE/COLONY COUNT: CPT

## 2017-07-11 PROCEDURE — 85730 THROMBOPLASTIN TIME PARTIAL: CPT

## 2017-07-11 PROCEDURE — 81001 URINALYSIS AUTO W/SCOPE: CPT

## 2017-07-11 PROCEDURE — 96365 THER/PROPH/DIAG IV INF INIT: CPT

## 2017-07-11 PROCEDURE — 83935 ASSAY OF URINE OSMOLALITY: CPT

## 2017-07-11 PROCEDURE — 80053 COMPREHEN METABOLIC PANEL: CPT

## 2017-07-11 PROCEDURE — 71250 CT THORAX DX C-: CPT

## 2017-07-11 PROCEDURE — 87040 BLOOD CULTURE FOR BACTERIA: CPT

## 2017-07-11 PROCEDURE — 80048 BASIC METABOLIC PNL TOTAL CA: CPT

## 2017-07-11 PROCEDURE — 85610 PROTHROMBIN TIME: CPT

## 2017-07-11 PROCEDURE — 93005 ELECTROCARDIOGRAM TRACING: CPT

## 2017-07-11 PROCEDURE — 84145 PROCALCITONIN (PCT): CPT

## 2017-07-11 PROCEDURE — 99239 HOSP IP/OBS DSCHRG MGMT >30: CPT

## 2017-07-11 PROCEDURE — 84300 ASSAY OF URINE SODIUM: CPT

## 2017-07-11 PROCEDURE — 83605 ASSAY OF LACTIC ACID: CPT

## 2017-07-11 PROCEDURE — 86140 C-REACTIVE PROTEIN: CPT

## 2017-07-11 PROCEDURE — 82436 ASSAY OF URINE CHLORIDE: CPT

## 2017-07-11 PROCEDURE — 71045 X-RAY EXAM CHEST 1 VIEW: CPT

## 2017-07-11 PROCEDURE — 96367 TX/PROPH/DG ADDL SEQ IV INF: CPT

## 2017-07-11 PROCEDURE — 99285 EMERGENCY DEPT VISIT HI MDM: CPT | Mod: 25

## 2017-07-11 PROCEDURE — 83735 ASSAY OF MAGNESIUM: CPT

## 2017-07-11 RX ORDER — POTASSIUM CHLORIDE 20 MEQ
40 PACKET (EA) ORAL ONCE
Qty: 0 | Refills: 0 | Status: COMPLETED | OUTPATIENT
Start: 2017-07-11 | End: 2017-07-11

## 2017-07-11 RX ORDER — DOCUSATE SODIUM 100 MG
1 CAPSULE ORAL
Qty: 0 | Refills: 0 | COMMUNITY
Start: 2017-07-11

## 2017-07-11 RX ORDER — SODIUM,POTASSIUM PHOSPHATES 278-250MG
1 POWDER IN PACKET (EA) ORAL
Qty: 0 | Refills: 0 | COMMUNITY
Start: 2017-07-11

## 2017-07-11 RX ORDER — GABAPENTIN 400 MG/1
300 CAPSULE ORAL
Qty: 0 | Refills: 0 | Status: DISCONTINUED | OUTPATIENT
Start: 2017-07-11 | End: 2017-07-11

## 2017-07-11 RX ORDER — SODIUM,POTASSIUM PHOSPHATES 278-250MG
1 POWDER IN PACKET (EA) ORAL EVERY 6 HOURS
Qty: 0 | Refills: 0 | Status: DISCONTINUED | OUTPATIENT
Start: 2017-07-11 | End: 2017-07-11

## 2017-07-11 RX ADMIN — GABAPENTIN 300 MILLIGRAM(S): 400 CAPSULE ORAL at 11:06

## 2017-07-11 RX ADMIN — Medication 50 MICROGRAM(S): at 05:45

## 2017-07-11 RX ADMIN — Medication 40 MILLIEQUIVALENT(S): at 11:47

## 2017-07-11 RX ADMIN — FINASTERIDE 5 MILLIGRAM(S): 5 TABLET, FILM COATED ORAL at 11:07

## 2017-07-11 RX ADMIN — AMLODIPINE BESYLATE 5 MILLIGRAM(S): 2.5 TABLET ORAL at 05:45

## 2017-07-11 RX ADMIN — Medication 1000 UNIT(S): at 11:06

## 2017-07-11 RX ADMIN — CARVEDILOL PHOSPHATE 6.25 MILLIGRAM(S): 80 CAPSULE, EXTENDED RELEASE ORAL at 05:45

## 2017-07-11 RX ADMIN — Medication 1 TABLET(S): at 11:52

## 2017-07-11 RX ADMIN — HEPARIN SODIUM 5000 UNIT(S): 5000 INJECTION INTRAVENOUS; SUBCUTANEOUS at 05:45

## 2017-07-11 RX ADMIN — Medication 100 MILLIGRAM(S): at 05:45

## 2017-07-11 NOTE — PROGRESS NOTE ADULT - PROBLEM SELECTOR PLAN 2
-likely in part due to hemoconcentration and in part due to malignancy  -procalcitonin negative, CT Chest with no signs of PNA, no cough, no fever, ROS with no sign of infection; agree with ID to stop antibiotics and monitor  -if no sign of infection presents itself overnight, will likely discharge pt off antibiotics
as per oncology.    Thank you for consulting us and involving us in the management of this most interesting and challenging case.     Please Call with any further questions

## 2017-07-11 NOTE — DISCHARGE NOTE ADULT - CARE PLAN
Principal Discharge DX:	Syncope  Goal:	prevention of recurrent episodes  Instructions for follow-up, activity and diet:	Your fall was likely from dehydration. Stay hydrated with water supplemented by an electrolyte containing fluid, such as, Gatorade. Stop taking the triamterene-hydrochlorothiazide medication you were on as that can cause you to lose electrolytes and water. Monitor your blood pressure. If the top number is getting under 100, call your doctor about your regimen of blood pressure medications. If you have another episode of passing out, come to the ER. Try to control your constipation with stool softeners and fiber, and do not strain too much to have bowel movements if possible. Follow up with your PMD within a week.  Secondary Diagnosis:	LIT (acute kidney injury)  Instructions for follow-up, activity and diet:	Your kidney function was decreased when you came to the hospital likely from dehydration. Stay hydrated with water supplemented by an electrolyte containing fluid, such as, Gatorade. Stop taking the triamterene-hydrochlorothiazide medication you were on as that can cause you to lose electrolytes and water. Monitor your blood pressure. If the top number is getting under 100, call your doctor about your regimen of blood pressure medications. Follow up with your PMD within a week.  Secondary Diagnosis:	Electrolyte abnormality  Instructions for follow-up, activity and diet:	Your sodium, potassium and phosphorus were a little low. The sodium normalized with fluids. The potassium is nearly normal. Take the prescribed potassium phosphate supplement twice daily for 3 days and recheck your BMP and Phosphorus levels in your PMD's office this Friday if possible.  Secondary Diagnosis:	Pulmonary nodule  Instructions for follow-up, activity and diet:	You have two very small lung nodules that were seen incidentally on a CT Chest you had in the hospital: 4 mm nodule, subpleural, right upper lobe. Ill-defined groundglass nodule measuring 6 mm right lower lobe best appreciated on axial imaging.   Follow up with your oncologist to have repeat imaging in 3 months or another PET scan as per your oncologist's discretion.  Secondary Diagnosis:	Pancreatic cancer  Instructions for follow-up, activity and diet:	Follow up with your oncologist regarding further treatment.  Secondary Diagnosis:	Hypertension  Instructions for follow-up, activity and diet:	Stop taking the triamterene-hydrochlorothiazide medication you were on as that can cause you to lose electrolytes and water. Monitor your blood pressure. If the top number is getting under 100, call your doctor about your regimen of blood pressure medications.  Secondary Diagnosis:	Hypothyroidism  Instructions for follow-up, activity and diet:	Continue your synthroid and follow up with your PMD.

## 2017-07-11 NOTE — DISCHARGE NOTE ADULT - SECONDARY DIAGNOSIS.
LIT (acute kidney injury) Electrolyte abnormality Pulmonary nodule Pancreatic cancer Hypertension Hypothyroidism

## 2017-07-11 NOTE — DISCHARGE NOTE ADULT - PLAN OF CARE
prevention of recurrent episodes Your fall was likely from dehydration. Stay hydrated with water supplemented by an electrolyte containing fluid, such as, Gatorade. Stop taking the triamterene-hydrochlorothiazide medication you were on as that can cause you to lose electrolytes and water. Monitor your blood pressure. If the top number is getting under 100, call your doctor about your regimen of blood pressure medications. If you have another episode of passing out, come to the ER. Try to control your constipation with stool softeners and fiber, and do not strain too much to have bowel movements if possible. Follow up with your PMD within a week. Your kidney function was decreased when you came to the hospital likely from dehydration. Stay hydrated with water supplemented by an electrolyte containing fluid, such as, Gatorade. Stop taking the triamterene-hydrochlorothiazide medication you were on as that can cause you to lose electrolytes and water. Monitor your blood pressure. If the top number is getting under 100, call your doctor about your regimen of blood pressure medications. Follow up with your PMD within a week. Your sodium, potassium and phosphorus were a little low. The sodium normalized with fluids. The potassium is nearly normal. Take the prescribed potassium phosphate supplement twice daily for 3 days and recheck your BMP and Phosphorus levels in your PMD's office this Friday if possible. You have two very small lung nodules that were seen incidentally on a CT Chest you had in the hospital: 4 mm nodule, subpleural, right upper lobe. Ill-defined groundglass nodule measuring 6 mm right lower lobe best appreciated on axial imaging.   Follow up with your oncologist to have repeat imaging in 3 months or another PET scan as per your oncologist's discretion. Follow up with your oncologist regarding further treatment. Stop taking the triamterene-hydrochlorothiazide medication you were on as that can cause you to lose electrolytes and water. Monitor your blood pressure. If the top number is getting under 100, call your doctor about your regimen of blood pressure medications. Continue your synthroid and follow up with your PMD.

## 2017-07-11 NOTE — DISCHARGE NOTE ADULT - PATIENT PORTAL LINK FT
“You can access the Follow[facility] Patient Portal, offered by [facility], by registering with the following website: [email or web address]”

## 2017-07-11 NOTE — PROGRESS NOTE ADULT - SUBJECTIVE AND OBJECTIVE BOX
infectious diseases progress note:    SALOME ARIAS is a 89y y. o. Male patient    Patient reports: no issues just wants to know "can I go now"    ROS:    EYES:  Negative  blurry vision or double vision  GASTROINTESTINAL:  Negative for nausea, vomiting, diarrhea  -otherwise negative except for subjective    Allergies    No Known Allergies    Intolerances        ANTIBIOTICS/RELEVANT:  antimicrobials    immunologic:    OTHER:  tamsulosin 0.4 milliGRAM(s) Oral at bedtime  carvedilol 6.25 milliGRAM(s) Oral every 12 hours  amLODIPine   Tablet 5 milliGRAM(s) Oral daily  donepezil 10 milliGRAM(s) Oral at bedtime  levothyroxine 50 MICROGram(s) Oral daily  cholecalciferol 1000 Unit(s) Oral daily  finasteride 5 milliGRAM(s) Oral daily  acetaminophen   Tablet 650 milliGRAM(s) Oral every 6 hours PRN  senna 2 Tablet(s) Oral at bedtime PRN  docusate sodium 100 milliGRAM(s) Oral three times a day  heparin  Injectable 5000 Unit(s) SubCutaneous every 12 hours  gabapentin 300 milliGRAM(s) Oral two times a day      Objective:  Vital Signs Last 24 Hrs  T(C): 36.8 (2017 05:20), Max: 36.9 (10 Jul 2017 20:30)  T(F): 98.3 (2017 05:20), Max: 98.5 (10 Jul 2017 20:30)  HR: 69 (2017 05:20) (59 - 69)  BP: 169/64 (2017 05:20) (157/68 - 169/64)  BP(mean): --  RR: 16 (2017 05:20) (16 - 17)  SpO2: 96% (2017 05:20) (96% - 100%)    T(C): 36.8 (2017 05:20), Max: 37.3 (10 Jul 2017 04:52)  T(C): 36.8 (2017 05:20), Max: 37.3 (10 Jul 2017 04:52)  T(C): 36.8 (2017 05:20), Max: 37.3 (10 Jul 2017 04:52)    PHYSICAL EXAM:  Constitutional:Well-developed, well nourished  Eyes:PERRLA, EOMI  Ear/Nose/Throat: oropharynx normal	  Neck:no JVD, no lymphadenopathy, supple  Respiratory: no accessory muscle use  Cardiovascular:RRR,   Gastrointestinal:soft, NT  Extremities:no clubbing, no cyanosis, edema absent      LABS:                        12.2   10.2  )-----------( 240      ( 2017 06:58 )             38.2       10.2 07-11 @ 06:58  14.8 07-10 @ 07:35  17.6 07-09 @ 10:31      07-11    136  |  101  |  20  ----------------------------<  136<H>  3.4<L>   |  31  |  1.10    Ca    8.0<L>      2017 06:58  Phos  1.9       Mg     2.0         TPro  8.0  /  Alb  4.0  /  TBili  2.0<H>  /  DBili  x   /  AST  15  /  ALT  13  /  AlkPhos  64  07-09    PT/INR - ( 2017 10:31 )   PT: 10.6 sec;   INR: 0.97 ratio         PTT - ( 2017 10:31 )  PTT:24.8 sec  Urinalysis Basic - ( 2017 12:25 )    Color: x / Appearance: Clear / S.005 / pH: x  Gluc: x / Ketone: Negative  / Bili: Negative / Urobili: Negative   Blood: x / Protein: 25 mg/dL / Nitrite: Negative   Leuk Esterase: Negative / RBC: x / WBC x   Sq Epi: x / Non Sq Epi: x / Bacteria: x          MICROBIOLOGY:    Culture - Urine (17 @ 19:30)    Specimen Source: .Urine Clean Catch (Midstream)    Culture Results:   No growth    Culture - Blood (17 @ 15:07)    Specimen Source: .Blood Blood-Peripheral    Culture Results:   No growth to date.          RADIOLOGY & ADDITIONAL STUDIES:

## 2017-07-11 NOTE — PROGRESS NOTE ADULT - ASSESSMENT
89 yo male who recently started on chemo admitted after near syncope while waiting for communion without eating or drinking and noted to have leukocytosis, dry cough but negative imaging, low procalcitonin

## 2017-07-11 NOTE — DISCHARGE NOTE ADULT - MEDICATION SUMMARY - MEDICATIONS TO STOP TAKING
I will STOP taking the medications listed below when I get home from the hospital:    hydrochlorothiazide-triamterene 25 mg-37.5 mg oral tablet  -- 1 tab(s) by mouth once a day  -- Takes in AM

## 2017-07-11 NOTE — PROGRESS NOTE ADULT - PROBLEM SELECTOR PLAN 1
-very brief episode (less than a minute as per family) did not quite collapse, family sat him down, but pt does not recall what happened during that minute.   -pt had LIT and what is likely hypovolemic hyponatremia on admission and was hemoconcentrated with Hb of 15.   -given that episode happened in the morning while pt standing up in [employer] and pt on 5 different anti-hypertensives (including HCTZ) the syncope was likely orthostatic (possibly vasovagal component as pt also states he was constipated, but felt the need to have a BM and was consciously holding it)  -EKG NSR with PACs; old anteroseptal q-waves  -pt feeling well after hydration.   -do not suspect infection. Hold antibiotics and if no sign of infection overnight, consider discharge tomorrow.
resolved, no evidence that this was due to infectious process and recommend no abx. Suggested patient eat and drink prior to attending [facility]. Fine for transition back to home per ID.

## 2017-07-11 NOTE — DISCHARGE NOTE ADULT - HOSPITAL COURSE
HPI:  89 y/o M with PMHx of pancreatic CA (started on chemo 4 days ago), bladder CA, HTN, BPH, dementia, hypothyroidism presents with episode of near syncope after standing in [employer]. Daughters Tatiana and Ricky at bedside. States that patient is fully independent. Patient has had mild cough since the morning of admission, states that he feels as though his throat is dry. Patient has just started chemotherapy on Thurs 7/6 (Gemzar), with no apparent side effects aside from staying awake. Patient's family states that he had a recent PET scan within the last month which was negative. Patient does not remember the circumstances of his episode. However, family states that his hands started shaking and he had a blank stare and started drooling. Admitted to some lightheadedness. Admits to constipation for the past 2 days. Denies fevers, chills, CP, SOB, n/v/d.    Hospital Course:  In the ED, patient's labs were significant for WBC 17.6, mild hyponatremia with Na of 133, LIT with BUN/Cr of 37/1.50, procalc of 0.07. CXR showed no gross consolidation. CT Chest showed 4 mm nodule, subpleural, right upper lobe. Ill-defined groundglass nodule measuring 6 mm right lower lobe best appreciated on axial imaging, series 2 image 99.  Linear scarring, stranding, left lower lobe. No vascular congestion. No effusion. No lobar consolidation. No pneumothorax. No pericardial effusion. No thoracic aortic aneurysm. Calcified coronary arteries evident. Hiatus hernia present. Central airway intact. Thyroid gland not enlarged. No mediastinal or hilar lesions evident, evaluation limited due to lack of IV contrast. Bilateral renal cysts present. The spleen is not identified, correlate clinically. Hypodensity right hepatic lobe likely a cyst too small to characterize. No acute osseous abnormalities. Received ceftriaxone/azithro x1, 2L NS boluses in the ER.  ID Dr. Mendez evaluated. Given procalcitonin negative, afebrile, pt has no cough or other signs of infection on ROS, stopped antibiotics. Pt felt well off antibiotics for >24 hours. Pt had LIT (prerenal azotemia) and hypovolemic hyponatremia on admission improved with hydration. No episodes of dizziness in the hospital. His diuretic was discontinued. Likely had orthostatic syncope early in the morning after taking all his antihypertensives and having very little po intake that day. Pt now feels well. The leukocytosis on admission was likely partially hemoconcentrated and partially stress reaction from syncope and resolved. Pt had hypophosphatemia and hypokalemia which were supplemented and will f/up with PMD for repeat BMP/Phos as outpt. Pt walks unassisted without any symptoms. Pt will f/up the incidentally found pulmonary nodules with his oncologist as outpatient.   Pt discharged to home.    Notified PMD, Dr. Barcenas about the discharge.     Time spent: 45min HPI:  89 y/o M with PMHx of pancreatic CA (started on chemo 4 days ago), bladder CA, HTN, BPH, dementia, hypothyroidism presents with episode of near syncope after standing in [employer]. Daughters Tatiana and Ricky at bedside. States that patient is fully independent. Patient has had mild cough since the morning of admission, states that he feels as though his throat is dry. Patient has just started chemotherapy on Thurs 7/6 (Gemzar), with no apparent side effects aside from staying awake. Patient's family states that he had a recent PET scan within the last month which was negative. Patient does not remember the circumstances of his episode. However, family states that his hands started shaking and he had a blank stare and started drooling. Admitted to some lightheadedness. Admits to constipation for the past 2 days. Denies fevers, chills, CP, SOB, n/v/d.    Hospital Course:  In the ED, patient's labs were significant for WBC 17.6, mild hyponatremia with Na of 133, LIT with BUN/Cr of 37/1.50, procalc of 0.07. CXR showed no gross consolidation. CT Chest showed 4 mm nodule, subpleural, right upper lobe. Ill-defined groundglass nodule measuring 6 mm right lower lobe best appreciated on axial imaging, series 2 image 99.  Linear scarring, stranding, left lower lobe. No vascular congestion. No effusion. No lobar consolidation. No pneumothorax. No pericardial effusion. No thoracic aortic aneurysm. Calcified coronary arteries evident. Hiatus hernia present. Central airway intact. Thyroid gland not enlarged. No mediastinal or hilar lesions evident, evaluation limited due to lack of IV contrast. Bilateral renal cysts present. The spleen is not identified, correlate clinically. Hypodensity right hepatic lobe likely a cyst too small to characterize. No acute osseous abnormalities. Received ceftriaxone/azithro x1, 2L NS boluses in the ER.  ID Dr. Mendez evaluated. Given procalcitonin negative, afebrile, pt has no cough or other signs of infection on ROS, stopped antibiotics. Pt felt well off antibiotics for >24 hours. Pt had LIT (prerenal azotemia) and hypovolemic hyponatremia on admission improved with hydration. No episodes of dizziness in the hospital. His diuretic was discontinued. Likely had orthostatic syncope early in the morning after taking all his antihypertensives and having very little po intake that day. Pt now feels well. The leukocytosis on admission was likely partially hemoconcentrated and partially stress reaction from syncope and resolved. Pt had hypophosphatemia and hypokalemia which were supplemented and will f/up with PMD for repeat BMP/Phos as outpt. Pt walks unassisted without any symptoms. Pt will f/up the incidentally found pulmonary nodules with his oncologist as outpatient.   Pt discharged to home.    Spoke to PMD, Dr. Barcenas about the discharge.     On day of discharge:  ROS: pt feels well. No dizziness, CP, SOB, fever, cough, dysuria, diarrhea  VS: 169/64; HR: 69; T: 98.3; RR: 16; 96% on RA  Phys Exam:  GENERAL: NAD  HEENT:  EOMI, moist mucous membranes  CHEST/LUNG:  CTA b/l, no rales, wheezes, or rhonchi  HEART:  RRR, S1, S2  ABDOMEN:  BS+, soft, nontender, nondistended; well-healed small incisions on abdomen   EXTREMITIES: no edema or calf tenderness  SKIN:  no rash  NERVOUS SYSTEM: AA&Ox3    Time spent: 45min

## 2017-07-11 NOTE — DISCHARGE NOTE ADULT - CARE PROVIDER_API CALL
Vangie Barcenas (ELENA), Internal Medicine  [address] [place] [id number]  Phone: [phone]  Fax: [phone]

## 2017-07-12 RX ORDER — SODIUM,POTASSIUM PHOSPHATES 278-250MG
1 POWDER IN PACKET (EA) ORAL
Qty: 6 | Refills: 0 | OUTPATIENT
Start: 2017-07-12 | End: 2017-07-15

## 2017-07-14 DIAGNOSIS — N40.0 BENIGN PROSTATIC HYPERPLASIA WITHOUT LOWER URINARY TRACT SYMPTOMS: ICD-10-CM

## 2017-07-14 DIAGNOSIS — K59.00 CONSTIPATION, UNSPECIFIED: ICD-10-CM

## 2017-07-14 DIAGNOSIS — E86.0 DEHYDRATION: ICD-10-CM

## 2017-07-14 DIAGNOSIS — F03.90 UNSPECIFIED DEMENTIA, UNSPECIFIED SEVERITY, WITHOUT BEHAVIORAL DISTURBANCE, PSYCHOTIC DISTURBANCE, MOOD DISTURBANCE, AND ANXIETY: ICD-10-CM

## 2017-07-14 DIAGNOSIS — R91.1 SOLITARY PULMONARY NODULE: ICD-10-CM

## 2017-07-14 DIAGNOSIS — R55 SYNCOPE AND COLLAPSE: ICD-10-CM

## 2017-07-14 DIAGNOSIS — Z85.51 PERSONAL HISTORY OF MALIGNANT NEOPLASM OF BLADDER: ICD-10-CM

## 2017-07-14 DIAGNOSIS — E03.9 HYPOTHYROIDISM, UNSPECIFIED: ICD-10-CM

## 2017-07-14 DIAGNOSIS — E83.39 OTHER DISORDERS OF PHOSPHORUS METABOLISM: ICD-10-CM

## 2017-07-14 DIAGNOSIS — C77.9 SECONDARY AND UNSPECIFIED MALIGNANT NEOPLASM OF LYMPH NODE, UNSPECIFIED: ICD-10-CM

## 2017-07-14 DIAGNOSIS — C25.9 MALIGNANT NEOPLASM OF PANCREAS, UNSPECIFIED: ICD-10-CM

## 2017-07-14 DIAGNOSIS — N17.9 ACUTE KIDNEY FAILURE, UNSPECIFIED: ICD-10-CM

## 2017-07-14 DIAGNOSIS — I10 ESSENTIAL (PRIMARY) HYPERTENSION: ICD-10-CM

## 2017-07-14 DIAGNOSIS — Z87.891 PERSONAL HISTORY OF NICOTINE DEPENDENCE: ICD-10-CM

## 2017-07-14 DIAGNOSIS — E87.1 HYPO-OSMOLALITY AND HYPONATREMIA: ICD-10-CM

## 2017-07-14 DIAGNOSIS — E87.6 HYPOKALEMIA: ICD-10-CM

## 2017-07-14 LAB
CULTURE RESULTS: SIGNIFICANT CHANGE UP
CULTURE RESULTS: SIGNIFICANT CHANGE UP
SPECIMEN SOURCE: SIGNIFICANT CHANGE UP
SPECIMEN SOURCE: SIGNIFICANT CHANGE UP

## 2017-09-13 ENCOUNTER — APPOINTMENT (OUTPATIENT)
Dept: SURGICAL ONCOLOGY | Facility: CLINIC | Age: 82
End: 2017-09-13

## 2017-09-13 DIAGNOSIS — K86.89 OTHER SPECIFIED DISEASES OF PANCREAS: ICD-10-CM

## 2018-06-02 ENCOUNTER — INPATIENT (INPATIENT)
Facility: HOSPITAL | Age: 83
LOS: 3 days | Discharge: HOME HEALTH | DRG: 291 | End: 2018-06-06
Attending: FAMILY MEDICINE | Admitting: INTERNAL MEDICINE
Payer: COMMERCIAL

## 2018-06-02 VITALS
HEART RATE: 60 BPM | HEIGHT: 65 IN | WEIGHT: 134.92 LBS | SYSTOLIC BLOOD PRESSURE: 232 MMHG | TEMPERATURE: 98 F | RESPIRATION RATE: 15 BRPM | OXYGEN SATURATION: 97 % | DIASTOLIC BLOOD PRESSURE: 97 MMHG

## 2018-06-02 DIAGNOSIS — Z90.81 ACQUIRED ABSENCE OF SPLEEN: Chronic | ICD-10-CM

## 2018-06-02 DIAGNOSIS — I10 ESSENTIAL (PRIMARY) HYPERTENSION: ICD-10-CM

## 2018-06-02 DIAGNOSIS — E87.6 HYPOKALEMIA: ICD-10-CM

## 2018-06-02 DIAGNOSIS — I50.9 HEART FAILURE, UNSPECIFIED: ICD-10-CM

## 2018-06-02 DIAGNOSIS — N40.0 BENIGN PROSTATIC HYPERPLASIA WITHOUT LOWER URINARY TRACT SYMPTOMS: ICD-10-CM

## 2018-06-02 DIAGNOSIS — Z29.9 ENCOUNTER FOR PROPHYLACTIC MEASURES, UNSPECIFIED: ICD-10-CM

## 2018-06-02 DIAGNOSIS — N17.9 ACUTE KIDNEY FAILURE, UNSPECIFIED: ICD-10-CM

## 2018-06-02 DIAGNOSIS — E03.9 HYPOTHYROIDISM, UNSPECIFIED: ICD-10-CM

## 2018-06-02 DIAGNOSIS — I21.4 NON-ST ELEVATION (NSTEMI) MYOCARDIAL INFARCTION: ICD-10-CM

## 2018-06-02 DIAGNOSIS — Z98.890 OTHER SPECIFIED POSTPROCEDURAL STATES: Chronic | ICD-10-CM

## 2018-06-02 DIAGNOSIS — E11.9 TYPE 2 DIABETES MELLITUS WITHOUT COMPLICATIONS: ICD-10-CM

## 2018-06-02 DIAGNOSIS — F03.90 UNSPECIFIED DEMENTIA WITHOUT BEHAVIORAL DISTURBANCE: ICD-10-CM

## 2018-06-02 PROBLEM — C25.9 MALIGNANT NEOPLASM OF PANCREAS, UNSPECIFIED: Chronic | Status: ACTIVE | Noted: 2017-07-09

## 2018-06-02 LAB
ALBUMIN SERPL ELPH-MCNC: 2.5 G/DL — LOW (ref 3.3–5)
ALP SERPL-CCNC: 68 U/L — SIGNIFICANT CHANGE UP (ref 40–120)
ALT FLD-CCNC: 13 U/L — SIGNIFICANT CHANGE UP (ref 12–78)
ANION GAP SERPL CALC-SCNC: 9 MMOL/L — SIGNIFICANT CHANGE UP (ref 5–17)
APTT BLD: 31 SEC — SIGNIFICANT CHANGE UP (ref 27.5–37.4)
AST SERPL-CCNC: 15 U/L — SIGNIFICANT CHANGE UP (ref 15–37)
BASOPHILS # BLD AUTO: 0.08 K/UL — SIGNIFICANT CHANGE UP (ref 0–0.2)
BASOPHILS NFR BLD AUTO: 0.8 % — SIGNIFICANT CHANGE UP (ref 0–2)
BILIRUB SERPL-MCNC: 0.4 MG/DL — SIGNIFICANT CHANGE UP (ref 0.2–1.2)
BUN SERPL-MCNC: 21 MG/DL — SIGNIFICANT CHANGE UP (ref 7–23)
CALCIUM SERPL-MCNC: 8.2 MG/DL — LOW (ref 8.5–10.1)
CHLORIDE SERPL-SCNC: 107 MMOL/L — SIGNIFICANT CHANGE UP (ref 96–108)
CK MB BLD-MCNC: 3.4 % — SIGNIFICANT CHANGE UP (ref 0–3.5)
CK MB BLD-MCNC: 4.5 % — HIGH (ref 0–3.5)
CK MB CFR SERPL CALC: 1.4 NG/ML — SIGNIFICANT CHANGE UP (ref 0–3.6)
CK MB CFR SERPL CALC: 1.8 NG/ML — SIGNIFICANT CHANGE UP (ref 0–3.6)
CK SERPL-CCNC: 40 U/L — SIGNIFICANT CHANGE UP (ref 26–308)
CK SERPL-CCNC: 41 U/L — SIGNIFICANT CHANGE UP (ref 26–308)
CO2 SERPL-SCNC: 29 MMOL/L — SIGNIFICANT CHANGE UP (ref 22–31)
CREAT SERPL-MCNC: 1.9 MG/DL — HIGH (ref 0.5–1.3)
D DIMER BLD IA.RAPID-MCNC: 442 NG/ML DDU — HIGH
EOSINOPHIL # BLD AUTO: 0.33 K/UL — SIGNIFICANT CHANGE UP (ref 0–0.5)
EOSINOPHIL NFR BLD AUTO: 3.2 % — SIGNIFICANT CHANGE UP (ref 0–6)
GLUCOSE SERPL-MCNC: 120 MG/DL — HIGH (ref 70–99)
HCT VFR BLD CALC: 42.7 % — SIGNIFICANT CHANGE UP (ref 39–50)
HGB BLD-MCNC: 14 G/DL — SIGNIFICANT CHANGE UP (ref 13–17)
IMM GRANULOCYTES NFR BLD AUTO: 0.2 % — SIGNIFICANT CHANGE UP (ref 0–1.5)
INR BLD: 1.01 RATIO — SIGNIFICANT CHANGE UP (ref 0.88–1.16)
LYMPHOCYTES # BLD AUTO: 2.99 K/UL — SIGNIFICANT CHANGE UP (ref 1–3.3)
LYMPHOCYTES # BLD AUTO: 28.9 % — SIGNIFICANT CHANGE UP (ref 13–44)
MCHC RBC-ENTMCNC: 27.2 PG — SIGNIFICANT CHANGE UP (ref 27–34)
MCHC RBC-ENTMCNC: 32.8 GM/DL — SIGNIFICANT CHANGE UP (ref 32–36)
MCV RBC AUTO: 83.1 FL — SIGNIFICANT CHANGE UP (ref 80–100)
MONOCYTES # BLD AUTO: 1.28 K/UL — HIGH (ref 0–0.9)
MONOCYTES NFR BLD AUTO: 12.4 % — SIGNIFICANT CHANGE UP (ref 2–14)
NEUTROPHILS # BLD AUTO: 5.66 K/UL — SIGNIFICANT CHANGE UP (ref 1.8–7.4)
NEUTROPHILS NFR BLD AUTO: 54.5 % — SIGNIFICANT CHANGE UP (ref 43–77)
NRBC # BLD: 0 /100 WBCS — SIGNIFICANT CHANGE UP (ref 0–0)
NT-PROBNP SERPL-SCNC: HIGH PG/ML (ref 0–450)
PLATELET # BLD AUTO: 241 K/UL — SIGNIFICANT CHANGE UP (ref 150–400)
POTASSIUM SERPL-MCNC: 3.3 MMOL/L — LOW (ref 3.5–5.3)
POTASSIUM SERPL-SCNC: 3.3 MMOL/L — LOW (ref 3.5–5.3)
PROT SERPL-MCNC: 6.2 G/DL — SIGNIFICANT CHANGE UP (ref 6–8.3)
PROTHROM AB SERPL-ACNC: 11 SEC — SIGNIFICANT CHANGE UP (ref 9.8–12.7)
RBC # BLD: 5.14 M/UL — SIGNIFICANT CHANGE UP (ref 4.2–5.8)
RBC # FLD: 16 % — HIGH (ref 10.3–14.5)
SODIUM SERPL-SCNC: 145 MMOL/L — SIGNIFICANT CHANGE UP (ref 135–145)
TROPONIN I SERPL-MCNC: 0.1 NG/ML — HIGH (ref 0.01–0.04)
TROPONIN I SERPL-MCNC: 0.1 NG/ML — HIGH (ref 0.01–0.04)
WBC # BLD: 10.36 K/UL — SIGNIFICANT CHANGE UP (ref 3.8–10.5)
WBC # FLD AUTO: 10.36 K/UL — SIGNIFICANT CHANGE UP (ref 3.8–10.5)

## 2018-06-02 PROCEDURE — 99291 CRITICAL CARE FIRST HOUR: CPT | Mod: 25

## 2018-06-02 PROCEDURE — 93306 TTE W/DOPPLER COMPLETE: CPT | Mod: 26

## 2018-06-02 PROCEDURE — 99285 EMERGENCY DEPT VISIT HI MDM: CPT

## 2018-06-02 PROCEDURE — 99223 1ST HOSP IP/OBS HIGH 75: CPT | Mod: AI,GC

## 2018-06-02 PROCEDURE — 70450 CT HEAD/BRAIN W/O DYE: CPT | Mod: 26

## 2018-06-02 PROCEDURE — 93010 ELECTROCARDIOGRAM REPORT: CPT

## 2018-06-02 PROCEDURE — 71045 X-RAY EXAM CHEST 1 VIEW: CPT | Mod: 26

## 2018-06-02 PROCEDURE — 99291 CRITICAL CARE FIRST HOUR: CPT

## 2018-06-02 RX ORDER — HYDRALAZINE HCL 50 MG
10 TABLET ORAL ONCE
Qty: 0 | Refills: 0 | Status: COMPLETED | OUTPATIENT
Start: 2018-06-02 | End: 2018-06-02

## 2018-06-02 RX ORDER — DONEPEZIL HYDROCHLORIDE 10 MG/1
10 TABLET, FILM COATED ORAL AT BEDTIME
Qty: 0 | Refills: 0 | Status: DISCONTINUED | OUTPATIENT
Start: 2018-06-02 | End: 2018-06-06

## 2018-06-02 RX ORDER — ISOSORBIDE DINITRATE 5 MG/1
10 TABLET ORAL THREE TIMES A DAY
Qty: 0 | Refills: 0 | Status: DISCONTINUED | OUTPATIENT
Start: 2018-06-02 | End: 2018-06-03

## 2018-06-02 RX ORDER — HYDRALAZINE HCL 50 MG
25 TABLET ORAL ONCE
Qty: 0 | Refills: 0 | Status: COMPLETED | OUTPATIENT
Start: 2018-06-02 | End: 2018-06-02

## 2018-06-02 RX ORDER — LEVOTHYROXINE SODIUM 125 MCG
50 TABLET ORAL DAILY
Qty: 0 | Refills: 0 | Status: DISCONTINUED | OUTPATIENT
Start: 2018-06-02 | End: 2018-06-06

## 2018-06-02 RX ORDER — DEXTROSE 50 % IN WATER 50 %
12.5 SYRINGE (ML) INTRAVENOUS ONCE
Qty: 0 | Refills: 0 | Status: DISCONTINUED | OUTPATIENT
Start: 2018-06-02 | End: 2018-06-06

## 2018-06-02 RX ORDER — SODIUM CHLORIDE 9 MG/ML
1000 INJECTION, SOLUTION INTRAVENOUS
Qty: 0 | Refills: 0 | Status: DISCONTINUED | OUTPATIENT
Start: 2018-06-02 | End: 2018-06-06

## 2018-06-02 RX ORDER — LABETALOL HCL 100 MG
20 TABLET ORAL ONCE
Qty: 0 | Refills: 0 | Status: DISCONTINUED | OUTPATIENT
Start: 2018-06-02 | End: 2018-06-02

## 2018-06-02 RX ORDER — FINASTERIDE 5 MG/1
5 TABLET, FILM COATED ORAL DAILY
Qty: 0 | Refills: 0 | Status: DISCONTINUED | OUTPATIENT
Start: 2018-06-02 | End: 2018-06-06

## 2018-06-02 RX ORDER — ISOSORBIDE DINITRATE 5 MG/1
10 TABLET ORAL ONCE
Qty: 0 | Refills: 0 | Status: COMPLETED | OUTPATIENT
Start: 2018-06-02 | End: 2018-06-02

## 2018-06-02 RX ORDER — HYDRALAZINE HCL 50 MG
50 TABLET ORAL EVERY 8 HOURS
Qty: 0 | Refills: 0 | Status: DISCONTINUED | OUTPATIENT
Start: 2018-06-02 | End: 2018-06-03

## 2018-06-02 RX ORDER — CARVEDILOL PHOSPHATE 80 MG/1
6.25 CAPSULE, EXTENDED RELEASE ORAL EVERY 12 HOURS
Qty: 0 | Refills: 0 | Status: DISCONTINUED | OUTPATIENT
Start: 2018-06-02 | End: 2018-06-06

## 2018-06-02 RX ORDER — LABETALOL HCL 100 MG
10 TABLET ORAL ONCE
Qty: 0 | Refills: 0 | Status: COMPLETED | OUTPATIENT
Start: 2018-06-02 | End: 2018-06-02

## 2018-06-02 RX ORDER — SODIUM CHLORIDE 9 MG/ML
3 INJECTION INTRAMUSCULAR; INTRAVENOUS; SUBCUTANEOUS ONCE
Qty: 0 | Refills: 0 | Status: COMPLETED | OUTPATIENT
Start: 2018-06-02 | End: 2018-06-02

## 2018-06-02 RX ORDER — DEXTROSE 50 % IN WATER 50 %
25 SYRINGE (ML) INTRAVENOUS ONCE
Qty: 0 | Refills: 0 | Status: DISCONTINUED | OUTPATIENT
Start: 2018-06-02 | End: 2018-06-06

## 2018-06-02 RX ORDER — FUROSEMIDE 40 MG
40 TABLET ORAL ONCE
Qty: 0 | Refills: 0 | Status: COMPLETED | OUTPATIENT
Start: 2018-06-02 | End: 2018-06-02

## 2018-06-02 RX ORDER — CHOLECALCIFEROL (VITAMIN D3) 125 MCG
1000 CAPSULE ORAL DAILY
Qty: 0 | Refills: 0 | Status: DISCONTINUED | OUTPATIENT
Start: 2018-06-02 | End: 2018-06-06

## 2018-06-02 RX ORDER — GLUCAGON INJECTION, SOLUTION 0.5 MG/.1ML
1 INJECTION, SOLUTION SUBCUTANEOUS ONCE
Qty: 0 | Refills: 0 | Status: DISCONTINUED | OUTPATIENT
Start: 2018-06-02 | End: 2018-06-06

## 2018-06-02 RX ORDER — NITROGLYCERIN 6.5 MG
5 CAPSULE, EXTENDED RELEASE ORAL
Qty: 50 | Refills: 0 | Status: DISCONTINUED | OUTPATIENT
Start: 2018-06-02 | End: 2018-06-02

## 2018-06-02 RX ORDER — NITROGLYCERIN 6.5 MG
10 CAPSULE, EXTENDED RELEASE ORAL
Qty: 50 | Refills: 0 | Status: DISCONTINUED | OUTPATIENT
Start: 2018-06-02 | End: 2018-06-04

## 2018-06-02 RX ORDER — HEPARIN SODIUM 5000 [USP'U]/ML
5000 INJECTION INTRAVENOUS; SUBCUTANEOUS EVERY 8 HOURS
Qty: 0 | Refills: 0 | Status: DISCONTINUED | OUTPATIENT
Start: 2018-06-02 | End: 2018-06-06

## 2018-06-02 RX ORDER — TAMSULOSIN HYDROCHLORIDE 0.4 MG/1
0.4 CAPSULE ORAL AT BEDTIME
Qty: 0 | Refills: 0 | Status: DISCONTINUED | OUTPATIENT
Start: 2018-06-02 | End: 2018-06-06

## 2018-06-02 RX ORDER — HYDRALAZINE HCL 50 MG
25 TABLET ORAL EVERY 8 HOURS
Qty: 0 | Refills: 0 | Status: DISCONTINUED | OUTPATIENT
Start: 2018-06-02 | End: 2018-06-02

## 2018-06-02 RX ORDER — FINASTERIDE 5 MG/1
1 TABLET, FILM COATED ORAL
Qty: 0 | Refills: 0 | COMMUNITY

## 2018-06-02 RX ORDER — POTASSIUM CHLORIDE 20 MEQ
40 PACKET (EA) ORAL ONCE
Qty: 0 | Refills: 0 | Status: COMPLETED | OUTPATIENT
Start: 2018-06-02 | End: 2018-06-02

## 2018-06-02 RX ORDER — ASPIRIN/CALCIUM CARB/MAGNESIUM 324 MG
81 TABLET ORAL DAILY
Qty: 0 | Refills: 0 | Status: DISCONTINUED | OUTPATIENT
Start: 2018-06-02 | End: 2018-06-06

## 2018-06-02 RX ORDER — FUROSEMIDE 40 MG
60 TABLET ORAL EVERY 12 HOURS
Qty: 0 | Refills: 0 | Status: DISCONTINUED | OUTPATIENT
Start: 2018-06-02 | End: 2018-06-03

## 2018-06-02 RX ORDER — ASPIRIN/CALCIUM CARB/MAGNESIUM 324 MG
325 TABLET ORAL ONCE
Qty: 0 | Refills: 0 | Status: COMPLETED | OUTPATIENT
Start: 2018-06-02 | End: 2018-06-02

## 2018-06-02 RX ORDER — INSULIN LISPRO 100/ML
VIAL (ML) SUBCUTANEOUS
Qty: 0 | Refills: 0 | Status: DISCONTINUED | OUTPATIENT
Start: 2018-06-02 | End: 2018-06-06

## 2018-06-02 RX ORDER — DEXTROSE 50 % IN WATER 50 %
15 SYRINGE (ML) INTRAVENOUS ONCE
Qty: 0 | Refills: 0 | Status: DISCONTINUED | OUTPATIENT
Start: 2018-06-02 | End: 2018-06-06

## 2018-06-02 RX ADMIN — ISOSORBIDE DINITRATE 10 MILLIGRAM(S): 5 TABLET ORAL at 16:42

## 2018-06-02 RX ADMIN — Medication 3 MICROGRAM(S)/MIN: at 21:23

## 2018-06-02 RX ADMIN — ISOSORBIDE DINITRATE 10 MILLIGRAM(S): 5 TABLET ORAL at 21:22

## 2018-06-02 RX ADMIN — CARVEDILOL PHOSPHATE 6.25 MILLIGRAM(S): 80 CAPSULE, EXTENDED RELEASE ORAL at 17:56

## 2018-06-02 RX ADMIN — HEPARIN SODIUM 5000 UNIT(S): 5000 INJECTION INTRAVENOUS; SUBCUTANEOUS at 13:39

## 2018-06-02 RX ADMIN — Medication 60 MILLIGRAM(S): at 17:45

## 2018-06-02 RX ADMIN — Medication 40 MILLIGRAM(S): at 06:14

## 2018-06-02 RX ADMIN — Medication 25 MILLIGRAM(S): at 23:37

## 2018-06-02 RX ADMIN — SODIUM CHLORIDE 3 MILLILITER(S): 9 INJECTION INTRAMUSCULAR; INTRAVENOUS; SUBCUTANEOUS at 04:58

## 2018-06-02 RX ADMIN — TAMSULOSIN HYDROCHLORIDE 0.4 MILLIGRAM(S): 0.4 CAPSULE ORAL at 21:22

## 2018-06-02 RX ADMIN — Medication 1.5 MICROGRAM(S)/MIN: at 08:11

## 2018-06-02 RX ADMIN — Medication 25 MILLIGRAM(S): at 16:30

## 2018-06-02 RX ADMIN — Medication 325 MILLIGRAM(S): at 06:14

## 2018-06-02 RX ADMIN — Medication 10 MILLIGRAM(S): at 23:37

## 2018-06-02 RX ADMIN — HEPARIN SODIUM 5000 UNIT(S): 5000 INJECTION INTRAVENOUS; SUBCUTANEOUS at 21:22

## 2018-06-02 RX ADMIN — DONEPEZIL HYDROCHLORIDE 10 MILLIGRAM(S): 10 TABLET, FILM COATED ORAL at 21:22

## 2018-06-02 RX ADMIN — FINASTERIDE 5 MILLIGRAM(S): 5 TABLET, FILM COATED ORAL at 13:40

## 2018-06-02 RX ADMIN — Medication 10 MILLIGRAM(S): at 05:14

## 2018-06-02 RX ADMIN — Medication 25 MILLIGRAM(S): at 21:22

## 2018-06-02 RX ADMIN — Medication 1000 UNIT(S): at 13:40

## 2018-06-02 RX ADMIN — Medication 40 MILLIEQUIVALENT(S): at 06:14

## 2018-06-02 NOTE — H&P ADULT - NSHPREVIEWOFSYSTEMS_GEN_ALL_CORE
Constitutional: denies fever, chills, diaphoresis   HEENT: denies blurry vision, difficulty hearing  Respiratory: reports SOB, denies SOB, KENNEDY, cough, sputum production, wheezing, hemoptysis  Cardiovascular: reports edema, denies chest pain, palpitations  Gastrointestinal: denies nausea, vomiting, diarrhea, constipation, abdominal pain   Genitourinary: denies dysuria, frequency, urgency, hematuria   Skin/Breast: denies rash, itching  Musculoskeletal: denies myalgias, joint swelling, muscle weakness  Neurologic: denies headache, weakness, dizziness, paresthesias, numbness/tingling  Psychiatric: denies feeling anxious, depressed  Hematology/Oncology: denies bruising, tender or enlarged lymph nodes Constitutional: denies fever, chills, diaphoresis   HEENT: denies blurry vision, difficulty hearing  Respiratory: +reports SOB, +KENNEDY denies cough, sputum production, wheezing, hemoptysis  Cardiovascular: +reports edema, denies chest pain, palpitations  Gastrointestinal: denies nausea, vomiting, diarrhea, constipation, abdominal pain   Genitourinary: denies dysuria, frequency, urgency, hematuria   Skin/Breast: denies rash, itching  Musculoskeletal: denies myalgias, joint swelling, muscle weakness  Neurologic: denies headache, weakness, dizziness, paresthesias, numbness/tingling  Psychiatric: denies feeling anxious, depressed  Hematology/Oncology: denies bruising, tender or enlarged lymph nodes

## 2018-06-02 NOTE — H&P ADULT - HISTORY OF PRESENT ILLNESS
89 yo M with PMH of Dementia, HTN, BPH, Pancreatic Cancer, Bladder Cancer, JASMYN, Hypothyroidism, Renal Cyst presented to the ED with SOB while at rest. Started for the first time a few days ago and resolved on its own. Symptoms returned the day prior to admission while sitting down. At baseline, he lives alone and is able to complete all of his ADLs and does not use home O2.     In the ED, patient was hypertensive (-232/), otherwise hemodynamically stable. In the ED, labs significant for elevated D-Dimer of 442, hypokalemia (K 3.3), elevated Cr (1.9, baseline Cr of 1.1-1.2 per chart review), decreased eGFR (30, baseline of 53-59), elevated trop (.100), and elevated pro-BNP (96538). CT Head negative for acute intracranial hemorrhage, mass effect or evidence of acute territorial infarct. EKG showed ___. Received ASA 325mg PO x1, Lasix 40mg IV x1, Hydralazine 10mg IV x1, Labetalol 20mg IV x1, KCl 40 mEq PO x1. 89 yo M with PMH of Dementia, HTN, BPH, Pancreatic Cancer, Bladder Cancer, JASMYN, Hypothyroidism, Renal Cyst presented to the ED with SOB while at rest. Started for the first time a few days ago and resolved on its own. Symptoms returned the day prior to admission while sitting down. At baseline, he lives alone and is able to complete all of his ADLs and does not use home O2.     In the ED, patient was hypertensive (-232/), otherwise hemodynamically stable. In the ED, labs significant for elevated D-Dimer of 442, hypokalemia (K 3.3), elevated Cr (1.9, baseline Cr of 1.1-1.2 per chart review), decreased eGFR (30, baseline of 53-59), elevated trop (.100), and elevated pro-BNP (64167). CT Head negative for acute intracranial hemorrhage, mass effect or evidence of acute territorial infarct. EKG showed ___. Received ASA 325mg PO x1, Lasix 40mg IV x1, Hydralazine 10mg IV x1, Labetalol 20mg IV x1, KCl 40 mEq PO x1. Started on Nitroglycerin infusion. 89 yo M with PMH of Dementia, HTN, BPH, Pancreatic Cancer, Bladder Cancer, JASMYN, Hypothyroidism, recently diagnosed DM, Renal Cyst presented to the ED with SOB while at rest. Hx obtained from patient and daughter at bedside. Symptoms started for the first time a few days ago and resolved on its own. At baseline, he lives alone and is able to complete all of his ADLs and does not use home O2. As per patient he is short of breath at rest- worst when he wakes up. Today patient was very short of breath upon waking up and called his daughter who brought him in. As per daughter patient has been following up with his PMD and Oncologist who have noted his blood pressure to be elevated (180-190 systolic) over the last few weeks. Noted to have LE edema, PMD thought it may be due to amlodipine which was d/cd and ACEi dose was doubled. However symptoms persisted despite medication change. Denies waking up at night due to SOB, or cough. Denies recent illness, sick contacts, fevers, chills.     In the ED, patient was hypertensive (-232/), otherwise hemodynamically stable. In the ED, labs significant for elevated D-Dimer of 442, hypokalemia (K 3.3), elevated Cr (1.9, baseline Cr of 1.1-1.2 per chart review), decreased eGFR (30, baseline of 53-59), elevated trop (.100), and elevated pro-BNP (01353). CT Head negative for acute intracranial hemorrhage, mass effect or evidence of acute territorial infarct. EKG showed NSR @ 64 BPM w/ signs of LVH. Received ASA 325mg PO x1, Lasix 40mg IV x1, Hydralazine 10mg IV x1, Labetalol 20mg IV x1, KCl 40 mEq PO x1. Started on Nitroglycerin infusion. 89 yo M with PMH of Dementia, HTN, BPH, Pancreatic Cancer, Bladder Cancer, JASMYN, Hypothyroidism, recently diagnosed DM, Renal Cyst presented to the ED with SOB while at rest. Hx obtained from patient and daughter at bedside. Symptoms started for the first time a few days ago and resolved on its own. At baseline, he lives alone and is able to complete all of his ADLs and does not use home O2. As per patient he is short of breath at rest- worst when he wakes up. Today patient was very short of breath upon waking up and called his daughter who brought him in. As per daughter patient has been following up with his PMD and Oncologist who have noted his blood pressure to be elevated (180-190 systolic) over the last few weeks. Noted to have LE edema, PMD thought it may be due to amlodipine which was d/cd and ACEi dose was doubled. However symptoms persisted despite medication change. Denies waking up at night due to SOB, or cough. Denies recent illness, sick contacts, fevers, chills.     In the ED, patient was hypertensive (-232/), otherwise hemodynamically stable. In the ED, labs significant for elevated D-Dimer of 442, hypokalemia (K 3.3), elevated Cr (1.9, baseline Cr of 1.1-1.2 per chart review), decreased eGFR (30, baseline of 53-59), elevated trop (.100), and elevated pro-BNP (63256). CT Head negative for acute intracranial hemorrhage, mass effect or evidence of acute territorial infarct. CXr showed a new focal airspace   infiltrate at the right base presumptive pneumonia, a small right parapneumonic effusion noted, a small left pleural effusion. EKG showed NSR @ 64 BPM w/ signs of LVH. Received ASA 325mg PO x1, Lasix 40mg IV x1, Hydralazine 10mg IV x1, Labetalol 20mg IV x1, KCl 40 mEq PO x1. Started on Nitroglycerin infusion.

## 2018-06-02 NOTE — H&P ADULT - PROBLEM SELECTOR PLAN 1
A/w HF exacerbation possibly 2/2 to HTN emergency  In ED given Lasix, hydralazine, started on nitro drip  Home BP med- Acei held 2/2 LIT  ICU consulted  Cardio- Dr. Alejandra following A/w HF exacerbation possibly 2/2 to HTN emergency  In ED given Lasix, hydralazine, started on nitro drip  Home BP med- Acei held 2/2 LIT  Continue with Coreg with hold parameters  ICU consulted  Cardio- Dr. Alejandra following

## 2018-06-02 NOTE — ED PROVIDER NOTE - OBJECTIVE STATEMENT
89 year old male with a history of dementia, HTN, bladder and pancreatic CA presents with SOB while at rest.  He states it occurred for the first time a few days ago then resolved on its own.  It started again yesterday while sitting down.  He denies cough, chest pain, fevers.  He lives alone, completes all of his ADLS, and does not use home O2.  His daughter at the bedside states patient's BP has been slowly increasing for the last several months.  He was on 3 BP meds but then changed to 2 meds secondary to worsening LE edema.  Patient denies headache, dizziness, blurry vision.  He saw a cardiologist once last year (cannot recall name) but daughter states w/u was negative and he never went back.  No recent travel, immobilization, surgery. He received a flu shot this year. PMD Vangie Barcenas normal (ped)...

## 2018-06-02 NOTE — H&P ADULT - ASSESSMENT
89 yo M with PMH of Dementia, HTN, BPH, Pancreatic Cancer, Bladder Cancer, JASMYN, Hypothyroidism, Renal Cyst presented to the ED 89 yo M with PMH of Dementia, HTN, BPH, Pancreatic Cancer, Bladder Cancer, JASMYN, Hypothyroidism, Renal Cyst presented to the ED a/w HF exacerbation, and LIT possibly 2/2 HTN emergency

## 2018-06-02 NOTE — CONSULT NOTE ADULT - ATTENDING COMMENTS
Patient seen and evaluated independently from above.     This is a 89-year-old male with a history of HTN, BPH, recently diagnosed DM2, hypothyroidism, and CKD who presents with worsening dyspnea on exertion, found to have hypertensive emergency with pulmonary edema and acute decompensated heart failure, suspect diastolic.    - Admit to ICU  - No neuro issues at present, resume donepezil for history of mild memory loss  - HTN emergency, start nitroglycerin gtt, titrate for goal SBP<170-180  - Start isordil 10 mg tid, hydralazine 25 mg tid, wean off nitroglycerin gtt; resume home carvedilol 6.25 bid  - Diuresis with furosemide, goal net negative 1L/24hrs, strict I/O's  - Start aspirin 81, check FLP/A1C/TSH  - 2D-echo evaluate for systolic/diastolic/valvular heart disease  - Mild hypoxia due to pulmonary edema, continue diuresis, does not require CPAP at this time  - DASH consistent carb diet as tolerated  - Mild LIT on CKD, strict I/O's, trend BUN/Cr/K/HCO3  - Observe off abx. CXR read as possible RLL pneumonia, but clinically patient with CHF, no fevers or leukocytosis, no sputum production  - DVT ppx with HSQ  - Continue synthroid, start HISS, f/up TSH/A1C  - Discussed with patient and daughter at bedside  CC time spent: 35 min Patient seen and evaluated independently from above.     This is a 89-year-old male with a history of HTN, BPH, recently diagnosed DM2, hypothyroidism, and CKD who presents with worsening dyspnea on exertion, found to have hypertensive emergency with pulmonary edema and acute decompensated heart failure, suspect diastolic.    - Admit to ICU  - No neuro issues at present, resume donepezil for history of mild memory loss  - HTN emergency, start nitroglycerin gtt, titrate for goal SBP<170-180  - Start isordil 10 mg tid, hydralazine 25 mg tid, wean off nitroglycerin gtt; resume home carvedilol 6.25 bid  - Diuresis with furosemide, goal net negative 1L/24hrs, strict I/O's  - Start aspirin 81, check FLP/A1C/TSH  - 2D-echo evaluate for systolic/diastolic/valvular heart disease  - Mild hypoxia due to pulmonary edema, continue diuresis, does not require CPAP at this time  - DASH consistent carb diet as tolerated  - Mild LIT on CKD, strict I/O's, trend BUN/Cr/K/HCO3  - Observe off abx. CXR read as possible RLL pneumonia, but clinically patient with CHF, no fevers or leukocytosis, no sputum production  - DVT ppx with HSQ  - Continue synthroid, start HISS, f/up TSH/A1C  - Discussed with patient and daughters at bedside  CC time spent: 35 min

## 2018-06-02 NOTE — ED ADULT NURSE NOTE - OBJECTIVE STATEMENT
Pt. presenting to the ED complaining of SOB. Pt. states that he was SOB a few days ago but that it resolved on its own initially. The SOB came back today while he was resting/not exerting himself. Denies cough, fever or chills. Pt. blood pressure was elevated in triage and at bedside. Pt. has mild pedal edema in both lower extremities. Saturation 97% on room air. EKG completed and read by MD Melton. IV inserted in right wrist #20. Denies CP or diaphoresis.

## 2018-06-02 NOTE — H&P ADULT - NSHPPHYSICALEXAM_GEN_ALL_CORE
Vital Signs Last 24 Hrs  T(C): 36.6 (02 Jun 2018 06:41), Max: 36.8 (02 Jun 2018 04:33)  T(F): 97.9 (02 Jun 2018 06:41), Max: 98.2 (02 Jun 2018 04:33)  HR: 64 (02 Jun 2018 06:41) (59 - 68)  BP: 222/82 (02 Jun 2018 06:41) (201/87 - 232/97)  RR: 16 (02 Jun 2018 06:41) (15 - 16)  SpO2: 97% (02 Jun 2018 06:41) (97% - 99%)    Physical Exam: Vital Signs Last 24 Hrs  T(C): 36.6 (02 Jun 2018 06:41), Max: 36.8 (02 Jun 2018 04:33)  T(F): 97.9 (02 Jun 2018 06:41), Max: 98.2 (02 Jun 2018 04:33)  HR: 64 (02 Jun 2018 06:41) (59 - 68)  BP: 222/82 (02 Jun 2018 06:41) (201/87 - 232/97)  RR: 16 (02 Jun 2018 06:41) (15 - 16)  SpO2: 97% (02 Jun 2018 06:41) (97% - 99%)    Physical Exam:  General: Elderly  male in NAD  HEENT: NCAT, PERRLA, EOMI bl, moist mucous membranes   Neck: Supple, nontender, no mass  Neurology: A&Ox3, nonfocal, CN II-XII grossly intact  Respiratory: Crackles at lower lung bases b/l. No wheezing, ronchi  CV: RRR, +S1/S2, no murmurs, rubs or gallops  Abdominal: Soft, NT, ND +BSx4  Extremities: +1 pitting Edema in LE b/l  MSK: Normal ROM, no joint erythema or warmth, no joint swelling   Skin: warm, dry, normal color, no rash or abnormal lesions

## 2018-06-02 NOTE — ED PROVIDER NOTE - MUSCULOSKELETAL, MLM
Spine appears normal, range of motion is not limited, no muscle or joint tenderness, B/L 1+ LE pitting edmea

## 2018-06-02 NOTE — CONSULT NOTE ADULT - ASSESSMENT
This is a 89 year old man with a history of hypertension, newly diagnosed diabetes, dementia who presents to the ED with progressive dyspnea with exertion, now with dyspnea at rest, hypertensive emergency, acute decompensated heart failure, pulmonary edema:    - ICU evaluation called.  I feel the patient would be best served in the ICU  - I would start a nitro gtt and titrate for a decrease in his BP by 20% over 24 hours  - I would start isordil 10 TID along with the nitro gtt, and hydralazine 25 TID  - Stop benazapril at this point given elevated creatinine  - Lasix 80 IV BID to maintain negative balance  - Monitor I, O, K, creatinine closely  - Supplemental oxygen as needed  - Continue carvedilol 6.25 bid  - Aspirin 81 qd  - check fasting lipids  - He needs a 2D echocardiogram to assess his cardiac structure and function  - Continue levothyroxine. Check TSH  - Monitor and replete lytes  - To follow closely.  Patient is critically ill.  Time greater than 35 minutes.  Discussed with daughter at bedside in detail.

## 2018-06-02 NOTE — H&P ADULT - PROBLEM SELECTOR PLAN 2
w/ SOB, KENNEDY, and elv proBNP  given lasix in ED, c/w IV lasix as per ICU and cardio recs  strict i/os  f/u echo  f/u cardio recs

## 2018-06-02 NOTE — ED PROVIDER NOTE - PROGRESS NOTE DETAILS
Patient and daughter informed of results of blood work, CT, +trop and pro-BNP. Patient denies chest pain.  Will admit for CHF, repeat CE, and cardiology evaluation

## 2018-06-02 NOTE — ED ADULT NURSE NOTE - CHPI ED SYMPTOMS NEG
no chest pain/no cough/no diaphoresis/no chills/no fever/no headache/no hemoptysis/no body aches/no wheezing

## 2018-06-02 NOTE — H&P ADULT - PROBLEM SELECTOR PLAN 10
DVT PPX: HSQ    IMPROVE VTE Individual Risk Assessment          RISK                                                          Points  [  ] Previous VTE                                                3  [  ] Thrombophilia                                             2  [  ] Lower limb paralysis                                   2        (unable to hold up >15 seconds)    [  ] Current Cancer                                             2         (within 6 months)  [  ] Immobilization > 24 hrs                              1  [  ] ICU/CCU stay > 24 hours                             1  [ X ] Age > 60                                                         1    IMPROVE VTE Score: 1

## 2018-06-02 NOTE — H&P ADULT - NSHPSOCIALHISTORY_GEN_ALL_CORE
Social History:    Marital Status:   Occupation:   Lives with:   Ambulates at home:    Substance Use:  Tobacco Usage: Former smoker, quit 20-30 years ago  Alcohol Usage:   Illicit Drug Usage:     Advanced Directives: , lives at home alone; daughter 10 min away  Ambulates independently, drives  Former smoker 1ppd x 20 years; quit 30+ years ago  Social ETOH 1 x a week beer/wine    Advance Directives: none (full code per family)    Flu shot: 2017  Pna shot: June 2016  TD: 2008  Meningococcal: 4/21/2017

## 2018-06-02 NOTE — H&P ADULT - PROBLEM SELECTOR PLAN 3
w/ elevated troponin (.100). Other CE wnl  F/u CE w/ elevated troponin (.100). Other CE wnl  F/u CE, lipid panel  Cont ASA

## 2018-06-02 NOTE — H&P ADULT - ATTENDING COMMENTS
Pt seen and examined bedside, has no complaints. feels better.  89 yo M with PMH of Dementia, HTN, BPH, Pancreatic Cancer, Bladder Cancer, JASMYN, Hypothyroidism, Renal Cyst presented to the ED a/w HF exacerbation, and LIT possibly 2/2 HTN emergency. Admitted to ICU for BP control and CHF exacerbation.  Pt is on Nitro infusion for BP control. Hold ACEI/ARB for ckd.  D/W family Pt is full code.

## 2018-06-03 LAB
ALBUMIN SERPL ELPH-MCNC: 2.3 G/DL — LOW (ref 3.3–5)
ALP SERPL-CCNC: 63 U/L — SIGNIFICANT CHANGE UP (ref 40–120)
ALT FLD-CCNC: 9 U/L — LOW (ref 12–78)
ANION GAP SERPL CALC-SCNC: 11 MMOL/L — SIGNIFICANT CHANGE UP (ref 5–17)
ANION GAP SERPL CALC-SCNC: 8 MMOL/L — SIGNIFICANT CHANGE UP (ref 5–17)
AST SERPL-CCNC: 13 U/L — LOW (ref 15–37)
BASOPHILS # BLD AUTO: 0.08 K/UL — SIGNIFICANT CHANGE UP (ref 0–0.2)
BASOPHILS NFR BLD AUTO: 0.7 % — SIGNIFICANT CHANGE UP (ref 0–2)
BILIRUB SERPL-MCNC: 0.5 MG/DL — SIGNIFICANT CHANGE UP (ref 0.2–1.2)
BUN SERPL-MCNC: 19 MG/DL — SIGNIFICANT CHANGE UP (ref 7–23)
BUN SERPL-MCNC: 19 MG/DL — SIGNIFICANT CHANGE UP (ref 7–23)
CALCIUM SERPL-MCNC: 7.9 MG/DL — LOW (ref 8.5–10.1)
CALCIUM SERPL-MCNC: 7.9 MG/DL — LOW (ref 8.5–10.1)
CHLORIDE SERPL-SCNC: 97 MMOL/L — SIGNIFICANT CHANGE UP (ref 96–108)
CHLORIDE SERPL-SCNC: 99 MMOL/L — SIGNIFICANT CHANGE UP (ref 96–108)
CHOLEST SERPL-MCNC: 210 MG/DL — HIGH (ref 10–199)
CO2 SERPL-SCNC: 32 MMOL/L — HIGH (ref 22–31)
CO2 SERPL-SCNC: 33 MMOL/L — HIGH (ref 22–31)
CREAT SERPL-MCNC: 2 MG/DL — HIGH (ref 0.5–1.3)
CREAT SERPL-MCNC: 2.1 MG/DL — HIGH (ref 0.5–1.3)
EOSINOPHIL # BLD AUTO: 0.13 K/UL — SIGNIFICANT CHANGE UP (ref 0–0.5)
EOSINOPHIL NFR BLD AUTO: 1.2 % — SIGNIFICANT CHANGE UP (ref 0–6)
GLUCOSE SERPL-MCNC: 120 MG/DL — HIGH (ref 70–99)
GLUCOSE SERPL-MCNC: 131 MG/DL — HIGH (ref 70–99)
HBA1C BLD-MCNC: 6.3 % — HIGH (ref 4–5.6)
HCT VFR BLD CALC: 38.6 % — LOW (ref 39–50)
HDLC SERPL-MCNC: 42 MG/DL — SIGNIFICANT CHANGE UP (ref 40–125)
HGB BLD-MCNC: 13 G/DL — SIGNIFICANT CHANGE UP (ref 13–17)
IMM GRANULOCYTES NFR BLD AUTO: 0.4 % — SIGNIFICANT CHANGE UP (ref 0–1.5)
LIPID PNL WITH DIRECT LDL SERPL: 142 MG/DL — HIGH
LYMPHOCYTES # BLD AUTO: 2.38 K/UL — SIGNIFICANT CHANGE UP (ref 1–3.3)
LYMPHOCYTES # BLD AUTO: 21.7 % — SIGNIFICANT CHANGE UP (ref 13–44)
MAGNESIUM SERPL-MCNC: 1.7 MG/DL — SIGNIFICANT CHANGE UP (ref 1.6–2.6)
MAGNESIUM SERPL-MCNC: 2.2 MG/DL — SIGNIFICANT CHANGE UP (ref 1.6–2.6)
MCHC RBC-ENTMCNC: 27.5 PG — SIGNIFICANT CHANGE UP (ref 27–34)
MCHC RBC-ENTMCNC: 33.7 GM/DL — SIGNIFICANT CHANGE UP (ref 32–36)
MCV RBC AUTO: 81.8 FL — SIGNIFICANT CHANGE UP (ref 80–100)
MONOCYTES # BLD AUTO: 1.18 K/UL — HIGH (ref 0–0.9)
MONOCYTES NFR BLD AUTO: 10.7 % — SIGNIFICANT CHANGE UP (ref 2–14)
NEUTROPHILS # BLD AUTO: 7.17 K/UL — SIGNIFICANT CHANGE UP (ref 1.8–7.4)
NEUTROPHILS NFR BLD AUTO: 65.3 % — SIGNIFICANT CHANGE UP (ref 43–77)
PHOSPHATE SERPL-MCNC: 2.5 MG/DL — SIGNIFICANT CHANGE UP (ref 2.5–4.5)
PHOSPHATE SERPL-MCNC: 2.6 MG/DL — SIGNIFICANT CHANGE UP (ref 2.5–4.5)
PLATELET # BLD AUTO: 246 K/UL — SIGNIFICANT CHANGE UP (ref 150–400)
POTASSIUM SERPL-MCNC: 3.1 MMOL/L — LOW (ref 3.5–5.3)
POTASSIUM SERPL-MCNC: 3.6 MMOL/L — SIGNIFICANT CHANGE UP (ref 3.5–5.3)
POTASSIUM SERPL-SCNC: 3.1 MMOL/L — LOW (ref 3.5–5.3)
POTASSIUM SERPL-SCNC: 3.6 MMOL/L — SIGNIFICANT CHANGE UP (ref 3.5–5.3)
PROT SERPL-MCNC: 5.6 G/DL — LOW (ref 6–8.3)
RBC # BLD: 4.72 M/UL — SIGNIFICANT CHANGE UP (ref 4.2–5.8)
RBC # FLD: 15.7 % — HIGH (ref 10.3–14.5)
SODIUM SERPL-SCNC: 137 MMOL/L — SIGNIFICANT CHANGE UP (ref 135–145)
SODIUM SERPL-SCNC: 143 MMOL/L — SIGNIFICANT CHANGE UP (ref 135–145)
TOTAL CHOLESTEROL/HDL RATIO MEASUREMENT: 5 RATIO — SIGNIFICANT CHANGE UP (ref 3.4–9.6)
TRIGL SERPL-MCNC: 132 MG/DL — SIGNIFICANT CHANGE UP (ref 10–149)
TSH SERPL-MCNC: 3.26 UIU/ML — SIGNIFICANT CHANGE UP (ref 0.36–3.74)
WBC # BLD: 10.98 K/UL — HIGH (ref 3.8–10.5)
WBC # FLD AUTO: 10.98 K/UL — HIGH (ref 3.8–10.5)

## 2018-06-03 PROCEDURE — 99291 CRITICAL CARE FIRST HOUR: CPT

## 2018-06-03 PROCEDURE — 99233 SBSQ HOSP IP/OBS HIGH 50: CPT

## 2018-06-03 RX ORDER — ISOSORBIDE DINITRATE 5 MG/1
20 TABLET ORAL THREE TIMES A DAY
Qty: 0 | Refills: 0 | Status: DISCONTINUED | OUTPATIENT
Start: 2018-06-03 | End: 2018-06-04

## 2018-06-03 RX ORDER — SENNA PLUS 8.6 MG/1
2 TABLET ORAL AT BEDTIME
Qty: 0 | Refills: 0 | Status: DISCONTINUED | OUTPATIENT
Start: 2018-06-03 | End: 2018-06-06

## 2018-06-03 RX ORDER — POTASSIUM CHLORIDE 20 MEQ
40 PACKET (EA) ORAL EVERY 4 HOURS
Qty: 0 | Refills: 0 | Status: COMPLETED | OUTPATIENT
Start: 2018-06-03 | End: 2018-06-03

## 2018-06-03 RX ORDER — ISOSORBIDE DINITRATE 5 MG/1
10 TABLET ORAL ONCE
Qty: 0 | Refills: 0 | Status: COMPLETED | OUTPATIENT
Start: 2018-06-03 | End: 2018-06-03

## 2018-06-03 RX ORDER — MAGNESIUM SULFATE 500 MG/ML
2 VIAL (ML) INJECTION ONCE
Qty: 0 | Refills: 0 | Status: COMPLETED | OUTPATIENT
Start: 2018-06-03 | End: 2018-06-03

## 2018-06-03 RX ORDER — MAGNESIUM SULFATE 500 MG/ML
2 VIAL (ML) INJECTION ONCE
Qty: 0 | Refills: 0 | Status: DISCONTINUED | OUTPATIENT
Start: 2018-06-03 | End: 2018-06-03

## 2018-06-03 RX ORDER — POTASSIUM CHLORIDE 20 MEQ
40 PACKET (EA) ORAL ONCE
Qty: 0 | Refills: 0 | Status: COMPLETED | OUTPATIENT
Start: 2018-06-03 | End: 2018-06-03

## 2018-06-03 RX ORDER — HYDRALAZINE HCL 50 MG
75 TABLET ORAL EVERY 8 HOURS
Qty: 0 | Refills: 0 | Status: DISCONTINUED | OUTPATIENT
Start: 2018-06-03 | End: 2018-06-04

## 2018-06-03 RX ORDER — ATORVASTATIN CALCIUM 80 MG/1
10 TABLET, FILM COATED ORAL AT BEDTIME
Qty: 0 | Refills: 0 | Status: DISCONTINUED | OUTPATIENT
Start: 2018-06-03 | End: 2018-06-06

## 2018-06-03 RX ORDER — FUROSEMIDE 40 MG
40 TABLET ORAL EVERY 12 HOURS
Qty: 0 | Refills: 0 | Status: DISCONTINUED | OUTPATIENT
Start: 2018-06-03 | End: 2018-06-04

## 2018-06-03 RX ORDER — DOCUSATE SODIUM 100 MG
100 CAPSULE ORAL DAILY
Qty: 0 | Refills: 0 | Status: DISCONTINUED | OUTPATIENT
Start: 2018-06-03 | End: 2018-06-06

## 2018-06-03 RX ORDER — POLYETHYLENE GLYCOL 3350 17 G/17G
17 POWDER, FOR SOLUTION ORAL DAILY
Qty: 0 | Refills: 0 | Status: DISCONTINUED | OUTPATIENT
Start: 2018-06-03 | End: 2018-06-06

## 2018-06-03 RX ADMIN — Medication 40 MILLIGRAM(S): at 18:32

## 2018-06-03 RX ADMIN — Medication 1000 UNIT(S): at 11:17

## 2018-06-03 RX ADMIN — Medication 50 GRAM(S): at 06:50

## 2018-06-03 RX ADMIN — Medication 1: at 08:20

## 2018-06-03 RX ADMIN — Medication 50 MILLIGRAM(S): at 13:25

## 2018-06-03 RX ADMIN — TAMSULOSIN HYDROCHLORIDE 0.4 MILLIGRAM(S): 0.4 CAPSULE ORAL at 21:21

## 2018-06-03 RX ADMIN — Medication 75 MILLIGRAM(S): at 17:27

## 2018-06-03 RX ADMIN — ATORVASTATIN CALCIUM 10 MILLIGRAM(S): 80 TABLET, FILM COATED ORAL at 21:21

## 2018-06-03 RX ADMIN — ISOSORBIDE DINITRATE 10 MILLIGRAM(S): 5 TABLET ORAL at 05:05

## 2018-06-03 RX ADMIN — HEPARIN SODIUM 5000 UNIT(S): 5000 INJECTION INTRAVENOUS; SUBCUTANEOUS at 14:24

## 2018-06-03 RX ADMIN — Medication 40 MILLIEQUIVALENT(S): at 11:17

## 2018-06-03 RX ADMIN — Medication 50 MILLIGRAM(S): at 05:05

## 2018-06-03 RX ADMIN — CARVEDILOL PHOSPHATE 6.25 MILLIGRAM(S): 80 CAPSULE, EXTENDED RELEASE ORAL at 05:05

## 2018-06-03 RX ADMIN — ISOSORBIDE DINITRATE 10 MILLIGRAM(S): 5 TABLET ORAL at 06:50

## 2018-06-03 RX ADMIN — DONEPEZIL HYDROCHLORIDE 10 MILLIGRAM(S): 10 TABLET, FILM COATED ORAL at 21:26

## 2018-06-03 RX ADMIN — HEPARIN SODIUM 5000 UNIT(S): 5000 INJECTION INTRAVENOUS; SUBCUTANEOUS at 21:26

## 2018-06-03 RX ADMIN — SENNA PLUS 2 TABLET(S): 8.6 TABLET ORAL at 21:21

## 2018-06-03 RX ADMIN — ISOSORBIDE DINITRATE 20 MILLIGRAM(S): 5 TABLET ORAL at 21:21

## 2018-06-03 RX ADMIN — Medication 40 MILLIEQUIVALENT(S): at 14:23

## 2018-06-03 RX ADMIN — HEPARIN SODIUM 5000 UNIT(S): 5000 INJECTION INTRAVENOUS; SUBCUTANEOUS at 05:04

## 2018-06-03 RX ADMIN — FINASTERIDE 5 MILLIGRAM(S): 5 TABLET, FILM COATED ORAL at 11:17

## 2018-06-03 RX ADMIN — Medication 60 MILLIGRAM(S): at 05:04

## 2018-06-03 RX ADMIN — Medication 81 MILLIGRAM(S): at 11:17

## 2018-06-03 RX ADMIN — Medication 75 MILLIGRAM(S): at 23:24

## 2018-06-03 RX ADMIN — ISOSORBIDE DINITRATE 20 MILLIGRAM(S): 5 TABLET ORAL at 14:23

## 2018-06-03 RX ADMIN — Medication 50 MICROGRAM(S): at 05:05

## 2018-06-03 RX ADMIN — Medication 3 MICROGRAM(S)/MIN: at 05:04

## 2018-06-03 RX ADMIN — Medication 40 MILLIEQUIVALENT(S): at 06:50

## 2018-06-03 NOTE — DIETITIAN INITIAL EVALUATION ADULT. - PROBLEM SELECTOR PLAN 1
A/w HF exacerbation possibly 2/2 to HTN emergency  In ED given Lasix, hydralazine, started on nitro drip  Home BP med- Acei held 2/2 LIT  Continue with Coreg with hold parameters  ICU consulted  Cardio- Dr. Alejandra following

## 2018-06-03 NOTE — DIETITIAN INITIAL EVALUATION ADULT. - SIGNS/SYMPTOMS
as evidenced by HTN emergency, CHF, lasix rx. as evidenced by reported 20lb gradual weight loss, fair appetite/po intake, nausea.

## 2018-06-03 NOTE — PROGRESS NOTE ADULT - SUBJECTIVE AND OBJECTIVE BOX
[facility] - Michael Bernal, [place], [place], [place], Jazmyn Mccall  Office Number:  [phone]    Patient resting comfortably in bed in NAD.  Laying flat with no respiratory distress.  No complaints of chest pain. Dyspnea reportedly much improved.    Telemetry:  Sinus rhythm with no events.    MEDICATIONS  (STANDING):  aspirin  chewable 81 milliGRAM(s) Oral daily  carvedilol 6.25 milliGRAM(s) Oral every 12 hours  cholecalciferol 1000 Unit(s) Oral daily  dextrose 5%. 1000 milliLiter(s) (50 mL/Hr) IV Continuous <Continuous>  dextrose 50% Injectable 12.5 Gram(s) IV Push once  dextrose 50% Injectable 25 Gram(s) IV Push once  dextrose 50% Injectable 25 Gram(s) IV Push once  donepezil 10 milliGRAM(s) Oral at bedtime  finasteride 5 milliGRAM(s) Oral daily  furosemide   Injectable 60 milliGRAM(s) IV Push every 12 hours  heparin  Injectable 5000 Unit(s) SubCutaneous every 8 hours  hydrALAZINE 50 milliGRAM(s) Oral every 8 hours  insulin lispro (HumaLOG) corrective regimen sliding scale   SubCutaneous Before meals and at bedtime  isosorbide   dinitrate Tablet (ISORDIL) 20 milliGRAM(s) Oral three times a day  isosorbide   dinitrate Tablet (ISORDIL) 10 milliGRAM(s) Oral once  levothyroxine 50 MICROGram(s) Oral daily  magnesium sulfate  IVPB 2 Gram(s) IV Intermittent once  nitroglycerin  Infusion 10 MICROgram(s)/Min (3 mL/Hr) IV Continuous <Continuous>  potassium chloride   Powder 40 milliEquivalent(s) Oral once  tamsulosin 0.4 milliGRAM(s) Oral at bedtime    MEDICATIONS  (PRN):  dextrose 40% Gel 15 Gram(s) Oral once PRN Blood Glucose LESS THAN 70 milliGRAM(s)/deciliter  glucagon  Injectable 1 milliGRAM(s) IntraMuscular once PRN Glucose LESS THAN 70 milligrams/deciliter      Allergies    No Known Allergies        Vital Signs Last 24 Hrs  T(C): 36.7 (03 Jun 2018 04:00), Max: 36.9 (02 Jun 2018 19:50)  T(F): 98 (03 Jun 2018 04:00), Max: 98.5 (02 Jun 2018 19:50)  HR: 56 (03 Jun 2018 06:30) (52 - 124)  BP: 164/72 (03 Jun 2018 06:30) (124/76 - 238/103)  BP(mean): 103 (03 Jun 2018 06:30) (94 - 158)  RR: 19 (03 Jun 2018 06:30) (14 - 38)  SpO2: 92% (03 Jun 2018 06:30) (91% - 95%)    I&O's Summary    02 Jun 2018 07:01  -  03 Jun 2018 06:44  --------------------------------------------------------  IN: 963.5 mL / OUT: 2800 mL / NET: -1836.5 mL        ON EXAM:    General: NAD, awake and alert, oriented x 3  HEENT: Mucous membranes are dry, anicteric  Lungs: Non-labored, breath sounds are clear bilaterally, No wheezing, rales or rhonchi  Cardiovascular: Regular, S1 and S2, no murmurs, rubs, or gallops  Gastrointestinal: Bowel Sounds present, soft, nontender.   Lymph: No peripheral edema. This is much improved.  No lymphadenopathy.  Skin: No rashes or ulcers  Psych:  Mood & affect appropriate    LABS: All Labs Reviewed:                        13.0   10.98 )-----------( 246      ( 03 Jun 2018 05:36 )             38.6                         14.0   10.36 )-----------( 241      ( 02 Jun 2018 05:02 )             42.7     03 Jun 2018 05:36    143    |  99     |  19     ----------------------------<  120    3.1     |  33     |  2.10   02 Jun 2018 05:02    145    |  107    |  21     ----------------------------<  120    3.3     |  29     |  1.90     Ca    7.9        03 Jun 2018 05:36  Ca    8.2        02 Jun 2018 05:02  Phos  2.6       03 Jun 2018 05:36  Mg     1.7       03 Jun 2018 05:36    TPro  5.6    /  Alb  2.3    /  TBili  0.5    /  DBili  x      /  AST  13     /  ALT  9      /  AlkPhos  63     03 Jun 2018 05:36  TPro  6.2    /  Alb  2.5    /  TBili  0.4    /  DBili  x      /  AST  15     /  ALT  13     /  AlkPhos  68     02 Jun 2018 05:02    PT/INR - ( 02 Jun 2018 05:02 )   PT: 11.0 sec;   INR: 1.01 ratio         PTT - ( 02 Jun 2018 05:02 )  PTT:31.0 sec  CARDIAC MARKERS ( 02 Jun 2018 12:02 )  .097 ng/mL / x     / 40 U/L / x     / 1.8 ng/mL  CARDIAC MARKERS ( 02 Jun 2018 05:02 )  .100 ng/mL / x     / 41 U/L / x     / 1.4 ng/mL      Blood Culture:   06-02 @ 05:02  Pro Bnp [id number]    06-03 @ 05:36  TSH: 3.26

## 2018-06-03 NOTE — DIETITIAN INITIAL EVALUATION ADULT. - NS FNS WEIGHT CHANGE REASON
unintentional/Pt reports gradual weight loss ~20lb over past few months unintentional/Pt reports gradual weight loss ~20lb over past few months; hx dementia unsure of pts reliability

## 2018-06-03 NOTE — PROGRESS NOTE ADULT - ASSESSMENT
89M PMH HTN, BPH, recently diagnosed DM2, hypothyroidism, and CKD who presents with worsening dyspnea on exertion, found to have hypertensive emergency with pulmonary edema and acute decompensated heart failure, suspect diastolic.    - No neuro issues at present, resume donepezil for history of mild memory loss  - HTN emergency, taper nitroglycerin gtt, titrate for goal SBP<160  - Increase isordil to 20 mg tid, increase hydralazine to 50 mg q8h, wean NTG, continue carvedilol 6.25 bid  - Diuresis with furosemide, decrease to 40 mg bid, continue strict I/O's, keep net negative 1L/24hrs  - Continue aspirin 81, start statin for , A1C 6.3  - TTE with moderate global LV systolic dysfunction, continue medical management, may eventually require cardiac cath  - Improved hypoxia and pulmonary edema, continue diuresis  - DASH consistent carb diet as tolerated  - Mild LIT on CKD, strict I/O's, trend BUN/Cr/K/HCO3  - Observe off abx, no evidence of pneumonia or other infection  - DVT ppx with HSQ  - Continue synthroid and HISS, TSH 3.26, A1C 6.3  - Discussed with patient at bedside  CC time spent: 35 min

## 2018-06-03 NOTE — PROGRESS NOTE ADULT - ASSESSMENT
This is a 89 year old man with a history of hypertension, newly diagnosed diabetes, dementia who presents to the ED with progressive dyspnea with exertion, now with dyspnea at rest, hypertensive emergency, acute decompensated heart failure, pulmonary edema, much improved, echo showing moderate global LV systolic dysfunction:    - Patient improving from a volume standpoint, but still with markedly uncontrolled blood pressures and on nitro gtt  - Hydralazine increased to 50 TID last night.  This can be further increased as the day progresses for BP control  - Increase Isordil to 20 TID now  - Continue to wean nitro gtt as tolerate  - Continue off of ACEi for now given renal function  - ON Lasix 60 IV BID.  Volume status much improved by exam, but likely still not euvolemic.  Continue Lasix with very close monitoring of creatinine, as it trended up today.   - Monitor I, O, K, creatinine closely  - Supplemental oxygen as needed  - Continue carvedilol 6.25 bid.  Heart rate precludes further uptitration  - Aspirin 81 qd  - check fasting lipids  - Monitor and replete lytes  - Hopefully his LV dysfunction related to hypertensive emergency.  I would prefer to repeat the echocardiogram in the future to reassess his LV after medical therapy rather than rushing him to invasive angiography.    - To follow closely.  Patient is critically ill.  Time greater than 35 minutes.  Discussed with daughter at bedside in detail.

## 2018-06-03 NOTE — PROGRESS NOTE ADULT - SUBJECTIVE AND OBJECTIVE BOX
CHIEF COMPLAINT/INTERVAL HISTORY:  Pt. seen and evaluated for hypertensive emergency and acute systolic chf exacerbation.  Pt. is feeling better today.  Respiratory status improving but not back to baseline yet.  Tolerating diuresis.  BP remains elevated.      REVIEW OF SYSTEMS:  No fever, CP, or acute respiratory distress.      Vital Signs Last 24 Hrs  T(C): 36.7 (03 Jun 2018 11:30), Max: 36.9 (02 Jun 2018 19:50)  T(F): 98 (03 Jun 2018 11:30), Max: 98.5 (02 Jun 2018 19:50)  HR: 58 (03 Jun 2018 12:00) (52 - 134)  BP: 191/84 (03 Jun 2018 12:00) (124/76 - 218/95)  BP(mean): 120 (03 Jun 2018 12:00) (24 - 149)  RR: 26 (03 Jun 2018 12:00) (14 - 35)  SpO2: 95% (03 Jun 2018 12:00) (91% - 95%)    PHYSICAL EXAM:  GENERAL: NAD  HEENT: EOMI, hearing normal, conjunctiva and sclera clear  Chest: Diminished BS bilaterally, no wheezing  CV: S1S2, RRR,   GI: soft, +BS, NT/ND  Musculoskeletal: trace LE edema  Psychiatric: affect nL, mood nL  Skin: warm and dry    LABS:                        13.0   10.98 )-----------( 246      ( 03 Jun 2018 05:36 )             38.6     06-03    143  |  99  |  19  ----------------------------<  120<H>  3.1<L>   |  33<H>  |  2.10<H>    Ca    7.9<L>      03 Jun 2018 05:36  Phos  2.6     06-03  Mg     1.7     06-03    TPro  5.6<L>  /  Alb  2.3<L>  /  TBili  0.5  /  DBili  x   /  AST  13<L>  /  ALT  9<L>  /  AlkPhos  63  06-03    PT/INR - ( 02 Jun 2018 05:02 )   PT: 11.0 sec;   INR: 1.01 ratio         PTT - ( 02 Jun 2018 05:02 )  PTT:31.0 sec      Assessment and Plan:  -Hypertensive emergency:  Continue nitro gtt, hydralazine 50mg PO Q8h, Coreg 6.25mg PO Q12h, and Isordil 20mg PO TID.  Management per cardiology and Intensivist  -Acute Systolic CHF exacerbation:  continue Lasix 40mg IV Q12h, Coreg, hydralazine, isordil, and nitro gtt.  No ACEI/ARB given kidney insufficiency  -LIT:  continue to monitor while on diuretics.  -DM:  continue humalog sliding scale  -Hypothyroidism:  continue Synthroid 50mcg PO daily  -BPH:  continue Proscar 5mg PO daily and Flomax 0.4mg PO QHS  -Dementia:  continue Aricept 10mg PO QHS  -VTE ppx:  heparin 5000 units SQ Q8h  -

## 2018-06-03 NOTE — DIETITIAN INITIAL EVALUATION ADULT. - OTHER INFO
Pt A+O at time of visit; forgetful per RN. Dx HTN emergency/CHF. Poor appetite/po intake for breakfast 6/3 secondary nausea. Usually fair per pt. No difficulty chewing/swallowing. Lives at home and prepares foods for self. No added salt diet. GI wdl. Small formed BM 6/3. Pt A+O at time of visit; forgetful per RN. Dx hypertensive emergency/CHF. Poor appetite/po intake for breakfast 6/3 secondary nausea. Usually fair per pt. No difficulty chewing/swallowing. Lives at home and prepares foods for self. No added salt diet pta. GI wdl. Small formed BM 6/3.

## 2018-06-04 DIAGNOSIS — I50.43 ACUTE ON CHRONIC COMBINED SYSTOLIC (CONGESTIVE) AND DIASTOLIC (CONGESTIVE) HEART FAILURE: ICD-10-CM

## 2018-06-04 DIAGNOSIS — N40.0 BENIGN PROSTATIC HYPERPLASIA WITHOUT LOWER URINARY TRACT SYMPTOMS: ICD-10-CM

## 2018-06-04 LAB
ANION GAP SERPL CALC-SCNC: 9 MMOL/L — SIGNIFICANT CHANGE UP (ref 5–17)
BUN SERPL-MCNC: 20 MG/DL — SIGNIFICANT CHANGE UP (ref 7–23)
CALCIUM SERPL-MCNC: 8 MG/DL — LOW (ref 8.5–10.1)
CHLORIDE SERPL-SCNC: 98 MMOL/L — SIGNIFICANT CHANGE UP (ref 96–108)
CO2 SERPL-SCNC: 32 MMOL/L — HIGH (ref 22–31)
CREAT SERPL-MCNC: 2.2 MG/DL — HIGH (ref 0.5–1.3)
GLUCOSE SERPL-MCNC: 115 MG/DL — HIGH (ref 70–99)
HCT VFR BLD CALC: 37.2 % — LOW (ref 39–50)
HGB BLD-MCNC: 12.5 G/DL — LOW (ref 13–17)
MAGNESIUM SERPL-MCNC: 2.1 MG/DL — SIGNIFICANT CHANGE UP (ref 1.6–2.6)
MCHC RBC-ENTMCNC: 27.1 PG — SIGNIFICANT CHANGE UP (ref 27–34)
MCHC RBC-ENTMCNC: 33.6 GM/DL — SIGNIFICANT CHANGE UP (ref 32–36)
MCV RBC AUTO: 80.7 FL — SIGNIFICANT CHANGE UP (ref 80–100)
NRBC # BLD: 0 /100 WBCS — SIGNIFICANT CHANGE UP (ref 0–0)
PHOSPHATE SERPL-MCNC: 2.9 MG/DL — SIGNIFICANT CHANGE UP (ref 2.5–4.5)
PLATELET # BLD AUTO: 243 K/UL — SIGNIFICANT CHANGE UP (ref 150–400)
POTASSIUM SERPL-MCNC: 3.5 MMOL/L — SIGNIFICANT CHANGE UP (ref 3.5–5.3)
POTASSIUM SERPL-SCNC: 3.5 MMOL/L — SIGNIFICANT CHANGE UP (ref 3.5–5.3)
RBC # BLD: 4.61 M/UL — SIGNIFICANT CHANGE UP (ref 4.2–5.8)
RBC # FLD: 15.5 % — HIGH (ref 10.3–14.5)
SODIUM SERPL-SCNC: 139 MMOL/L — SIGNIFICANT CHANGE UP (ref 135–145)
WBC # BLD: 9.32 K/UL — SIGNIFICANT CHANGE UP (ref 3.8–10.5)
WBC # FLD AUTO: 9.32 K/UL — SIGNIFICANT CHANGE UP (ref 3.8–10.5)

## 2018-06-04 PROCEDURE — 99291 CRITICAL CARE FIRST HOUR: CPT

## 2018-06-04 PROCEDURE — 99233 SBSQ HOSP IP/OBS HIGH 50: CPT

## 2018-06-04 PROCEDURE — 99233 SBSQ HOSP IP/OBS HIGH 50: CPT | Mod: GC

## 2018-06-04 RX ORDER — ISOSORBIDE DINITRATE 5 MG/1
30 TABLET ORAL EVERY 8 HOURS
Qty: 0 | Refills: 0 | Status: DISCONTINUED | OUTPATIENT
Start: 2018-06-04 | End: 2018-06-05

## 2018-06-04 RX ORDER — FUROSEMIDE 40 MG
40 TABLET ORAL DAILY
Qty: 0 | Refills: 0 | Status: DISCONTINUED | OUTPATIENT
Start: 2018-06-04 | End: 2018-06-04

## 2018-06-04 RX ORDER — FUROSEMIDE 40 MG
40 TABLET ORAL ONCE
Qty: 0 | Refills: 0 | Status: COMPLETED | OUTPATIENT
Start: 2018-06-04 | End: 2018-06-04

## 2018-06-04 RX ORDER — HYDRALAZINE HCL 50 MG
75 TABLET ORAL EVERY 6 HOURS
Qty: 0 | Refills: 0 | Status: DISCONTINUED | OUTPATIENT
Start: 2018-06-04 | End: 2018-06-05

## 2018-06-04 RX ORDER — POTASSIUM CHLORIDE 20 MEQ
40 PACKET (EA) ORAL ONCE
Qty: 0 | Refills: 0 | Status: COMPLETED | OUTPATIENT
Start: 2018-06-04 | End: 2018-06-04

## 2018-06-04 RX ORDER — ALPRAZOLAM 0.25 MG
0.12 TABLET ORAL ONCE
Qty: 0 | Refills: 0 | Status: DISCONTINUED | OUTPATIENT
Start: 2018-06-04 | End: 2018-06-04

## 2018-06-04 RX ORDER — FUROSEMIDE 40 MG
40 TABLET ORAL EVERY 12 HOURS
Qty: 0 | Refills: 0 | Status: DISCONTINUED | OUTPATIENT
Start: 2018-06-05 | End: 2018-06-05

## 2018-06-04 RX ADMIN — ISOSORBIDE DINITRATE 30 MILLIGRAM(S): 5 TABLET ORAL at 22:11

## 2018-06-04 RX ADMIN — Medication 75 MILLIGRAM(S): at 17:37

## 2018-06-04 RX ADMIN — HEPARIN SODIUM 5000 UNIT(S): 5000 INJECTION INTRAVENOUS; SUBCUTANEOUS at 15:00

## 2018-06-04 RX ADMIN — Medication 1000 UNIT(S): at 12:00

## 2018-06-04 RX ADMIN — Medication 50 MICROGRAM(S): at 05:51

## 2018-06-04 RX ADMIN — ISOSORBIDE DINITRATE 30 MILLIGRAM(S): 5 TABLET ORAL at 14:26

## 2018-06-04 RX ADMIN — Medication 0.12 MILLIGRAM(S): at 20:02

## 2018-06-04 RX ADMIN — CARVEDILOL PHOSPHATE 6.25 MILLIGRAM(S): 80 CAPSULE, EXTENDED RELEASE ORAL at 05:51

## 2018-06-04 RX ADMIN — HEPARIN SODIUM 5000 UNIT(S): 5000 INJECTION INTRAVENOUS; SUBCUTANEOUS at 22:11

## 2018-06-04 RX ADMIN — Medication 40 MILLIEQUIVALENT(S): at 08:29

## 2018-06-04 RX ADMIN — DONEPEZIL HYDROCHLORIDE 10 MILLIGRAM(S): 10 TABLET, FILM COATED ORAL at 22:10

## 2018-06-04 RX ADMIN — CARVEDILOL PHOSPHATE 6.25 MILLIGRAM(S): 80 CAPSULE, EXTENDED RELEASE ORAL at 17:37

## 2018-06-04 RX ADMIN — HEPARIN SODIUM 5000 UNIT(S): 5000 INJECTION INTRAVENOUS; SUBCUTANEOUS at 05:51

## 2018-06-04 RX ADMIN — Medication 40 MILLIGRAM(S): at 14:15

## 2018-06-04 RX ADMIN — Medication 40 MILLIGRAM(S): at 05:51

## 2018-06-04 RX ADMIN — Medication 81 MILLIGRAM(S): at 12:00

## 2018-06-04 RX ADMIN — TAMSULOSIN HYDROCHLORIDE 0.4 MILLIGRAM(S): 0.4 CAPSULE ORAL at 22:10

## 2018-06-04 RX ADMIN — ATORVASTATIN CALCIUM 10 MILLIGRAM(S): 80 TABLET, FILM COATED ORAL at 22:12

## 2018-06-04 RX ADMIN — Medication 40 MILLIEQUIVALENT(S): at 12:14

## 2018-06-04 RX ADMIN — Medication 100 MILLIGRAM(S): at 12:00

## 2018-06-04 RX ADMIN — FINASTERIDE 5 MILLIGRAM(S): 5 TABLET, FILM COATED ORAL at 12:00

## 2018-06-04 RX ADMIN — ISOSORBIDE DINITRATE 20 MILLIGRAM(S): 5 TABLET ORAL at 05:51

## 2018-06-04 RX ADMIN — Medication 75 MILLIGRAM(S): at 05:51

## 2018-06-04 RX ADMIN — Medication 75 MILLIGRAM(S): at 12:14

## 2018-06-04 NOTE — PROGRESS NOTE ADULT - SUBJECTIVE AND OBJECTIVE BOX
Patient is a 89y old  Male who presents with a chief complaint of Hypertensive Emergency, CHF (02 Jun 2018 07:10)    24 hour events: Pt seen and examined at bedside, in NAD. Denies CP, SOB, blurred vision, headaches, N/V/D. No overnight events    REVIEW OF SYSTEMS  Constitutional: No fever, chills, fatigue  Neuro: No headache, numbness, weakness  Resp: No cough, wheezing, shortness of breath  CVS: No chest pain, palpitations, leg swelling  GI: No abdominal pain, nausea, vomiting, diarrhea   : No dysuria, frequency, incontinence  Skin: No itching, burning, rashes, or lesions   Msk: No joint pain or swelling  Psych: No depression, anxiety, mood swings    T(F): 97.8 (06-04-18 @ 12:00), Max: 98.6 (06-03-18 @ 23:40)  HR: 55 (06-04-18 @ 13:00) (52 - 77)  BP: 108/53 (06-04-18 @ 13:00) (104/50 - 201/81)  RR: 22 (06-04-18 @ 13:00) (16 - 54)  SpO2: 96% (06-04-18 @ 13:00) (86% - 97%)  Wt(kg): --        CAPILLARY BLOOD GLUCOSE      I&O's Summary    06-03 @ 07:01  -  06-04 @ 07:00  --------------------------------------------------------  IN: 1314 mL / OUT: 2450 mL / NET: -1136 mL    06-04 @ 07:01  -  06-04 @ 13:26  --------------------------------------------------------  IN: 134 mL / OUT: 300 mL / NET: -166 mL      PHYSICAL EXAM  Gen: NAD; AAOx3  Neuro: CN II-XII grossly intact; moving all extremities   HEENT: NC/AT; EOMI; MMM; +JVD  CV: normal S1 & S2; regular rate and rhythm   Pulm: inspiratory rales at the bases bilaterally  GI: soft; NT/ND  Ext: resolved edema; pulses intact  Skin: warm, well perfused    MEDICATIONS    carvedilol Oral  furosemide   Injectable IV Push  hydrALAZINE Oral  isosorbide   dinitrate Tablet (ISORDIL) Oral  tamsulosin Oral    atorvastatin Oral  dextrose 40% Gel Oral PRN  dextrose 50% Injectable IV Push  dextrose 50% Injectable IV Push  dextrose 50% Injectable IV Push  finasteride Oral  glucagon  Injectable IntraMuscular PRN  insulin lispro (HumaLOG) corrective regimen sliding scale SubCutaneous  levothyroxine Oral      donepezil Oral      aspirin  chewable Oral  heparin  Injectable SubCutaneous    docusate sodium Oral  polyethylene glycol 3350 Oral PRN  senna Oral      cholecalciferol Oral  dextrose 5%. IV Continuous                                12.5   9.32  )-----------( 243      ( 04 Jun 2018 06:34 )             37.2       06-04    139  |  98  |  20  ----------------------------<  115<H>  3.5   |  32<H>  |  2.20<H>    Ca    8.0<L>      04 Jun 2018 06:34  Phos  2.9     06-04  Mg     2.1     06-04    TPro  5.6<L>  /  Alb  2.3<L>  /  TBili  0.5  /  DBili  x   /  AST  13<L>  /  ALT  9<L>  /  AlkPhos  63  06-03              TTE (6/2): moderate global LV systolic dysfunction, mild AI, mild to moderate MR, mild TR, mild LAE, grade 2 diastolic dysfunction, no pericardial effusion    CENTRAL LINE: N    ALLISON: N    A-LINE: N    GLOBAL ISSUE/BEST PRACTICE:  Analgesia: n/a  Sedation: n/a  HOB elevation: yes  Stress ulcer prophylaxis: n/a  VTE prophylaxis: yes  Glycemic control: yes  Nutrition: yes    CODE STATUS: full Patient is a 89y old  Male who presents with a chief complaint of Hypertensive Emergency, CHF (02 Jun 2018 07:10)    24 hour events: Pt seen and examined at bedside, in NAD. Denies CP, SOB, blurred vision, headaches, N/V/D. No overnight events    REVIEW OF SYSTEMS  Constitutional: No fever, chills, fatigue  Neuro: No headache, numbness, weakness  Resp: No cough, wheezing, shortness of breath  CVS: No chest pain, palpitations, leg swelling  GI: No abdominal pain, nausea, vomiting, diarrhea   : No dysuria, frequency, incontinence  Skin: No itching, burning, rashes, or lesions   Msk: No joint pain or swelling  Psych: No depression, anxiety, mood swings    T(F): 97.8 (06-04-18 @ 12:00), Max: 98.6 (06-03-18 @ 23:40)  HR: 55 (06-04-18 @ 13:00) (52 - 77)  BP: 108/53 (06-04-18 @ 13:00) (104/50 - 201/81)  RR: 22 (06-04-18 @ 13:00) (16 - 54)  SpO2: 96% (06-04-18 @ 13:00) (86% - 97%)  Wt(kg): --        CAPILLARY BLOOD GLUCOSE      I&O's Summary    06-03 @ 07:01  -  06-04 @ 07:00  --------------------------------------------------------  IN: 1314 mL / OUT: 2450 mL / NET: -1136 mL    06-04 @ 07:01  -  06-04 @ 13:26  --------------------------------------------------------  IN: 134 mL / OUT: 300 mL / NET: -166 mL      PHYSICAL EXAM  Gen: NAD; AAOx3  Neuro: CN II-XII grossly intact; moving all extremities   HEENT: NC/AT; EOMI; MMM; +JVD  CV: normal S1 & S2; regular rate and rhythm   Pulm: inspiratory rales at the bases bilaterally  GI: soft; NT/ND  Ext: resolved edema; pulses intact  Skin: warm, well perfused    MEDICATIONS    carvedilol Oral  furosemide   Injectable IV Push  hydrALAZINE Oral  isosorbide   dinitrate Tablet (ISORDIL) Oral  tamsulosin Oral    atorvastatin Oral  dextrose 40% Gel Oral PRN  dextrose 50% Injectable IV Push  dextrose 50% Injectable IV Push  dextrose 50% Injectable IV Push  finasteride Oral  glucagon  Injectable IntraMuscular PRN  insulin lispro (HumaLOG) corrective regimen sliding scale SubCutaneous  levothyroxine Oral      donepezil Oral      aspirin  chewable Oral  heparin  Injectable SubCutaneous    docusate sodium Oral  polyethylene glycol 3350 Oral PRN  senna Oral      cholecalciferol Oral  dextrose 5%. IV Continuous                                12.5   9.32  )-----------( 243      ( 04 Jun 2018 06:34 )             37.2       06-04    139  |  98  |  20  ----------------------------<  115<H>  3.5   |  32<H>  |  2.20<H>    Ca    8.0<L>      04 Jun 2018 06:34  Phos  2.9     06-04  Mg     2.1     06-04    TPro  5.6<L>  /  Alb  2.3<L>  /  TBili  0.5  /  DBili  x   /  AST  13<L>  /  ALT  9<L>  /  AlkPhos  63  06-03        TTE (6/2): moderate global LV systolic dysfunction, mild AI, mild to moderate MR, mild TR, mild LAE, grade 2 diastolic dysfunction, no pericardial effusion    CENTRAL LINE: N    ALLISON: N    A-LINE: N    GLOBAL ISSUE/BEST PRACTICE:  Analgesia: n/a  Sedation: n/a  HOB elevation: yes  Stress ulcer prophylaxis: n/a  VTE prophylaxis: yes  Glycemic control: yes  Nutrition: yes    CODE STATUS: full

## 2018-06-04 NOTE — PROGRESS NOTE ADULT - SUBJECTIVE AND OBJECTIVE BOX
Brief Hospital Course:   6/2: 89-year-old male with a history of HTN, BPH, recently diagnosed DM2, hypothyroidism, and CKD who presents with worsening dyspnea on exertion, admitted to MICU with hypertensive emergency with pulmonary edema and acute decompensated heart failure, suspect diastolic.    Significant recent/past 24 hr events: improved, nitroglycerin weaned off this morning after oral meds up titrated (hydralazine, isosorbide)    Subjective: no c/o currently. Denies CP, SOB, HA, dizziness, N/V, other c/o.    Review of Systems       ROS negative x 10 systems except as noted above      Patient is a 89y old  Male who presents with a chief complaint of Hypertensive Emergency, CHF (02 Jun 2018 07:10)    HPI:  89 yo M with PMH of Dementia, HTN, BPH, Pancreatic Cancer, Bladder Cancer, JASMYN, Hypothyroidism, recently diagnosed DM, Renal Cyst presented to the ED with SOB while at rest. Hx obtained from patient and daughter at bedside. Symptoms started for the first time a few days ago and resolved on its own. At baseline, he lives alone and is able to complete all of his ADLs and does not use home O2. As per patient he is short of breath at rest- worst when he wakes up. Today patient was very short of breath upon waking up and called his daughter who brought him in. As per daughter patient has been following up with his PMD and Oncologist who have noted his blood pressure to be elevated (180-190 systolic) over the last few weeks. Noted to have LE edema, PMD thought it may be due to amlodipine which was d/cd and ACEi dose was doubled. However symptoms persisted despite medication change. Denies waking up at night due to SOB, or cough. Denies recent illness, sick contacts, fevers, chills.     In the ED, patient was hypertensive (-232/), otherwise hemodynamically stable. In the ED, labs significant for elevated D-Dimer of 442, hypokalemia (K 3.3), elevated Cr (1.9, baseline Cr of 1.1-1.2 per chart review), decreased eGFR (30, baseline of 53-59), elevated trop (.100), and elevated pro-BNP (36319). CT Head negative for acute intracranial hemorrhage, mass effect or evidence of acute territorial infarct. CXr showed a new focal airspace   infiltrate at the right base presumptive pneumonia, a small right parapneumonic effusion noted, a small left pleural effusion. EKG showed NSR @ 64 BPM w/ signs of LVH. Received ASA 325mg PO x1, Lasix 40mg IV x1, Hydralazine 10mg IV x1, Labetalol 20mg IV x1, KCl 40 mEq PO x1. Started on Nitroglycerin infusion. 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  H/O elbow surgery: As a child  H/O colonoscopy  H/O cystoscopy: Bladder Cancer 2013  H/O hernia repair: Inguinal Hernia surgery many years ago    FAMILY HISTORY:  Family history of breast cancer (Sibling)      Vitals   ICU Vital Signs Last 24 Hrs  T(C): 36.6 (04 Jun 2018 20:45), Max: 37 (03 Jun 2018 23:40)  T(F): 97.8 (04 Jun 2018 20:45), Max: 98.6 (03 Jun 2018 23:40)  HR: 55 (04 Jun 2018 22:00) (53 - 81), SB/SR  BP: 157/70 (04 Jun 2018 22:00) (97/51 - 193/81)  BP(mean): 100 (04 Jun 2018 22:00) (68 - 141)  RR: 19 (04 Jun 2018 22:00) (16 - 54)  SpO2: 92% (04 Jun 2018 22:00) (87% - 98%) on 4L NC      I&O's Detail    03 Jun 2018 07:01  -  04 Jun 2018 07:00  --------------------------------------------------------  Total IN: 1314 mL  Total OUT: 2450 mL  Total NET: -1136 mL    04 Jun 2018 07:01  -  04 Jun 2018 23:13  --------------------------------------------------------  IN:    nitroglycerin  Infusion: 14 mL    Oral Fluid: 120 mL  Total IN: 134 mL    OUT:    Voided: 450 mL  Total OUT: 450 mL    Total NET: -316 mL      LABS                        12.5   9.32  )-----------( 243      ( 04 Jun 2018 06:34 )             37.2     06-04    139  |  98  |  20  ----------------------------<  115<H>  3.5   |  32<H>  |  2.20<H>    Ca    8.0<L>      04 Jun 2018 06:34  Phos  2.9     06-04  Mg     2.1     06-04    POCT Blood Glucose.: 105 mg/dL (06-04-18 @ 21:58)  POCT Blood Glucose.: 110 mg/dL (06-04-18 @ 17:35)  POCT Blood Glucose.: 117 mg/dL (06-04-18 @ 12:30)  POCT Blood Glucose.: 125 mg/dL (06-04-18 @ 08:31)      < from: TTE Echo Doppler w/o Cont (06.02.18 @ 11:39) >  FINDINGS  Left Ventricle: Eccentric left ventricular hypertrophy.  Moderate global left ventricular systolic dysfunction.  Aortic Valve: Thickened trileaflet aortic valve.  Mild aortic insufficiency.  Mitral Valve: Thickened mitral valve.  Mild to moderate mitral insufficiency.  Tricuspid Valve: Normal tricuspid valve.  Mild tricuspid insufficiency.  Pulmonic Valve: Not well visualized.  Left Atrium: Mildly enlarged  Right Ventricle: Normal right ventricular size and function  Right Atrium: Normal.  Diastolic Function: Grade 2 diastolic dysfunction  Pericardium/Pleura: Normal pericardium with no pericardial effusion.  Aortic root:  Mildly dilated aortic root measuring 4.4 cm at the sinuses of valsalva.  < end of copied text >      MEDICATIONS  (STANDING):  aspirin  chewable 81 milliGRAM(s) Oral daily  atorvastatin 10 milliGRAM(s) Oral at bedtime  carvedilol 6.25 milliGRAM(s) Oral every 12 hours  cholecalciferol 1000 Unit(s) Oral daily  docusate sodium 100 milliGRAM(s) Oral daily  donepezil 10 milliGRAM(s) Oral at bedtime  finasteride 5 milliGRAM(s) Oral daily  heparin  Injectable 5000 Unit(s) SubCutaneous every 8 hours  hydrALAZINE 75 milliGRAM(s) Oral every 6 hours  insulin lispro (HumaLOG) corrective regimen sliding scale   SubCutaneous Before meals and at bedtime  isosorbide   dinitrate Tablet (ISORDIL) 30 milliGRAM(s) Oral every 8 hours  levothyroxine 50 MICROGram(s) Oral daily  senna 2 Tablet(s) Oral at bedtime  tamsulosin 0.4 milliGRAM(s) Oral at bedtime    MEDICATIONS  (PRN):  dextrose 40% Gel 15 Gram(s) Oral once PRN Blood Glucose LESS THAN 70 milliGRAM(s)/deciliter  glucagon  Injectable 1 milliGRAM(s) IntraMuscular once PRN Glucose LESS THAN 70 milligrams/deciliter  polyethylene glycol 3350 17 Gram(s) Oral daily PRN Constipation    No Known Allergies      Physical Exam:   Constitutional: NAD, well-groomed, well-developed  HEENT: PERRLA, EOMI, no drainage or redness  Neck: No bruits; no thyromegaly or nodules,  No JVD  Back: Normal spine flexure, No CVA tenderness, No deformity or limitation of movement  Respiratory: Breath Sounds equal & clear bilaterally to auscultation, no accessory muscle use noted  Cardiovascular: Regular rate, regular rhythm, normal S1, S2; no murmurs or rub  Gastrointestinal: Soft, non-tender, non distended, no hepatosplenomegaly, normal bowel sounds  Extremities: BURCH x 4, no peripheral edema, no cyanosis, no clubbing   Vascular: Equal and normal pulses: 2+ peripheral pulses throughout  Neurological: A+O x 2-3; speech clear and intact; no sensory, motor  deficits, normal reflexes  Psychiatric: calm, normal mood, normal affect  Musculoskeletal: No joint swelling or deformity; no limitation of movement  Skin: warm, dry, well perfused, no rashes      Code Status: full code

## 2018-06-04 NOTE — PROGRESS NOTE ADULT - ASSESSMENT
89 year old man with a history of hypertension, newly diagnosed diabetes, dementia who presents to the ED with progressive dyspnea with exertion, now with dyspnea at rest, hypertensive emergency, acute decompensated heart failure, pulmonary edema, much improved, echo showing moderate global LV systolic dysfunction:    - Patient improving from a volume standpoint, but still with markedly uncontrolled blood pressures ast times and he remains on nitro gtt  - Hydralazine increased to 75 TID last night.    - Increase Isordil to 20 TID now  - Continue to wean nitro gtt as tolerated by bp  - Continue off of ACEi for now given renal function  - his hr is slow and his renal function is abnormal, which are both limiting the choice of bp meds  - would consider the use of clonidine, which may reduce the hr, but might not  - ON Lasix 40 IV BID, and his volume status seems improved.  Would consider changing to po tomorrow, pending his clinical course  - Monitor I, O, K, creatinine closely  - Supplemental oxygen as needed  - Continue carvedilol 6.25 bid.     - Aspirin 81 qd  - Monitor and replete lytes  - Hopefully his LV dysfunction related to hypertension.  I would prefer to repeat the echocardiogram in the future to reassess his LV after medical therapy rather than rushing him to invasive angiography, especially noting his age and renal function    The patient is at risk of abrupt decompensation.  35 minutes of critical care time was spent with this patient.

## 2018-06-04 NOTE — PROGRESS NOTE ADULT - ASSESSMENT
89-year-old male with a history of HTN, BPH, recently diagnosed DM2, hypothyroidism, and CKD who presents with worsening dyspnea on exertion, admitted to MICU with hypertensive emergency with pulmonary edema and acute decompensated heart failure, LIT.

## 2018-06-04 NOTE — PROGRESS NOTE ADULT - ASSESSMENT
89M PMH HTN, BPH, recently diagnosed DM2, hypothyroidism, and CKD who presents with worsening dyspnea on exertion, found to have hypertensive emergency with pulmonary edema and acute decompensated heart failure, diastolic and systolic dysfunction.    - Neuro: No neuro issues at present, continue donepezil for history of mild memory loss  - CVS: HTN emergency,  nitroglycerin gtt discontinued, will monitor off of drip, Increase hydralazine to 75mg q6, isodril 30mg q8. Goal SBP<160. Discontinue Coreg as pt HR is in 50s. Continue diuresis, Decrease Lasix to 40mg IV qd. strict I/O's, keep net negative 1L/24hrs. Continue aspirin 81, continue statin for , A1C 6.3.  - TTE with moderate global LV systolic dysfunction, continue medical management, may eventually require cardiac cath  - Improved hypoxia and pulmonary edema, continue diuresis  - DASH consistent carb diet as tolerated  - Renal: Mild LIT on CKD, strict I/O's, trend BUN/Cr/K/HCO3  - Pulm: Observe off abx, no evidence of pneumonia or other infection  - Heme: DVT ppx with HSQ  - Endo: Continue synthroid and HISS, TSH 3.26, A1C 6.3  - Discussed with patient and family at bedside  - downgrade to Tele later today

## 2018-06-04 NOTE — PROGRESS NOTE ADULT - SUBJECTIVE AND OBJECTIVE BOX
[facility]    Michael Bernal, [place], [place], [place], [place], [place]      [phone]    CHIEF COMPLAINT: Patient is a 89y old  Male who presents with a chief complaint of Hypertensive Emergency, CHF (02 Jun 2018 07:10)      Follow Up: htn, lv dysfunction, vol ol    Interim history: The patient reports no new symptoms.  Denies chest discomfort and shortness of breath.  No abdominal pain.  No new neurologic symptoms.      MEDICATIONS  (STANDING):  aspirin  chewable 81 milliGRAM(s) Oral daily  atorvastatin 10 milliGRAM(s) Oral at bedtime  carvedilol 6.25 milliGRAM(s) Oral every 12 hours  cholecalciferol 1000 Unit(s) Oral daily  dextrose 5%. 1000 milliLiter(s) (50 mL/Hr) IV Continuous <Continuous>  dextrose 50% Injectable 12.5 Gram(s) IV Push once  dextrose 50% Injectable 25 Gram(s) IV Push once  dextrose 50% Injectable 25 Gram(s) IV Push once  docusate sodium 100 milliGRAM(s) Oral daily  donepezil 10 milliGRAM(s) Oral at bedtime  finasteride 5 milliGRAM(s) Oral daily  furosemide   Injectable 40 milliGRAM(s) IV Push every 12 hours  heparin  Injectable 5000 Unit(s) SubCutaneous every 8 hours  hydrALAZINE 75 milliGRAM(s) Oral every 8 hours  insulin lispro (HumaLOG) corrective regimen sliding scale   SubCutaneous Before meals and at bedtime  isosorbide   dinitrate Tablet (ISORDIL) 20 milliGRAM(s) Oral three times a day  levothyroxine 50 MICROGram(s) Oral daily  nitroglycerin  Infusion 10 MICROgram(s)/Min (3 mL/Hr) IV Continuous <Continuous>  senna 2 Tablet(s) Oral at bedtime  tamsulosin 0.4 milliGRAM(s) Oral at bedtime    MEDICATIONS  (PRN):  dextrose 40% Gel 15 Gram(s) Oral once PRN Blood Glucose LESS THAN 70 milliGRAM(s)/deciliter  glucagon  Injectable 1 milliGRAM(s) IntraMuscular once PRN Glucose LESS THAN 70 milligrams/deciliter  polyethylene glycol 3350 17 Gram(s) Oral daily PRN Constipation      REVIEW OF SYSTEMS:  eye, ent, GI, , allergic, dermatologic, musculoskeletal and neurologic are negative except as described above    Vital Signs Last 24 Hrs  T(C): 36.8 (04 Jun 2018 07:30), Max: 37 (03 Jun 2018 23:40)  T(F): 98.2 (04 Jun 2018 07:30), Max: 98.6 (03 Jun 2018 23:40)  HR: 68 (04 Jun 2018 07:00) (52 - 134)  BP: 171/92 (04 Jun 2018 07:00) (104/50 - 203/84)  BP(mean): 116 (04 Jun 2018 07:00) (24 - 162)  RR: 38 (04 Jun 2018 07:00) (16 - 54)  SpO2: 93% (04 Jun 2018 07:00) (86% - 97%)    I&O's Summary    03 Jun 2018 07:01  -  04 Jun 2018 07:00  --------------------------------------------------------  IN: 1314 mL / OUT: 2450 mL / NET: -1136 mL        Telemetry past 24h: sr brief svt,  brief af    PHYSICAL EXAM:    Constitutional: well-nourished, well-developed, NAD   HEENT:  MMM, sclerae anicteric, conjunctivae clear, no oral cyanosis.  Pulmonary: Non-labored, breath sounds are clear bilaterally, No wheezing, rales or rhonchi  Cardiovascular: Regular, S1 and S2.  No murmur.  No rubs, gallops or clicks  Gastrointestinal: Bowel Sounds present, soft, nontender.   Lymph: No peripheral edema.   Neurological: Alert, no focal deficits  Skin: No rashes.  Psych:  Mood & affect appropriate    LABS: All Labs Reviewed:                        12.5   9.32  )-----------( 243      ( 04 Jun 2018 06:34 )             37.2                         13.0   10.98 )-----------( 246      ( 03 Jun 2018 05:36 )             38.6                         14.0   10.36 )-----------( 241      ( 02 Jun 2018 05:02 )             42.7     04 Jun 2018 06:34    139    |  98     |  20     ----------------------------<  115    3.5     |  32     |  2.20   03 Jun 2018 16:28    137    |  97     |  19     ----------------------------<  131    3.6     |  32     |  2.00   03 Jun 2018 05:36    143    |  99     |  19     ----------------------------<  120    3.1     |  33     |  2.10     Ca    8.0        04 Jun 2018 06:34  Ca    7.9        03 Jun 2018 16:28  Ca    7.9        03 Jun 2018 05:36  Phos  2.9       04 Jun 2018 06:34  Phos  2.5       03 Jun 2018 16:28  Phos  2.6       03 Jun 2018 05:36  Mg     2.1       04 Jun 2018 06:34  Mg     2.2       03 Jun 2018 16:28  Mg     1.7       03 Jun 2018 05:36    TPro  5.6    /  Alb  2.3    /  TBili  0.5    /  DBili  x      /  AST  13     /  ALT  9      /  AlkPhos  63     03 Jun 2018 05:36  TPro  6.2    /  Alb  2.5    /  TBili  0.4    /  DBili  x      /  AST  15     /  ALT  13     /  AlkPhos  68     02 Jun 2018 05:02      CARDIAC MARKERS ( 02 Jun 2018 12:02 )  .097 ng/mL / x     / 40 U/L / x     / 1.8 ng/mL      Blood Culture:   06-02 @ 05:02  Pro Bnp [id number]    06-03 @ 05:36  TSH: 3.26      RADIOLOGY:    EKG:    Echo:

## 2018-06-04 NOTE — PROGRESS NOTE ADULT - ATTENDING COMMENTS
89M PMH HTN, BPH, recently diagnosed DM2, hypothyroidism, and CKD who presents with worsening dyspnea on exertion, found to have hypertensive emergency with pulmonary edema and acute decompensated systolic heart failure.    - Appears to have mild dementia/memory loss, continue donepezil  - Weaned off nitroglycerin this AM. Increase isordil to 30 mg tid, increase hydralazine to 75 mg q6h. Continue carvedilol with hold parameters  - Diuresis with furosemide, s/p 40 mg IV twice today, change to 40 mg oral q12h tomorrow. Continue strict I/O's, keep net negative 0.5-1L/24hrs  - Continue aspirin and statin,   - TTE with moderate global LV systolic dysfunction, continue medical management, may eventually require cardiac cath  - Improved hypoxia and pulmonary edema, continue diuresis. Outpatient sleep study for nocturnal hypoxia, possible JASMYN vs. cheyne villalobos 2/2 CHF  - DASH consistent carb diet as tolerated, speech & swallow eval for possible aspiration with pills  - Mild LIT on CKD, strict I/O's, trend BUN/Cr/K/HCO3  - Observe off abx, no evidence of pneumonia or other infection  - DVT ppx with HSQ  - Continue synthroid and HISS, TSH 3.26, A1C 6.3  - Discussed with patient and family at bedside  - Stable for transfer to floors

## 2018-06-04 NOTE — PROGRESS NOTE ADULT - SUBJECTIVE AND OBJECTIVE BOX
CHIEF COMPLAINT/INTERVAL HISTORY:  Pt. seen and evaluated for hypertensive emergency and acute systolic chf exacerbation.  Pt. now off nitro gtt.  In no distress.  Continues to reports some SOB.  Although overall improved.  Tolerating IV diuretic.      REVIEW OF SYSTEMS:  No fever, CP, or abdominal pain.      Vital Signs Last 24 Hrs  T(C): 36.6 (04 Jun 2018 12:00), Max: 37 (03 Jun 2018 23:40)  T(F): 97.8 (04 Jun 2018 12:00), Max: 98.6 (03 Jun 2018 23:40)  HR: 55 (04 Jun 2018 13:00) (52 - 75)  BP: 108/53 (04 Jun 2018 13:00) (104/50 - 201/81)  BP(mean): 76 (04 Jun 2018 13:00) (72 - 162)  RR: 22 (04 Jun 2018 13:00) (16 - 54)  SpO2: 96% (04 Jun 2018 13:00) (86% - 97%)    PHYSICAL EXAM:  GENERAL: NAD  HEENT: EOMI, hearing normal, conjunctiva and sclera clear  Chest: CTA bilaterally, no wheezing  CV: S1S2, RRR,   GI: soft, +BS, NT/ND  Musculoskeletal: no edema  Psychiatric: affect nL, mood nL  Skin: warm and dry    LABS:                        12.5   9.32  )-----------( 243      ( 04 Jun 2018 06:34 )             37.2     06-04    139  |  98  |  20  ----------------------------<  115<H>  3.5   |  32<H>  |  2.20<H>    Ca    8.0<L>      04 Jun 2018 06:34  Phos  2.9     06-04  Mg     2.1     06-04    TPro  5.6<L>  /  Alb  2.3<L>  /  TBili  0.5  /  DBili  x   /  AST  13<L>  /  ALT  9<L>  /  AlkPhos  63  06-03          Assessment and Plan:  -Hypertensive emergency:  Now off nitro gtt.   Continue hydralazine 75mg PO Q6h, Coreg 6.25mg PO Q12h, and Isordil 30mg PO Q8h.  Cardiology and Intensivist f/u  -Acute Systolic CHF exacerbation:  continue Lasix 40mg IV daily, Coreg, hydralazine, and isordil.  No ACEI/ARB given kidney insufficiency  -LIT:  continue to monitor while on diuretics.  -DM:  continue humalog sliding scale  -Hypothyroidism:  continue Synthroid 50mcg PO daily  -BPH:  continue Proscar 5mg PO daily and Flomax 0.4mg PO QHS  -Dementia:  continue Aricept 10mg PO QHS  -VTE ppx:  heparin 5000 units SQ Q8h

## 2018-06-05 LAB
ALBUMIN SERPL ELPH-MCNC: 2.3 G/DL — LOW (ref 3.3–5)
ALP SERPL-CCNC: 61 U/L — SIGNIFICANT CHANGE UP (ref 40–120)
ALT FLD-CCNC: 12 U/L — SIGNIFICANT CHANGE UP (ref 12–78)
ANION GAP SERPL CALC-SCNC: 9 MMOL/L — SIGNIFICANT CHANGE UP (ref 5–17)
APTT BLD: 31 SEC — SIGNIFICANT CHANGE UP (ref 27.5–37.4)
AST SERPL-CCNC: 13 U/L — LOW (ref 15–37)
BASOPHILS # BLD AUTO: 0.06 K/UL — SIGNIFICANT CHANGE UP (ref 0–0.2)
BASOPHILS NFR BLD AUTO: 0.6 % — SIGNIFICANT CHANGE UP (ref 0–2)
BILIRUB SERPL-MCNC: 0.6 MG/DL — SIGNIFICANT CHANGE UP (ref 0.2–1.2)
BUN SERPL-MCNC: 24 MG/DL — HIGH (ref 7–23)
CALCIUM SERPL-MCNC: 8.7 MG/DL — SIGNIFICANT CHANGE UP (ref 8.5–10.1)
CHLORIDE SERPL-SCNC: 98 MMOL/L — SIGNIFICANT CHANGE UP (ref 96–108)
CO2 SERPL-SCNC: 33 MMOL/L — HIGH (ref 22–31)
CREAT SERPL-MCNC: 2.7 MG/DL — HIGH (ref 0.5–1.3)
EOSINOPHIL # BLD AUTO: 0.24 K/UL — SIGNIFICANT CHANGE UP (ref 0–0.5)
EOSINOPHIL NFR BLD AUTO: 2.6 % — SIGNIFICANT CHANGE UP (ref 0–6)
GLUCOSE SERPL-MCNC: 104 MG/DL — HIGH (ref 70–99)
HCT VFR BLD CALC: 40.9 % — SIGNIFICANT CHANGE UP (ref 39–50)
HGB BLD-MCNC: 13.6 G/DL — SIGNIFICANT CHANGE UP (ref 13–17)
IMM GRANULOCYTES NFR BLD AUTO: 0.2 % — SIGNIFICANT CHANGE UP (ref 0–1.5)
INR BLD: 1.04 RATIO — SIGNIFICANT CHANGE UP (ref 0.88–1.16)
LYMPHOCYTES # BLD AUTO: 3.31 K/UL — HIGH (ref 1–3.3)
LYMPHOCYTES # BLD AUTO: 35.8 % — SIGNIFICANT CHANGE UP (ref 13–44)
MAGNESIUM SERPL-MCNC: 2.3 MG/DL — SIGNIFICANT CHANGE UP (ref 1.6–2.6)
MCHC RBC-ENTMCNC: 27.1 PG — SIGNIFICANT CHANGE UP (ref 27–34)
MCHC RBC-ENTMCNC: 33.3 GM/DL — SIGNIFICANT CHANGE UP (ref 32–36)
MCV RBC AUTO: 81.6 FL — SIGNIFICANT CHANGE UP (ref 80–100)
MONOCYTES # BLD AUTO: 1.07 K/UL — HIGH (ref 0–0.9)
MONOCYTES NFR BLD AUTO: 11.6 % — SIGNIFICANT CHANGE UP (ref 2–14)
NEUTROPHILS # BLD AUTO: 4.55 K/UL — SIGNIFICANT CHANGE UP (ref 1.8–7.4)
NEUTROPHILS NFR BLD AUTO: 49.2 % — SIGNIFICANT CHANGE UP (ref 43–77)
PHOSPHATE SERPL-MCNC: 3.1 MG/DL — SIGNIFICANT CHANGE UP (ref 2.5–4.5)
PLATELET # BLD AUTO: 257 K/UL — SIGNIFICANT CHANGE UP (ref 150–400)
POTASSIUM SERPL-MCNC: 3.9 MMOL/L — SIGNIFICANT CHANGE UP (ref 3.5–5.3)
POTASSIUM SERPL-SCNC: 3.9 MMOL/L — SIGNIFICANT CHANGE UP (ref 3.5–5.3)
PROT SERPL-MCNC: 5.9 G/DL — LOW (ref 6–8.3)
PROTHROM AB SERPL-ACNC: 11.3 SEC — SIGNIFICANT CHANGE UP (ref 9.8–12.7)
RBC # BLD: 5.01 M/UL — SIGNIFICANT CHANGE UP (ref 4.2–5.8)
RBC # FLD: 15.7 % — HIGH (ref 10.3–14.5)
SODIUM SERPL-SCNC: 140 MMOL/L — SIGNIFICANT CHANGE UP (ref 135–145)
WBC # BLD: 9.25 K/UL — SIGNIFICANT CHANGE UP (ref 3.8–10.5)
WBC # FLD AUTO: 9.25 K/UL — SIGNIFICANT CHANGE UP (ref 3.8–10.5)

## 2018-06-05 PROCEDURE — 99233 SBSQ HOSP IP/OBS HIGH 50: CPT

## 2018-06-05 PROCEDURE — 76775 US EXAM ABDO BACK WALL LIM: CPT | Mod: 26

## 2018-06-05 RX ORDER — ISOSORBIDE MONONITRATE 60 MG/1
120 TABLET, EXTENDED RELEASE ORAL ONCE
Qty: 0 | Refills: 0 | Status: COMPLETED | OUTPATIENT
Start: 2018-06-05 | End: 2018-06-05

## 2018-06-05 RX ORDER — HYDRALAZINE HCL 50 MG
100 TABLET ORAL EVERY 8 HOURS
Qty: 0 | Refills: 0 | Status: DISCONTINUED | OUTPATIENT
Start: 2018-06-05 | End: 2018-06-06

## 2018-06-05 RX ORDER — OLANZAPINE 15 MG/1
2.5 TABLET, FILM COATED ORAL ONCE
Qty: 0 | Refills: 0 | Status: COMPLETED | OUTPATIENT
Start: 2018-06-05 | End: 2018-06-05

## 2018-06-05 RX ORDER — ISOSORBIDE MONONITRATE 60 MG/1
120 TABLET, EXTENDED RELEASE ORAL DAILY
Qty: 0 | Refills: 0 | Status: DISCONTINUED | OUTPATIENT
Start: 2018-06-06 | End: 2018-06-06

## 2018-06-05 RX ADMIN — Medication 75 MILLIGRAM(S): at 00:34

## 2018-06-05 RX ADMIN — Medication 100 MILLIGRAM(S): at 22:16

## 2018-06-05 RX ADMIN — ISOSORBIDE MONONITRATE 120 MILLIGRAM(S): 60 TABLET, EXTENDED RELEASE ORAL at 12:11

## 2018-06-05 RX ADMIN — Medication 100 MILLIGRAM(S): at 14:08

## 2018-06-05 RX ADMIN — Medication 40 MILLIGRAM(S): at 06:16

## 2018-06-05 RX ADMIN — HEPARIN SODIUM 5000 UNIT(S): 5000 INJECTION INTRAVENOUS; SUBCUTANEOUS at 14:08

## 2018-06-05 RX ADMIN — Medication 1: at 12:11

## 2018-06-05 RX ADMIN — TAMSULOSIN HYDROCHLORIDE 0.4 MILLIGRAM(S): 0.4 CAPSULE ORAL at 22:16

## 2018-06-05 RX ADMIN — DONEPEZIL HYDROCHLORIDE 10 MILLIGRAM(S): 10 TABLET, FILM COATED ORAL at 22:16

## 2018-06-05 RX ADMIN — FINASTERIDE 5 MILLIGRAM(S): 5 TABLET, FILM COATED ORAL at 12:11

## 2018-06-05 RX ADMIN — Medication 100 MILLIGRAM(S): at 12:10

## 2018-06-05 RX ADMIN — SENNA PLUS 2 TABLET(S): 8.6 TABLET ORAL at 22:17

## 2018-06-05 RX ADMIN — Medication 50 MICROGRAM(S): at 06:16

## 2018-06-05 RX ADMIN — CARVEDILOL PHOSPHATE 6.25 MILLIGRAM(S): 80 CAPSULE, EXTENDED RELEASE ORAL at 17:41

## 2018-06-05 RX ADMIN — OLANZAPINE 2.5 MILLIGRAM(S): 15 TABLET, FILM COATED ORAL at 02:18

## 2018-06-05 RX ADMIN — Medication 1000 UNIT(S): at 12:11

## 2018-06-05 RX ADMIN — ATORVASTATIN CALCIUM 10 MILLIGRAM(S): 80 TABLET, FILM COATED ORAL at 22:16

## 2018-06-05 RX ADMIN — ISOSORBIDE DINITRATE 30 MILLIGRAM(S): 5 TABLET ORAL at 06:16

## 2018-06-05 RX ADMIN — HEPARIN SODIUM 5000 UNIT(S): 5000 INJECTION INTRAVENOUS; SUBCUTANEOUS at 06:16

## 2018-06-05 RX ADMIN — Medication 81 MILLIGRAM(S): at 12:10

## 2018-06-05 RX ADMIN — Medication 75 MILLIGRAM(S): at 06:20

## 2018-06-05 RX ADMIN — HEPARIN SODIUM 5000 UNIT(S): 5000 INJECTION INTRAVENOUS; SUBCUTANEOUS at 22:15

## 2018-06-05 NOTE — PHYSICAL THERAPY INITIAL EVALUATION ADULT - PERTINENT HX OF CURRENT PROBLEM, REHAB EVAL
89 y/o male adm 6/2 with hypertensive emergency and CHF. Renal U/S: no hydronephrosis, polycystic kidneys. CT head: negative.

## 2018-06-05 NOTE — PROGRESS NOTE ADULT - SUBJECTIVE AND OBJECTIVE BOX
CHIEF COMPLAINT/INTERVAL HISTORY:  Pt. seen and evaluated for hypertensive emergency and acute systolic and diastolic CHF exacerbation.  Pt. is lying flat in bed in no distress.  Currently denies having any SOB.  Tolerating diuresis.      REVIEW OF SYSTEMS:  No fever, CP, SOB, or abdominal pain.      Vital Signs Last 24 Hrs  T(C): 36.9 (05 Jun 2018 07:00), Max: 37.1 (05 Jun 2018 05:27)  T(F): 98.4 (05 Jun 2018 07:00), Max: 98.8 (05 Jun 2018 05:27)  HR: 56 (05 Jun 2018 07:00) (55 - 81)  BP: 161/58 (05 Jun 2018 07:00) (97/51 - 190/80)  BP(mean): 89 (04 Jun 2018 23:00) (68 - 124)  RR: 16 (05 Jun 2018 07:00) (16 - 51)  SpO2: 94% (05 Jun 2018 07:00) (92% - 98%)    PHYSICAL EXAM:  GENERAL: NAD  HEENT: EOMI, hearing normal, conjunctiva and sclera clear  Chest: CTA bilaterally, no wheezing  CV: S1S2, RRR,   GI: soft, +BS, NT/ND  Musculoskeletal: no edema  Psychiatric: affect nL, mood nL  Skin: warm and dry    LABS:                        13.6   9.25  )-----------( 257      ( 05 Jun 2018 07:24 )             40.9     06-05    140  |  98  |  x   ----------------------------<  x   3.9   |  x   |  x     Ca    8.0<L>      04 Jun 2018 06:34  Phos  2.9     06-04  Mg     2.1     06-04      PT/INR - ( 05 Jun 2018 07:24 )   PT: 11.3 sec;   INR: 1.04 ratio         PTT - ( 05 Jun 2018 07:24 )  PTT:31.0 sec      Assessment and Plan:  -Hypertensive emergency:  Continue hydralazine 75mg PO Q6h, Coreg 6.25mg PO Q12h, and Isordil 30mg PO Q8h.  Cardiology f/u  -Acute Systolic and diastolic CHF exacerbation:  continue Lasix 40mg PO Q12h, Coreg, hydralazine, and isordil.  No ACEI/ARB given acute kidney injury.  -LIT:  continue to monitor while on diuretics.  -DM:  continue humalog sliding scale  -Hypothyroidism:  continue Synthroid 50mcg PO daily  -BPH:  continue Proscar 5mg PO daily and Flomax 0.4mg PO QHS  -Dementia:  continue Aricept 10mg PO QHS  -VTE ppx:  heparin 5000 units SQ Q8h CHIEF COMPLAINT/INTERVAL HISTORY:  Pt. seen and evaluated for hypertensive emergency and acute systolic and diastolic CHF exacerbation.  Pt. is lying flat in bed in no distress.  Currently denies having any SOB.  Tolerating diuresis.      REVIEW OF SYSTEMS:  No fever, CP, SOB, or abdominal pain.      Vital Signs Last 24 Hrs  T(C): 36.9 (05 Jun 2018 07:00), Max: 37.1 (05 Jun 2018 05:27)  T(F): 98.4 (05 Jun 2018 07:00), Max: 98.8 (05 Jun 2018 05:27)  HR: 56 (05 Jun 2018 07:00) (55 - 81)  BP: 161/58 (05 Jun 2018 07:00) (97/51 - 190/80)  BP(mean): 89 (04 Jun 2018 23:00) (68 - 124)  RR: 16 (05 Jun 2018 07:00) (16 - 51)  SpO2: 94% (05 Jun 2018 07:00) (92% - 98%)    PHYSICAL EXAM:  GENERAL: NAD  HEENT: EOMI, hearing normal, conjunctiva and sclera clear  Chest: CTA bilaterally, no wheezing  CV: S1S2, RRR,   GI: soft, +BS, NT/ND  Musculoskeletal: no edema  Psychiatric: affect nL, mood nL  Skin: warm and dry    LABS:                        13.6   9.25  )-----------( 257      ( 05 Jun 2018 07:24 )             40.9     06-05    140  |  98  |  x   ----------------------------<  x   3.9   |  x   |  x     Ca    8.0<L>      04 Jun 2018 06:34  Phos  2.9     06-04  Mg     2.1     06-04      PT/INR - ( 05 Jun 2018 07:24 )   PT: 11.3 sec;   INR: 1.04 ratio         PTT - ( 05 Jun 2018 07:24 )  PTT:31.0 sec      Assessment and Plan:  -Hypertensive emergency:  Continue hydralazine 75mg PO Q6h, Coreg 6.25mg PO Q12h, and Isordil 30mg PO Q8h.  Cardiology f/u  -Acute Systolic and diastolic CHF exacerbation:  continue Lasix 40mg PO Q12h, Coreg, hydralazine, and isordil.  No ACEI/ARB given acute kidney injury.  -LIT:  Will hold diuretics for now.  Nephrology consult.   -DM:  continue humalog sliding scale  -Hypothyroidism:  continue Synthroid 50mcg PO daily  -BPH:  continue Proscar 5mg PO daily and Flomax 0.4mg PO QHS  -Dementia:  continue Aricept 10mg PO QHS  -VTE ppx:  heparin 5000 units SQ Q8h

## 2018-06-05 NOTE — PROGRESS NOTE ADULT - SUBJECTIVE AND OBJECTIVE BOX
[facility] - Michael Bernal, [place], [place], [place], Jazmyn Mccall  Office Number:  [phone]    Patient resting comfortably in bed in NAD.  Laying flat with no respiratory distress.  No complaints of chest pain, dyspnea, palpitations, PND, or orthopnea.    Telemetry:  Sinus rhythm in the 60s    MEDICATIONS  (STANDING):  aspirin  chewable 81 milliGRAM(s) Oral daily  atorvastatin 10 milliGRAM(s) Oral at bedtime  carvedilol 6.25 milliGRAM(s) Oral every 12 hours  cholecalciferol 1000 Unit(s) Oral daily  dextrose 5%. 1000 milliLiter(s) (50 mL/Hr) IV Continuous <Continuous>  dextrose 50% Injectable 12.5 Gram(s) IV Push once  dextrose 50% Injectable 25 Gram(s) IV Push once  dextrose 50% Injectable 25 Gram(s) IV Push once  docusate sodium 100 milliGRAM(s) Oral daily  donepezil 10 milliGRAM(s) Oral at bedtime  finasteride 5 milliGRAM(s) Oral daily  heparin  Injectable 5000 Unit(s) SubCutaneous every 8 hours  hydrALAZINE 75 milliGRAM(s) Oral every 6 hours  insulin lispro (HumaLOG) corrective regimen sliding scale   SubCutaneous Before meals and at bedtime  isosorbide   dinitrate Tablet (ISORDIL) 30 milliGRAM(s) Oral every 8 hours  levothyroxine 50 MICROGram(s) Oral daily  senna 2 Tablet(s) Oral at bedtime  tamsulosin 0.4 milliGRAM(s) Oral at bedtime    MEDICATIONS  (PRN):  dextrose 40% Gel 15 Gram(s) Oral once PRN Blood Glucose LESS THAN 70 milliGRAM(s)/deciliter  glucagon  Injectable 1 milliGRAM(s) IntraMuscular once PRN Glucose LESS THAN 70 milligrams/deciliter  polyethylene glycol 3350 17 Gram(s) Oral daily PRN Constipation      Allergies    No Known Allergies      Vital Signs Last 24 Hrs  T(C): 36.9 (05 Jun 2018 07:00), Max: 37.1 (05 Jun 2018 05:27)  T(F): 98.4 (05 Jun 2018 07:00), Max: 98.8 (05 Jun 2018 05:27)  HR: 56 (05 Jun 2018 07:00) (55 - 81)  BP: 161/58 (05 Jun 2018 07:00) (97/51 - 188/86)  BP(mean): 89 (04 Jun 2018 23:00) (68 - 124)  RR: 16 (05 Jun 2018 07:00) (16 - 51)  SpO2: 94% (05 Jun 2018 07:00) (92% - 98%)    I&O's Summary    04 Jun 2018 07:01  -  05 Jun 2018 07:00  --------------------------------------------------------  IN: 134 mL / OUT: 650 mL / NET: -516 mL        ON EXAM:    General: NAD, awake and alert, oriented x 3  HEENT: Mucous membranes are moist, anicteric  Lungs: Non-labored, breath sounds are clear bilaterally, No wheezing, rales or rhonchi  Cardiovascular: Regular, S1 and S2, no murmurs, rubs, or gallops  Gastrointestinal: Bowel Sounds present, soft, nontender.   Lymph: No peripheral edema. No lymphadenopathy.  Skin: No rashes or ulcers  Psych:  Mood & affect appropriate    LABS: All Labs Reviewed:                        13.6   9.25  )-----------( 257      ( 05 Jun 2018 07:24 )             40.9                         12.5   9.32  )-----------( 243      ( 04 Jun 2018 06:34 )             37.2                         13.0   10.98 )-----------( 246      ( 03 Jun 2018 05:36 )             38.6     05 Jun 2018 07:24    140    |  98     |  24     ----------------------------<  104    3.9     |  33     |  2.70   04 Jun 2018 06:34    139    |  98     |  20     ----------------------------<  115    3.5     |  32     |  2.20   03 Jun 2018 16:28    137    |  97     |  19     ----------------------------<  131    3.6     |  32     |  2.00     Ca    8.7        05 Jun 2018 07:24  Ca    8.0        04 Jun 2018 06:34  Ca    7.9        03 Jun 2018 16:28  Phos  3.1       05 Jun 2018 07:24  Phos  2.9       04 Jun 2018 06:34  Phos  2.5       03 Jun 2018 16:28  Mg     2.3       05 Jun 2018 07:24  Mg     2.1       04 Jun 2018 06:34  Mg     2.2       03 Jun 2018 16:28    TPro  5.9    /  Alb  2.3    /  TBili  0.6    /  DBili  x      /  AST  13     /  ALT  12     /  AlkPhos  61     05 Jun 2018 07:24  TPro  5.6    /  Alb  2.3    /  TBili  0.5    /  DBili  x      /  AST  13     /  ALT  9      /  AlkPhos  63     03 Jun 2018 05:36    PT/INR - ( 05 Jun 2018 07:24 )   PT: 11.3 sec;   INR: 1.04 ratio         PTT - ( 05 Jun 2018 07:24 )  PTT:31.0 sec        06-03 @ 05:36  TSH: 3.26

## 2018-06-05 NOTE — CONSULT NOTE ADULT - ASSESSMENT
Acute on CKD stage 3  Acute Decompensated Heart Failure  Contraction Metabolic Alkalosis  HTN  Kidney Lesion    - Initial presentation with LIT could have been due to poor cardiac output in the setting of heart failure, and uncontrolled HTN. Now with worsening renal indices that are likely due to IV diuresis.  - Hold Diuretics; patient's heart failure is well compensated, bordering dehydration as evident by contraction metabolic alkalosis and physical exam  - Check urine lytes  - Oral hydration recommended within reason (1-1.5L per day)  - Renal sonogram showing a suspicious kidney lesion of 3.1cm that appears to be chronic as it was seen a year previously. Consider Urology follow up.   - Avoid nephrotoxins; no ACEi/ARBS  - Daily chemistries    D/W Dr. Moulton, RN, and Daughter    Thank you

## 2018-06-05 NOTE — CONSULT NOTE ADULT - SUBJECTIVE AND OBJECTIVE BOX
Patient is a 89y old  Male who presents with a chief complaint of Hypertensive Emergency, CHF (2018 07:10)   Acute  renal failure.    HPI:  89 yo M with PMH of Dementia, HTN, BPH, Pancreatic Cancer, Bladder Cancer, JASMYN, Hypothyroidism, recently diagnosed DM, Renal Cyst presented to the ED with SOB while at rest. Hx obtained from patient and daughter at bedside. Symptoms started for the first time a few days ago and resolved on its own. At baseline, he lives alone and is able to complete all of his ADLs and does not use home O2. As per patient he is short of breath at rest- worst when he wakes up. Today patient was very short of breath upon waking up and called his daughter who brought him in. As per daughter patient has been following up with his PMD and Oncologist who have noted his blood pressure to be elevated (180-190 systolic) over the last few weeks. Noted to have LE edema, PMD thought it may be due to amlodipine which was d/cd and ACEi dose was doubled. However symptoms persisted despite medication change. Denies waking up at night due to SOB, or cough. Denies recent illness, sick contacts, fevers, chills.     Renal was consulted for LIT. The patient had a creatinine of 1.1 a year back and presented with a cr of 1.9. Now with worsening renal indices. The patient was on IV Lasix which helped improve his fluid overloaded state. No complaints at present.     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 1000 milliLiter(s) (50 mL/Hr) IV Continuous <Continuous>  dextrose 50% Injectable 12.5 Gram(s) IV Push once  dextrose 50% Injectable 25 Gram(s) IV Push once  dextrose 50% Injectable 25 Gram(s) IV Push once  docusate sodium 100 milliGRAM(s) Oral daily  donepezil 10 milliGRAM(s) Oral at bedtime  finasteride 5 milliGRAM(s) Oral daily  heparin  Injectable 5000 Unit(s) SubCutaneous every 8 hours  hydrALAZINE 100 milliGRAM(s) Oral every 8 hours  insulin lispro (HumaLOG) corrective regimen sliding scale   SubCutaneous Before meals and at bedtime  isosorbide   mononitrate ER Tablet (IMDUR) 120 milliGRAM(s) Oral once  levothyroxine 50 MICROGram(s) Oral daily  senna 2 Tablet(s) Oral at bedtime  tamsulosin 0.4 milliGRAM(s) Oral at bedtime    MEDICATIONS  (PRN):  dextrose 40% Gel 15 Gram(s) Oral once PRN Blood Glucose LESS THAN 70 milliGRAM(s)/deciliter  glucagon  Injectable 1 milliGRAM(s) IntraMuscular once PRN Glucose LESS THAN 70 milligrams/deciliter  polyethylene glycol 3350 17 Gram(s) Oral daily PRN Constipation   Meds reviewed    Allergies    No Known Allergies    Intolerances         REVIEW OF SYSTEMS:    CONSTITUTIONAL: negative  EYES: No eye pain, visual disturbances, or discharge  ENMT:  No difficulty hearing, tinnitus, vertigo; No sinus or throat pain  NECK: No pain or stiffness  BREASTS: No pain, masses, or nipple discharge  RESPIRATORY: neg  CARDIOVASCULAR: No chest pain, palpitations, dizziness,   GASTROINTESTINAL: No abdominal or epigastric pain. No nausea, vomiting, or hematemesis; No diarrhea or constipation. No melena or hematochezia  GENITOURINARY: No dysuria, frequency, hematuria, or incontinence  NEUROLOGICAL: No headaches, memory loss, loss of strength, numbness, or tremors  SKIN: no rashes  LYMPH NODES: No enlarged glands  ENDOCRINE: No heat or cold intolerance; No hair loss  MUSCULOSKELETAL: no edema  PSYCHIATRIC: No depression, anxiety, mood swings, or difficulty sleeping  HEME/LYMPH: No easy bruising, or bleeding gums  ALLERGY AND IMMUNOLOGIC: No hives or eczema    Vital Signs Last 24 Hrs  T(C): 36.9 (2018 07:00), Max: 37.1 (2018 05:27)  T(F): 98.4 (2018 07:00), Max: 98.8 (2018 05:27)  HR: 56 (2018 07:00) (55 - 81)  BP: 161/58 (2018 07:00) (97/51 - 188/86)  BP(mean): 89 (2018 23:00) (68 - 124)  RR: 16 (2018 07:00) (16 - 51)  SpO2: 94% (2018 07:00) (92% - 98%)  Daily     Daily Weight in k (2018 05:27)    PHYSICAL EXAM:    GENERAL: Elderly, sitting in chair, no distress, pleasant   HEAD:  Atraumatic, Normocephalic  EYES: EOMI, PERRLA, conjunctiva and sclera clear  ENMT: No tonsillar erythema, exudates, or enlargement; Moist mucous membranes, Good dentition, No lesions  NECK: Supple, no JVD  NERVOUS SYSTEM:  Alert & Awake, follows all commands well; Motor Strength wnl upper and lower extremities  CHEST/LUNG: Clear to percussion bilaterally; No rales, rhonchi, wheezing, or rubs  HEART: Regular rate and rhythm; No murmurs, rubs, or gallops  ABDOMEN: Soft, Nontender, Nondistended; Bowel sounds present, body wall and flank edema  EXTREMITIES:  no edema  LYMPH: No lymphadenopathy noted  SKIN: No rashes, +poor skin turgor    LABS:                        13.6   9.25  )-----------( 257      ( 2018 07:24 )             40.9     06-05    140  |  98  |  24<H>  ----------------------------<  104<H>  3.9   |  33<H>  |  2.70<H>    Ca    8.7      2018 07:24  Phos  3.1     06-05  Mg     2.3     06-05    TPro  5.9<L>  /  Alb  2.3<L>  /  TBili  0.6  /  DBili  x   /  AST  13<L>  /  ALT  12  /  AlkPhos  61  06-05    PT/INR - ( 2018 07:24 )   PT: 11.3 sec;   INR: 1.04 ratio         PTT - ( 2018 07:24 )  PTT:31.0 sec    Magnesium, Serum: 2.3 mg/dL (06-05 @ 07:24)  Phosphorus Level, Serum: 3.1 mg/dL (06-05 @ 07:24)        RADIOLOGY & ADDITIONAL TESTS:
[facility] - Michael Bernal, [place] Hugo Felipe Pannella, Patel, Savella  Office Number: [phone]    Initial Consult Note    CHIEF COMPLAINT: Patient is a 89y old  Male who presents with a chief complaint of dyspnea    HPI: This is a 89 year old man with a history of hypertension, newly diagnosed diabetes, dementia who presents to the ED with progressive dyspnea with exertion, now with dyspnea at rest.  HIs blood pressure has been uncontrolled at home  He was on amlodipine, but this was stopped secondary to increased LE edema.  He is on benazapril, and this was doubled. HE has a history of CKD, but it is unclear what his baseline creatinine is.  He does not report chest pain, PND, orthopnea, dizziness, or syncope.  He saw a cardiologist years ago for clearance, and was never told that something was wrong with his heart.  IN the ED he was found to be markedly hypertensive and in heart failure.  HE was given IV Lasix and IV hydralazine.  His blood pressure remains greater than 200 systolic.        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    FAMILY HISTORY:  Family history of breast cancer (Sibling)    No family history of acute MI or sudden cardiac death.    MEDICATIONS  (STANDING): Uloric, Levothyroxine, benazapril 40 QD, finasteride, carvedilol 6.25 bid, bethanechol, gabapentin tamsulosin, donepezil        Allergies    No Known Allergies          REVIEW OF SYSTEMS:    All other review of systems is negative unless indicated above    VITAL SIGNS:   Vital Signs Last 24 Hrs  T(C): 36.6 (02 Jun 2018 06:41), Max: 36.8 (02 Jun 2018 04:33)  T(F): 97.9 (02 Jun 2018 06:41), Max: 98.2 (02 Jun 2018 04:33)  HR: 64 (02 Jun 2018 06:41) (59 - 68)  BP: 222/82 (02 Jun 2018 06:41) (201/87 - 232/97)  BP(mean): --  RR: 16 (02 Jun 2018 06:41) (15 - 16)  SpO2: 97% (02 Jun 2018 06:41) (97% - 99%)    I&O's Summary      On Exam:    Constitutional: NAD, alert and oriented x 3  Lungs:  Non-labored, breath sounds are clear bilaterally.  Rales to the mid lung fields  Neck:  Positive JVD  Cardiovascular: RRR.  S1 and S2 positive.  No murmurs, rubs, gallops or clicks  Gastrointestinal: Bowel Sounds present, soft, nontender.   Lymph: 1-2+ peripheral edema. No cervical lymphadenopathy.  Neurological: Alert, no focal deficits  Skin: No rashes or ulcers   Psych:  Mood & affect appropriate.    LABS: All Labs Reviewed:                        14.0   10.36 )-----------( 241      ( 02 Jun 2018 05:02 )             42.7     02 Jun 2018 05:02    145    |  107    |  21     ----------------------------<  120    3.3     |  29     |  1.90     Ca    8.2        02 Jun 2018 05:02    TPro  6.2    /  Alb  2.5    /  TBili  0.4    /  DBili  x      /  AST  15     /  ALT  13     /  AlkPhos  68     02 Jun 2018 05:02    PT/INR - ( 02 Jun 2018 05:02 )   PT: 11.0 sec;   INR: 1.01 ratio         PTT - ( 02 Jun 2018 05:02 )  PTT:31.0 sec  CARDIAC MARKERS ( 02 Jun 2018 05:02 )  .100 ng/mL / x     / 41 U/L / x     / 1.4 ng/mL      Blood Culture:   06-02 @ 05:02  Pro Bnp [id number]        RADIOLOGY:  CXR with PVC    EKG:  NSR with old ASMI and nonspecific ST changes.  LAE
Patient is a 89y old male pmhx HTN, Pancreatic CA s/p 2/3 resection, splenectomy and chemotherapy, Bladder CA s/p immunotherapy (2013), BPH, ?dementia, hypothyroidism, CKD and new dx of DM who presented with a chief complaint of Dypspnea.  As per patient over the past week he has been experiencing intermittent episodes of dyspnea on exertion that has progressed significantly over the past day or so and increased bilateral LE edema that has progressed since onset of symptoms as well.  Patient states that over the past day the dyspnea progressed to dyspnea at rest that would dissipate over a few minutes.  Patient woke up this am around 0300 with complaints of dyspnea that wouldn't resolve prompting him to call his daughter to bring him to ED.  Upon arrival to ED patient found to be hypertensive 232/97mmHg and was given Lasix 40mg IV x1, Hydralazine 10mg IV x1, Labetalol 20mg IV x1 and ASA 325mg PO x1 without reduction in BP.  Called by Dr. Cornell for ICU consult and start of Nitroglycerine infusion.  At this time patient endorses dyspnea but appears comfortable when up and walking to/from bathroom.  Patient denies chest pain, palpitations, cough, hemoptysis, dizziness, headache, change in vision, nausea/voming, diarrhea, constipation, fever, chills or recent illness.  Patient lives at home by himself, can perform all ADL by himself.  Patient identifies daughter as health care surrogate.       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     FAMILY HISTORY:  Family history of breast cancer (Sibling)      Review of Systems:  See HPI      Medications:  nitroglycerin  Infusion 5 MICROgram(s)/Min IV Continuous <Continuous>      ICU Vital Signs Last 24 Hrs  T(C): 36.6 (02 Jun 2018 06:41), Max: 36.8 (02 Jun 2018 04:33)  T(F): 97.9 (02 Jun 2018 06:41), Max: 98.2 (02 Jun 2018 04:33)  HR: 64 (02 Jun 2018 06:41) (59 - 68)  BP: 222/82 (02 Jun 2018 06:41) (201/87 - 232/97)  BP(mean): --  ABP: --  ABP(mean): --  RR: 16 (02 Jun 2018 06:41) (15 - 16)  SpO2: 97% (02 Jun 2018 06:41) (97% - 99%)    Vital Signs Last 24 Hrs  T(C): 36.6 (02 Jun 2018 06:41), Max: 36.8 (02 Jun 2018 04:33)  T(F): 97.9 (02 Jun 2018 06:41), Max: 98.2 (02 Jun 2018 04:33)  HR: 64 (02 Jun 2018 06:41) (59 - 68)  BP: 222/82 (02 Jun 2018 06:41) (201/87 - 232/97)  BP(mean): --  RR: 16 (02 Jun 2018 06:41) (15 - 16)  SpO2: 97% (02 Jun 2018 06:41) (97% - 99%)      LABS:                        14.0   10.36 )-----------( 241      ( 02 Jun 2018 05:02 )             42.7     06-02    145  |  107  |  21  ----------------------------<  120<H>  3.3<L>   |  29  |  1.90<H>    Ca    8.2<L>      02 Jun 2018 05:02    TPro  6.2  /  Alb  2.5<L>  /  TBili  0.4  /  DBili  x   /  AST  15  /  ALT  13  /  AlkPhos  68  06-02      CARDIAC MARKERS ( 02 Jun 2018 05:02 )  .100 ng/mL / x     / 41 U/L / x     / 1.4 ng/mL      CAPILLARY BLOOD GLUCOSE        PT/INR - ( 02 Jun 2018 05:02 )   PT: 11.0 sec;   INR: 1.01 ratio         PTT - ( 02 Jun 2018 05:02 )  PTT:31.0 sec    CULTURES:      Physical Examination:    General: No acute distress.      HEENT: Pupils equal, reactive to light.  Symmetric.    PULM: Symmetrical thorax expansion upon respiration. Clear to auscultation bilaterally, no significant sputum production appreciated     CVS: Regular rate and rhythm, no murmurs, rubs, or gallops appreciated.     ABD: +normoactive bowel sounds x4, Soft, nondistended, nontender, normoactive bowel sounds, no masses appreciated    EXT: +1 pitting edema, nontender, DP pulses symmetrical.     SKIN: Warm and well perfused, no rashes noted.    NEURO: Alert, oriented, interactive, nonfocal, moving all 4 extremities.     RADIOLOGY:   < from: CT Head No Cont (06.02.18 @ 05:46) >  IMPRESSION:  No acute intracranial hemorrhage, mass effect or evidence of acute   territorial infarct. Chronic findings as above.    < end of copied text >    < from: CT Chest No Cont (07.09.17 @ 14:12) >  INTERPRETATION:  Clinical information: Shortness of breath, fever and   cough    Comparison study dated 11/30/2013.    Axial images obtained, coronal and sagittal images computer reformatted.     4 mm nodule, subpleural, right upper lobe. Ill-defined groundglass nodule   measuring 6 mm right lower lobe best appreciated on axial imaging, series   2 image 99.  Linear scarring, stranding, left lower lobe. No vascular   congestion. No effusion. No lobar consolidation. No pneumothorax.    No pericardial effusion. No thoracic aortic aneurysm. Calcified coronary   arteries evident. Hiatus hernia present.    Central airway intact. Thyroid gland not enlarged. No mediastinal or   hilar lesions evident, evaluation limited due to lack of IV contrast.   Bilateral renal cysts present. The spleen is not identified, correlate   clinically. Hypodensity right hepatic lobe likely a cyst too small to   characterize. No acute osseous abnormalities.    IMPRESSION:    See above report.    < end of copied text >    < from: Xray Chest 1 View AP/PA (07.09.17 @ 10:46) >  FINDINGS:  Prior study dated 4/25/2017 was available for review.    The lungs are clear of infiltrate or effusion. Calcified pleural plaque   is seen at the right base. Minimal pleural thickening is seen at the left   base. The heart and mediastinum appear intact.           IMPRESSION: No gross consolidation is seen.           < end of copied text >      CRITICAL CARE TIME SPENT: ***

## 2018-06-05 NOTE — PHYSICAL THERAPY INITIAL EVALUATION ADULT - ADDITIONAL COMMENTS
Pt lives alone in a house, + steps. Pt ambulated independently without device - used SC as needed and was independent with ADLs. + driving.

## 2018-06-05 NOTE — PROGRESS NOTE ADULT - ASSESSMENT
89 year old man with a history of hypertension, newly diagnosed diabetes, dementia who presents to the ED with progressive dyspnea with exertion, now with dyspnea at rest, hypertensive emergency, acute decompensated heart failure, pulmonary edema, much improved, echo showing moderate global LV systolic dysfunction:    - Patient improving from a volume standpoint  - Change hydralazine to 100 TID  - Start Imdur 120 QD instead of isordil  - Continue off of ACEi for now given renal function  - his hr is slow and his renal function is abnormal, which are both limiting the choice of bp meds  - Off of Lasix for now given LIT on CKD  - Monitor I, O, K, creatinine closely  - Supplemental oxygen as needed  - Continue carvedilol 6.25 bid.     - Aspirin 81 qd  - Monitor and replete lytes  - Hopefully his LV dysfunction related to hypertension.  I would prefer to repeat the echocardiogram in the future to reassess his LV after medical therapy rather than rushing him to invasive angiography, especially noting his age and renal function

## 2018-06-06 ENCOUNTER — TRANSCRIPTION ENCOUNTER (OUTPATIENT)
Age: 83
End: 2018-06-06

## 2018-06-06 VITALS — HEART RATE: 64 BPM | DIASTOLIC BLOOD PRESSURE: 59 MMHG | SYSTOLIC BLOOD PRESSURE: 144 MMHG

## 2018-06-06 LAB
ANION GAP SERPL CALC-SCNC: 11 MMOL/L — SIGNIFICANT CHANGE UP (ref 5–17)
BUN SERPL-MCNC: 29 MG/DL — HIGH (ref 7–23)
CALCIUM SERPL-MCNC: 8.1 MG/DL — LOW (ref 8.5–10.1)
CHLORIDE SERPL-SCNC: 98 MMOL/L — SIGNIFICANT CHANGE UP (ref 96–108)
CO2 SERPL-SCNC: 31 MMOL/L — SIGNIFICANT CHANGE UP (ref 22–31)
CREAT SERPL-MCNC: 2.8 MG/DL — HIGH (ref 0.5–1.3)
GLUCOSE SERPL-MCNC: 120 MG/DL — HIGH (ref 70–99)
HCT VFR BLD CALC: 36.7 % — LOW (ref 39–50)
HGB BLD-MCNC: 12.5 G/DL — LOW (ref 13–17)
MAGNESIUM SERPL-MCNC: 2.1 MG/DL — SIGNIFICANT CHANGE UP (ref 1.6–2.6)
MCHC RBC-ENTMCNC: 27.5 PG — SIGNIFICANT CHANGE UP (ref 27–34)
MCHC RBC-ENTMCNC: 34.1 GM/DL — SIGNIFICANT CHANGE UP (ref 32–36)
MCV RBC AUTO: 80.7 FL — SIGNIFICANT CHANGE UP (ref 80–100)
NRBC # BLD: 0 /100 WBCS — SIGNIFICANT CHANGE UP (ref 0–0)
PLATELET # BLD AUTO: 238 K/UL — SIGNIFICANT CHANGE UP (ref 150–400)
POTASSIUM SERPL-MCNC: 3.3 MMOL/L — LOW (ref 3.5–5.3)
POTASSIUM SERPL-SCNC: 3.3 MMOL/L — LOW (ref 3.5–5.3)
RBC # BLD: 4.55 M/UL — SIGNIFICANT CHANGE UP (ref 4.2–5.8)
RBC # FLD: 15.5 % — HIGH (ref 10.3–14.5)
SODIUM SERPL-SCNC: 140 MMOL/L — SIGNIFICANT CHANGE UP (ref 135–145)
WBC # BLD: 8.94 K/UL — SIGNIFICANT CHANGE UP (ref 3.8–10.5)
WBC # FLD AUTO: 8.94 K/UL — SIGNIFICANT CHANGE UP (ref 3.8–10.5)

## 2018-06-06 PROCEDURE — 76775 US EXAM ABDO BACK WALL LIM: CPT

## 2018-06-06 PROCEDURE — 85379 FIBRIN DEGRADATION QUANT: CPT

## 2018-06-06 PROCEDURE — 85730 THROMBOPLASTIN TIME PARTIAL: CPT

## 2018-06-06 PROCEDURE — 82550 ASSAY OF CK (CPK): CPT

## 2018-06-06 PROCEDURE — 85610 PROTHROMBIN TIME: CPT

## 2018-06-06 PROCEDURE — 84484 ASSAY OF TROPONIN QUANT: CPT

## 2018-06-06 PROCEDURE — 83880 ASSAY OF NATRIURETIC PEPTIDE: CPT

## 2018-06-06 PROCEDURE — 99239 HOSP IP/OBS DSCHRG MGMT >30: CPT

## 2018-06-06 PROCEDURE — 80048 BASIC METABOLIC PNL TOTAL CA: CPT

## 2018-06-06 PROCEDURE — 84100 ASSAY OF PHOSPHORUS: CPT

## 2018-06-06 PROCEDURE — 99232 SBSQ HOSP IP/OBS MODERATE 35: CPT

## 2018-06-06 PROCEDURE — 93005 ELECTROCARDIOGRAM TRACING: CPT

## 2018-06-06 PROCEDURE — 85027 COMPLETE CBC AUTOMATED: CPT

## 2018-06-06 PROCEDURE — 80061 LIPID PANEL: CPT

## 2018-06-06 PROCEDURE — 82962 GLUCOSE BLOOD TEST: CPT

## 2018-06-06 PROCEDURE — C8929: CPT

## 2018-06-06 PROCEDURE — 99285 EMERGENCY DEPT VISIT HI MDM: CPT | Mod: 25

## 2018-06-06 PROCEDURE — 97116 GAIT TRAINING THERAPY: CPT

## 2018-06-06 PROCEDURE — 82553 CREATINE MB FRACTION: CPT

## 2018-06-06 PROCEDURE — 84443 ASSAY THYROID STIM HORMONE: CPT

## 2018-06-06 PROCEDURE — 71045 X-RAY EXAM CHEST 1 VIEW: CPT

## 2018-06-06 PROCEDURE — 96374 THER/PROPH/DIAG INJ IV PUSH: CPT

## 2018-06-06 PROCEDURE — 97161 PT EVAL LOW COMPLEX 20 MIN: CPT

## 2018-06-06 PROCEDURE — 97530 THERAPEUTIC ACTIVITIES: CPT

## 2018-06-06 PROCEDURE — 83735 ASSAY OF MAGNESIUM: CPT

## 2018-06-06 PROCEDURE — 93306 TTE W/DOPPLER COMPLETE: CPT

## 2018-06-06 PROCEDURE — 80053 COMPREHEN METABOLIC PANEL: CPT

## 2018-06-06 PROCEDURE — 83036 HEMOGLOBIN GLYCOSYLATED A1C: CPT

## 2018-06-06 PROCEDURE — 70450 CT HEAD/BRAIN W/O DYE: CPT

## 2018-06-06 RX ORDER — CARVEDILOL PHOSPHATE 80 MG/1
1 CAPSULE, EXTENDED RELEASE ORAL
Qty: 0 | Refills: 0 | COMMUNITY
Start: 2018-06-06

## 2018-06-06 RX ORDER — HYDRALAZINE HCL 50 MG
1 TABLET ORAL
Qty: 90 | Refills: 0 | OUTPATIENT
Start: 2018-06-06 | End: 2018-07-05

## 2018-06-06 RX ORDER — ISOSORBIDE MONONITRATE 60 MG/1
1 TABLET, EXTENDED RELEASE ORAL
Qty: 30 | Refills: 0 | OUTPATIENT
Start: 2018-06-06 | End: 2018-07-05

## 2018-06-06 RX ORDER — POTASSIUM CHLORIDE 20 MEQ
40 PACKET (EA) ORAL ONCE
Qty: 0 | Refills: 0 | Status: COMPLETED | OUTPATIENT
Start: 2018-06-06 | End: 2018-06-06

## 2018-06-06 RX ORDER — DONEPEZIL HYDROCHLORIDE 10 MG/1
1 TABLET, FILM COATED ORAL
Qty: 0 | Refills: 0 | COMMUNITY
Start: 2018-06-06

## 2018-06-06 RX ORDER — CARVEDILOL PHOSPHATE 80 MG/1
1 CAPSULE, EXTENDED RELEASE ORAL
Qty: 0 | Refills: 0 | COMMUNITY

## 2018-06-06 RX ORDER — CHOLECALCIFEROL (VITAMIN D3) 125 MCG
1000 CAPSULE ORAL
Qty: 0 | Refills: 0 | COMMUNITY
Start: 2018-06-06

## 2018-06-06 RX ORDER — DONEPEZIL HYDROCHLORIDE 10 MG/1
1 TABLET, FILM COATED ORAL
Qty: 0 | Refills: 0 | COMMUNITY

## 2018-06-06 RX ORDER — ASPIRIN/CALCIUM CARB/MAGNESIUM 324 MG
1 TABLET ORAL
Qty: 0 | Refills: 0 | COMMUNITY
Start: 2018-06-06

## 2018-06-06 RX ORDER — BENAZEPRIL HYDROCHLORIDE 40 MG/1
1 TABLET ORAL
Qty: 0 | Refills: 0 | COMMUNITY

## 2018-06-06 RX ORDER — BETHANECHOL CHLORIDE 25 MG
1 TABLET ORAL
Qty: 0 | Refills: 0 | COMMUNITY

## 2018-06-06 RX ORDER — ATORVASTATIN CALCIUM 80 MG/1
1 TABLET, FILM COATED ORAL
Qty: 0 | Refills: 0 | COMMUNITY
Start: 2018-06-06

## 2018-06-06 RX ORDER — ISOSORBIDE MONONITRATE 60 MG/1
1 TABLET, EXTENDED RELEASE ORAL
Qty: 0 | Refills: 0 | COMMUNITY
Start: 2018-06-06

## 2018-06-06 RX ORDER — HYDRALAZINE HCL 50 MG
1 TABLET ORAL
Qty: 0 | Refills: 0 | COMMUNITY
Start: 2018-06-06

## 2018-06-06 RX ADMIN — Medication 40 MILLIEQUIVALENT(S): at 12:50

## 2018-06-06 RX ADMIN — ISOSORBIDE MONONITRATE 120 MILLIGRAM(S): 60 TABLET, EXTENDED RELEASE ORAL at 12:50

## 2018-06-06 RX ADMIN — FINASTERIDE 5 MILLIGRAM(S): 5 TABLET, FILM COATED ORAL at 12:51

## 2018-06-06 RX ADMIN — Medication 100 MILLIGRAM(S): at 05:56

## 2018-06-06 RX ADMIN — Medication 1000 UNIT(S): at 12:51

## 2018-06-06 RX ADMIN — Medication 100 MILLIGRAM(S): at 12:51

## 2018-06-06 RX ADMIN — HEPARIN SODIUM 5000 UNIT(S): 5000 INJECTION INTRAVENOUS; SUBCUTANEOUS at 05:55

## 2018-06-06 RX ADMIN — CARVEDILOL PHOSPHATE 6.25 MILLIGRAM(S): 80 CAPSULE, EXTENDED RELEASE ORAL at 05:56

## 2018-06-06 RX ADMIN — Medication 50 MICROGRAM(S): at 05:56

## 2018-06-06 RX ADMIN — Medication 81 MILLIGRAM(S): at 12:51

## 2018-06-06 NOTE — DISCHARGE NOTE ADULT - ADDITIONAL INSTRUCTIONS
fu pmd dr abel harrington office with in 1wk , fu cardio dr ivy in 1wk , off diuretics  sec high cr / fu closely  repeat electrolyte with in 1wk .

## 2018-06-06 NOTE — PROGRESS NOTE ADULT - SUBJECTIVE AND OBJECTIVE BOX
NEPHROLOGY INTERVAL HPI/OVERNIGHT EVENTS:  HPI:  89 yo M with PMH of Dementia, HTN, BPH, Pancreatic Cancer, Bladder Cancer, JASMYN, Hypothyroidism, recently diagnosed DM, Renal Cyst presented to the ED with SOB while at rest. Hx obtained from patient and daughter at bedside. Symptoms started for the first time a few days ago and resolved on its own. At baseline, he lives alone and is able to complete all of his ADLs and does not use home O2. As per patient he is short of breath at rest- worst when he wakes up. Today patient was very short of breath upon waking up and called his daughter who brought him in. As per daughter patient has been following up with his PMD and Oncologist who have noted his blood pressure to be elevated (180-190 systolic) over the last few weeks. Noted to have LE edema, PMD thought it may be due to amlodipine which was d/cd and ACEi dose was doubled. However symptoms persisted despite medication change. Denies waking up at night due to SOB, or cough. Denies recent illness, sick contacts, fevers, chills.     In the ED, patient was hypertensive (-232/), otherwise hemodynamically stable. In the ED, labs significant for elevated D-Dimer of 442, hypokalemia (K 3.3), elevated Cr (1.9, baseline Cr of 1.1-1.2 per chart review), decreased eGFR (30, baseline of 53-59), elevated trop (.100), and elevated pro-BNP (83247). CT Head negative for acute intracranial hemorrhage, mass effect or evidence of acute territorial infarct. CXr showed a new focal airspace   infiltrate at the right base presumptive pneumonia, a small right parapneumonic effusion noted, a small left pleural effusion. EKG showed NSR @ 64 BPM w/ signs of LVH. Received ASA 325mg PO x1, Lasix 40mg IV x1, Hydralazine 10mg IV x1, Labetalol 20mg IV x1, KCl 40 mEq PO x1. Started on Nitroglycerin infusion. 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 1000 milliLiter(s) (50 mL/Hr) IV Continuous <Continuous>  dextrose 50% Injectable 12.5 Gram(s) IV Push once  dextrose 50% Injectable 25 Gram(s) IV Push once  dextrose 50% Injectable 25 Gram(s) IV Push once  docusate sodium 100 milliGRAM(s) Oral daily  donepezil 10 milliGRAM(s) Oral at bedtime  finasteride 5 milliGRAM(s) Oral daily  heparin  Injectable 5000 Unit(s) SubCutaneous every 8 hours  hydrALAZINE 100 milliGRAM(s) Oral every 8 hours  insulin lispro (HumaLOG) corrective regimen sliding scale   SubCutaneous Before meals and at bedtime  isosorbide   mononitrate ER Tablet (IMDUR) 120 milliGRAM(s) Oral daily  levothyroxine 50 MICROGram(s) Oral daily  potassium chloride    Tablet ER 40 milliEquivalent(s) Oral once  senna 2 Tablet(s) Oral at bedtime  tamsulosin 0.4 milliGRAM(s) Oral at bedtime    MEDICATIONS  (PRN):  dextrose 40% Gel 15 Gram(s) Oral once PRN Blood Glucose LESS THAN 70 milliGRAM(s)/deciliter  glucagon  Injectable 1 milliGRAM(s) IntraMuscular once PRN Glucose LESS THAN 70 milligrams/deciliter  polyethylene glycol 3350 17 Gram(s) Oral daily PRN Constipation      Allergies    No Known Allergies    Intolerances        Vital Signs Last 24 Hrs  T(C): 36.3 (2018 11:55), Max: 36.9 (2018 07:35)  T(F): 97.3 (2018 11:55), Max: 98.5 (2018 07:35)  HR: 62 (2018 11:55) (53 - 83)  BP: 172/67 (2018 11:55) (120/57 - 172/67)  BP(mean): --  RR: 19 (2018 11:55) (16 - 19)  SpO2: 97% (2018 11:55) (93% - 97%)  Daily     Daily Weight in k.4 (2018 10:33)    PHYSICAL EXAM:    GENERAL: NAD, well-groomed, well-developed  HEAD:  Atraumatic, Normocephalic  EYES: EOMI, PERRLA, conjunctiva and sclera clear  ENMT: No tonsillar erythema, exudates, or enlargement; Moist mucous membranes, Good dentition, No lesions  NECK: Supple, No JVD, Normal thyroid  NERVOUS SYSTEM:  Alert & Oriented X3, Good concentration; Motor Strength 5/5 B/L upper and lower extremities; DTRs 2+ intact and symmetric  CHEST/LUNG: Clear to percussion bilaterally; No rales, rhonchi, wheezing, or rubs  HEART: Regular rate and rhythm; No murmurs, rubs, or gallops  ABDOMEN: Soft, Nontender, Nondistended; Bowel sounds present  EXTREMITIES:  2+ Peripheral Pulses, No clubbing, cyanosis, or edema  SKIN: No rashes or lesions    LABS:                        12.5   8.94  )-----------( 238      ( 2018 07:19 )             36.7     06-06    140  |  98  |  29<H>  ----------------------------<  120<H>  3.3<L>   |  31  |  2.80<H>    Ca    8.1<L>      2018 07:19  Phos  3.1     06-05  Mg     2.1     06-06    TPro  5.9<L>  /  Alb  2.3<L>  /  TBili  0.6  /  DBili  x   /  AST  13<L>  /  ALT  12  /  AlkPhos  61  06-05    PT/INR - ( 2018 07:24 )   PT: 11.3 sec;   INR: 1.04 ratio         PTT - ( 2018 07:24 )  PTT:31.0 sec    Magnesium, Serum: 2.1 mg/dL ( @ 07:19)        RADIOLOGY & ADDITIONAL TESTS:

## 2018-06-06 NOTE — PROGRESS NOTE ADULT - ASSESSMENT
Acute on CKD stage 3  Acute Decompensated Heart Failure  Contraction Metabolic Alkalosis  HTN  Kidney Lesion    - Initial presentation with LIT could have been due to poor cardiac output in the setting of heart failure, and uncontrolled HTN. Now with worsening renal indices that are likely due to IV diuresis.  - Holding  Diuretics; patient's heart failure is well compensated, bordering dehydration as evident by contraction metabolic alkalosis and physical exam  - Check urine lytes  - Oral hydration recommended within reason (1-1.5L per day)  - Renal sonogram showing a suspicious kidney lesion of 3.1cm that appears to be chronic as it was seen a year previously. Consider Urology follow up.   - Avoid nephrotoxins; no ACEi/ARBS  - Daily chemistries    D/W  RN, and Daughter, EMANUEL in am    Thank you Acute on CKD stage 3  Acute Decompensated Heart Failure  Contraction Metabolic Alkalosis  HTN  Kidney Lesion  Hypokalemia    - Initial presentation with LIT could have been due to poor cardiac output in the setting of heart failure, and uncontrolled HTN. Now with worsening renal indices that are likely due to IV diuresis.  - Holding  Diuretics; patient's heart failure is well compensated, bordering dehydration as evident by contraction metabolic alkalosis and physical exam  - Check urine lytes  - Oral hydration recommended within reason (1-1.5L per day)  - Renal sonogram showing a suspicious kidney lesion of 3.1cm that appears to be chronic as it was seen a year previously. Consider Urology follow up.   - Avoid nephrotoxins; no ACEi/ARBS  - Daily chemistries  -Kcl 40 x 1 today    D/W  RN, and Daughter, EMANUEL in am    Thank you

## 2018-06-06 NOTE — SWALLOW BEDSIDE ASSESSMENT ADULT - COMMENTS
Pt alert, not cooperative, daughter at bedside. Pt admitted with CHF, HTN. Pt only accepted trials of thin liquids, he became combative when presented with trials of nectar thickened liquids and solids. Pt with adequate oral prep, rapid AP transport, swallow delay, reduced laryngeal elevation. Overt s/s of aspiration for thin liquids. Pt and daughter educated on risks of aspiration, dysphagia diet. Pt refused further trials and MBS.  Discussed with Dr. Falk who reported she is discharging him today. Pt and family provided with information for outpatient MBS and how to thicken liquids at home. Recommend outpatient MBS, soft solids with nectar thickened liquids.

## 2018-06-06 NOTE — DISCHARGE NOTE ADULT - PATIENT PORTAL LINK FT
You can access the [facility] Patient Portal, offered by [facility], by registering with the following website: [email or web address]

## 2018-06-06 NOTE — DISCHARGE NOTE ADULT - HOSPITAL COURSE
89 yo M with PMH of Dementia, HTN, BPH, Pancreatic Cancer, Bladder Cancer, JASMYN, Hypothyroidism, recently diagnosed DM, Renal Cyst presented to the ED with SOB while at rest. Hx obtained from patient and daughter at bedside. Symptoms started for the first time a few days ago and resolved on its own. At baseline, he lives alone and is able to complete all of his ADLs and does not use home O2. As per patient he is short of breath at rest- worst when he wakes up. Today patient was very short of breath upon waking up and called his daughter who brought him in. As per daughter patient has been following up with his PMD and Oncologist who have noted his blood pressure to be elevated (180-190 systolic) over the last few weeks. Noted to have LE edema, PMD thought it may be due to amlodipine which was d/cd and ACEi dose was doubled. However symptoms persisted despite medication change. Denies Hypertensive emergency:  Continue hydralazine 75mg PO Q6h, Coreg 6.25mg PO Q12h, and Isordil 30mg PO Q8h.  Cardiology f/uw HF exacerbation, and LIT possibly 2/2 HTN emergency    hosp course    c HF exacerbation possibly 2/2 to HTN emergency  In ED given Lasix, hydralazine, started on nitro drip  Home BP med- Acei held 2/2 LIT on ckd4   Continue with Coreg with hold parameters  ICU consulted  Cardio- Dr. Alejandra following.    CHF (congestive heart failure).   w/ SOB, KENNEDY, and elv proBNP  given lasix in ED, c/w IV lasix as per ICU and cardio recs  strict i/os , hosp course pt have as per echo mod lv dysfunction with diastolic dysfunction  so this time     -Acute Systolic and diastolic CHF exacerbation:  continue Lasix 40mg PO Q12h later stopped lasix as sec to lit on ckd4  , Coreg,  add on hydralazine, and isordil.  No ACEI/ARB given acute kidney injury.  -LIT:  Will hold diuretics for now  with worsening renal indices that are likely due to IV diuresis.  - Hold Diuretics; patient's heart failure is well compensated, bordering dehydration as evident by contraction metabolic alkalosis Nephrology consult dr escudero  agree with plan .  hypokalemia replaced.   -DM: type2  continue humalog sliding scale/ pt diet control in home with hb a1c 6.3.   -Hypothyroidism:  continue Synthroid 50mcg PO daily  -BPH:  continue Proscar 5mg PO daily and Flomax 0.4mg PO QHS  -Dementia:  continue Aricept 10mg PO QHS , pt seen by pt evalution no need rehab . pt daughter aware all plan .   medically stable pt clear by cardio dr ivy / hold all diuretics for now . physical exam 6-6-18 day of dc   vVital Signs Last 24 Hrs  T(C): 36.9 (06 Jun 2018 07:35), Max: 36.9 (06 Jun 2018 07:35)  T(F): 98.5 (06 Jun 2018 07:35), Max: 98.5 (06 Jun 2018 07:35)  HR: 53 (06 Jun 2018 07:35) (53 - 83)  BP: 120/57 (06 Jun 2018 07:35) (120/57 - 157/69)  BP(mean): --  RR: 19 (06 Jun 2018 07:35) (16 - 19)  SpO2: 94% (06 Jun 2018 07:35) (93% - 97%) GEN no distress , HEENT nt/nc , perrla , CVS s1s2 no tachy , CHEST bl air entery  present  , no wheezing , no rale  , CNS aao/3  , no focal deficit , GI soft , bs present , EXT no edema, pedal pulse present , SKIN no rash.  pmd dr abel miner and notified / fu with in 1wk . fu cardio dr ivy with in 1wk .

## 2018-06-06 NOTE — DISCHARGE NOTE ADULT - CARE PROVIDER_API CALL
Ramiro Eldridge), Internal Medicine  [address], [place] [id number]  Phone: [phone]  Fax: [phone]

## 2018-06-06 NOTE — PROGRESS NOTE ADULT - SUBJECTIVE AND OBJECTIVE BOX
[facility] - Michael Bernal, [place], [place], [place], Jazmyn Mccall  Office Number:  [phone]    Patient resting comfortably in bed in NAD.  Laying flat with no respiratory distress.  No complaints of chest pain, dyspnea, palpitations, PND, or orthopnea.  Feeling fine this morning and wants to go home    ROS: negative unless otherwise mentioned.    Telemetry:  SR/SB    MEDICATIONS  (STANDING):  aspirin  chewable 81 milliGRAM(s) Oral daily  atorvastatin 10 milliGRAM(s) Oral at bedtime  carvedilol 6.25 milliGRAM(s) Oral every 12 hours  cholecalciferol 1000 Unit(s) Oral daily  dextrose 5%. 1000 milliLiter(s) (50 mL/Hr) IV Continuous <Continuous>  dextrose 50% Injectable 12.5 Gram(s) IV Push once  dextrose 50% Injectable 25 Gram(s) IV Push once  dextrose 50% Injectable 25 Gram(s) IV Push once  docusate sodium 100 milliGRAM(s) Oral daily  donepezil 10 milliGRAM(s) Oral at bedtime  finasteride 5 milliGRAM(s) Oral daily  heparin  Injectable 5000 Unit(s) SubCutaneous every 8 hours  hydrALAZINE 100 milliGRAM(s) Oral every 8 hours  insulin lispro (HumaLOG) corrective regimen sliding scale   SubCutaneous Before meals and at bedtime  isosorbide   mononitrate ER Tablet (IMDUR) 120 milliGRAM(s) Oral daily  levothyroxine 50 MICROGram(s) Oral daily  senna 2 Tablet(s) Oral at bedtime  tamsulosin 0.4 milliGRAM(s) Oral at bedtime    MEDICATIONS  (PRN):  dextrose 40% Gel 15 Gram(s) Oral once PRN Blood Glucose LESS THAN 70 milliGRAM(s)/deciliter  glucagon  Injectable 1 milliGRAM(s) IntraMuscular once PRN Glucose LESS THAN 70 milligrams/deciliter  polyethylene glycol 3350 17 Gram(s) Oral daily PRN Constipation      Allergies    No Known Allergies    Intolerances        Vital Signs Last 24 Hrs  T(C): 36.9 (06 Jun 2018 07:35), Max: 36.9 (06 Jun 2018 07:35)  T(F): 98.5 (06 Jun 2018 07:35), Max: 98.5 (06 Jun 2018 07:35)  HR: 53 (06 Jun 2018 07:35) (53 - 83)  BP: 120/57 (06 Jun 2018 07:35) (120/57 - 157/69)  BP(mean): --  RR: 19 (06 Jun 2018 07:35) (16 - 19)  SpO2: 94% (06 Jun 2018 07:35) (93% - 97%)    I&O's Summary      ON EXAM:    General: NAD, awake and alert, oriented x 3  HEENT: Mucous membranes are moist, anicteric  Lungs: Non-labored, breath sounds are clear bilaterally, No wheezing, rales or rhonchi  Cardiovascular: Regular, S1 and S2, no murmurs, rubs, or gallops  Gastrointestinal: Bowel Sounds present, soft, nontender.   Lymph: No peripheral edema. No lymphadenopathy.  Skin: No rashes or ulcers  Psych:  Mood & affect appropriate    LABS: All Labs Reviewed:                        12.5   8.94  )-----------( 238      ( 06 Jun 2018 07:19 )             36.7                         13.6   9.25  )-----------( 257      ( 05 Jun 2018 07:24 )             40.9                         12.5   9.32  )-----------( 243      ( 04 Jun 2018 06:34 )             37.2     06 Jun 2018 07:19    140    |  98     |  29     ----------------------------<  120    3.3     |  31     |  2.80   05 Jun 2018 07:24    140    |  98     |  24     ----------------------------<  104    3.9     |  33     |  2.70   04 Jun 2018 06:34    139    |  98     |  20     ----------------------------<  115    3.5     |  32     |  2.20     Ca    8.1        06 Jun 2018 07:19  Ca    8.7        05 Jun 2018 07:24  Ca    8.0        04 Jun 2018 06:34  Phos  3.1       05 Jun 2018 07:24  Phos  2.9       04 Jun 2018 06:34  Phos  2.5       03 Jun 2018 16:28  Mg     2.1       06 Jun 2018 07:19  Mg     2.3       05 Jun 2018 07:24  Mg     2.1       04 Jun 2018 06:34    TPro  5.9    /  Alb  2.3    /  TBili  0.6    /  DBili  x      /  AST  13     /  ALT  12     /  AlkPhos  61     05 Jun 2018 07:24    PT/INR - ( 05 Jun 2018 07:24 )   PT: 11.3 sec;   INR: 1.04 ratio         PTT - ( 05 Jun 2018 07:24 )  PTT:31.0 sec      Blood Culture:

## 2018-06-06 NOTE — DISCHARGE NOTE ADULT - CARE PLAN
Principal Discharge DX:	CHF (congestive heart failure)  Goal:	acute on chr diastolic systolic  Assessment and plan of treatment:	barbara solved / now hold all diuretics as cr is high , fu electrolyte , low salt diet , daily wt  Secondary Diagnosis:	Hypertension  Assessment and plan of treatment:	on meds / add new Imdur and hydralazine , stopped benazepril sec to high cr .  Secondary Diagnosis:	Renal cyst  Goal:	chr fu out pt  Assessment and plan of treatment:	3.1 cm heterogeneous lesion in the interpolar region of the right kidney   concerning for malignancy, better seen on abdominal MRI performed on   2/11/2017. fu out pt urologist  for further work up .  Secondary Diagnosis:	LIT (acute kidney injury)  Assessment and plan of treatment:	on ckd 4 ,   hold ace inhibitor  Secondary Diagnosis:	Diabetes  Goal:	type2 hb a1c 6.3  Assessment and plan of treatment:	continue dm  diet control / fu repeat hb a1c 3 month

## 2018-06-06 NOTE — SWALLOW BEDSIDE ASSESSMENT ADULT - SWALLOW EVAL: RECOMMENDED FEEDING/EATING TECHNIQUES
allow for swallow between intakes/check mouth frequently for oral residue/pocketing/position upright (90 degrees)

## 2018-06-06 NOTE — DISCHARGE NOTE ADULT - MEDICATION SUMMARY - MEDICATIONS TO STOP TAKING
I will STOP taking the medications listed below when I get home from the hospital:    benazepril 40 mg oral tablet  -- 1 tab(s) by mouth once a day  -- Takes in AM

## 2018-06-06 NOTE — PROGRESS NOTE ADULT - ASSESSMENT
89 year old man with a history of hypertension, newly diagnosed diabetes, dementia who presents to the ED with progressive dyspnea with exertion, now with dyspnea at rest, hypertensive emergency, acute decompensated heart failure, pulmonary edema, much improved, echo showing moderate global LV systolic dysfunction:    - Patient improving from a volume standpoint  - BP much better controlled  - Continue hydralazine to 100 TID  - Imdur as ordered  - Continue off of ACEi for now given renal function  - his hr is slow and his renal function is abnormal, which are both limiting the choice of bp meds  - Off of Lasix for now given LIT on CKD  - Monitor I, O, K, creatinine closely  - Supplemental oxygen as needed  - Continue carvedilol 6.25 bid.     - Aspirin 81 qd  - Monitor and replete lytes  - Hopefully his LV dysfunction related to hypertension.  I would prefer to repeat the echocardiogram in the future to reassess his LV after medical therapy rather than rushing him to invasive angiography, especially noting his age and renal function  - No cardiac contraindication to d/c home. He can follow up with us in the office

## 2018-06-06 NOTE — SWALLOW BEDSIDE ASSESSMENT ADULT - ASR SWALLOW ASPIRATION MONITOR
fever/throat clearing/upper respiratory infection/pneumonia/change of breathing pattern/cough/gurgly voice

## 2018-06-06 NOTE — DISCHARGE NOTE ADULT - PLAN OF CARE
acute on chr diastolic systolic barbara solved / now hold all diuretics as cr is high , fu electrolyte , low salt diet , daily wt on meds / add new Imdur and hydralazine , stopped benazepril sec to high cr . chr fu out pt 3.1 cm heterogeneous lesion in the interpolar region of the right kidney   concerning for malignancy, better seen on abdominal MRI performed on   2/11/2017. fu out pt urologist  for further work up . on ckd 4 ,   hold ace inhibitor type2 hb a1c 6.3 continue dm  diet control / fu repeat hb a1c 3 month

## 2018-06-06 NOTE — DISCHARGE NOTE ADULT - MEDICATION SUMMARY - MEDICATIONS TO TAKE
I will START or STAY ON the medications listed below when I get home from the hospital:    finasteride 5 mg oral tablet  -- 1 tab(s) by mouth once a day  -- Indication: For BPH (benign prostatic hyperplasia)    aspirin 81 mg oral tablet, chewable  -- 1 tab(s) by mouth once a day  -- Indication: For Heart prophy     tamsulosin  -- 1  by mouth once a day (at bedtime)  -- Indication: For BPH (benign prostatic hyperplasia)    isosorbide mononitrate 120 mg oral tablet, extended release  -- 1 tab(s) by mouth once a day  -- Indication: For Hypertension    carvedilol 6.25 mg oral tablet  -- 1 tab(s) by mouth every 12 hours  -- Indication: For Htn    donepezil 10 mg oral tablet  -- 1 tab(s) by mouth once a day (at bedtime)  -- Indication: For Dementia    levothyroxine 50 mcg (0.05 mg) oral tablet  -- 1 tab(s) by mouth once a day  -- Takes in AM  -- Indication: For Hypothyroidism    hydrALAZINE 100 mg oral tablet  -- 1 tab(s) by mouth every 8 hours  -- Indication: For Htn    cholecalciferol oral tablet  -- 1000 unit(s) by mouth once a day  -- Indication: For Need for prophylactic measure

## 2018-06-06 NOTE — PROGRESS NOTE ADULT - PROVIDER SPECIALTY LIST ADULT
Cardiology
Cardiology
Critical Care
Hospitalist
MICU
MICU
Nephrology
Cardiology
Cardiology

## 2018-06-19 ENCOUNTER — NON-APPOINTMENT (OUTPATIENT)
Age: 83
End: 2018-06-19

## 2018-06-19 ENCOUNTER — APPOINTMENT (OUTPATIENT)
Dept: CARDIOLOGY | Facility: CLINIC | Age: 83
End: 2018-06-19
Payer: MEDICARE

## 2018-06-19 VITALS
WEIGHT: 135 LBS | BODY MASS INDEX: 21.19 KG/M2 | OXYGEN SATURATION: 93 % | HEART RATE: 54 BPM | SYSTOLIC BLOOD PRESSURE: 158 MMHG | DIASTOLIC BLOOD PRESSURE: 56 MMHG | HEIGHT: 67 IN

## 2018-06-19 DIAGNOSIS — I50.20 UNSPECIFIED SYSTOLIC (CONGESTIVE) HEART FAILURE: ICD-10-CM

## 2018-06-19 PROCEDURE — 93000 ELECTROCARDIOGRAM COMPLETE: CPT

## 2018-06-19 PROCEDURE — 99214 OFFICE O/P EST MOD 30 MIN: CPT

## 2018-06-19 RX ORDER — ISOSORBIDE MONONITRATE 120 MG/1
120 TABLET, EXTENDED RELEASE ORAL
Qty: 30 | Refills: 0 | Status: ACTIVE | COMMUNITY
Start: 2018-06-06

## 2018-06-19 RX ORDER — BENAZEPRIL HYDROCHLORIDE 5 MG/1
TABLET ORAL
Refills: 0 | Status: DISCONTINUED | COMMUNITY
End: 2018-06-19

## 2018-06-19 RX ORDER — LEVOTHYROXINE SODIUM 0.07 MG/1
75 TABLET ORAL
Qty: 30 | Refills: 0 | Status: ACTIVE | COMMUNITY
Start: 2018-04-19

## 2018-06-19 RX ORDER — HYDRALAZINE HYDROCHLORIDE 100 MG/1
100 TABLET ORAL
Qty: 90 | Refills: 0 | Status: ACTIVE | COMMUNITY
Start: 2018-06-06

## 2018-06-19 RX ORDER — TRIAMTERENE AND HYDROCHLOROTHIAZIDE 37.5; 25 MG/1; MG/1
37.5-25 CAPSULE ORAL
Refills: 0 | Status: DISCONTINUED | COMMUNITY
End: 2018-06-19

## 2018-06-19 RX ORDER — FINASTERIDE 5 MG/1
5 TABLET, FILM COATED ORAL
Qty: 30 | Refills: 0 | Status: ACTIVE | COMMUNITY
Start: 2017-10-17

## 2018-06-25 ENCOUNTER — APPOINTMENT (OUTPATIENT)
Dept: CARDIOLOGY | Facility: CLINIC | Age: 83
End: 2018-06-25
Payer: MEDICARE

## 2018-06-25 PROCEDURE — 93306 TTE W/DOPPLER COMPLETE: CPT

## 2018-06-27 LAB
ALBUMIN SERPL ELPH-MCNC: 2.9 G/DL
ALP BLD-CCNC: 45 U/L
ALT SERPL-CCNC: 7 U/L
ANION GAP SERPL CALC-SCNC: 16 MMOL/L
AST SERPL-CCNC: 17 U/L
BILIRUB SERPL-MCNC: 0.3 MG/DL
BUN SERPL-MCNC: 35 MG/DL
CALCIUM SERPL-MCNC: 8.7 MG/DL
CHLORIDE SERPL-SCNC: 94 MMOL/L
CO2 SERPL-SCNC: 29 MMOL/L
CREAT SERPL-MCNC: 2.28 MG/DL
GLUCOSE SERPL-MCNC: 204 MG/DL
POTASSIUM SERPL-SCNC: 3.5 MMOL/L
PROT SERPL-MCNC: 6.1 G/DL
SODIUM SERPL-SCNC: 139 MMOL/L

## 2018-07-22 NOTE — PROGRESS NOTE ADULT - PROBLEM SELECTOR PLAN 1
improving with diuresis and BP control  continue to titrate hydralazine and isosorbide per cardiology  continue coreg with parameters (would not increase as HR is already on slow side)  no ACEI or ARB with LIT  continue aspirin and statin
Additional Progress Note...

## 2018-08-03 ENCOUNTER — APPOINTMENT (OUTPATIENT)
Dept: CARDIOLOGY | Facility: CLINIC | Age: 83
End: 2018-08-03
Payer: MEDICARE

## 2018-08-03 VITALS
BODY MASS INDEX: 21.35 KG/M2 | DIASTOLIC BLOOD PRESSURE: 58 MMHG | OXYGEN SATURATION: 95 % | HEART RATE: 54 BPM | WEIGHT: 136 LBS | HEIGHT: 67 IN | SYSTOLIC BLOOD PRESSURE: 166 MMHG

## 2018-08-03 DIAGNOSIS — I50.32 CHRONIC DIASTOLIC (CONGESTIVE) HEART FAILURE: ICD-10-CM

## 2018-08-03 PROBLEM — N28.1 CYST OF KIDNEY, ACQUIRED: Chronic | Status: ACTIVE | Noted: 2017-04-10

## 2018-08-03 PROBLEM — Z91.89 OTHER SPECIFIED PERSONAL RISK FACTORS, NOT ELSEWHERE CLASSIFIED: Chronic | Status: ACTIVE | Noted: 2017-04-10

## 2018-08-03 PROCEDURE — 99214 OFFICE O/P EST MOD 30 MIN: CPT

## 2018-08-06 ENCOUNTER — RX RENEWAL (OUTPATIENT)
Age: 83
End: 2018-08-06

## 2018-08-09 ENCOUNTER — RX RENEWAL (OUTPATIENT)
Age: 83
End: 2018-08-09

## 2018-08-09 RX ORDER — FUROSEMIDE 40 MG/1
40 TABLET ORAL DAILY
Qty: 90 | Refills: 3 | Status: ACTIVE | COMMUNITY
Start: 2018-06-15 | End: 1900-01-01

## 2018-10-04 ENCOUNTER — NON-APPOINTMENT (OUTPATIENT)
Age: 83
End: 2018-10-04

## 2018-10-04 ENCOUNTER — APPOINTMENT (OUTPATIENT)
Dept: CARDIOLOGY | Facility: CLINIC | Age: 83
End: 2018-10-04
Payer: MEDICARE

## 2018-10-04 VITALS
HEIGHT: 67 IN | HEART RATE: 47 BPM | SYSTOLIC BLOOD PRESSURE: 182 MMHG | WEIGHT: 134 LBS | OXYGEN SATURATION: 100 % | BODY MASS INDEX: 21.03 KG/M2 | DIASTOLIC BLOOD PRESSURE: 67 MMHG

## 2018-10-04 DIAGNOSIS — R06.02 SHORTNESS OF BREATH: ICD-10-CM

## 2018-10-04 PROCEDURE — 93000 ELECTROCARDIOGRAM COMPLETE: CPT

## 2018-10-04 PROCEDURE — 99214 OFFICE O/P EST MOD 30 MIN: CPT

## 2019-02-05 ENCOUNTER — APPOINTMENT (OUTPATIENT)
Dept: CARDIOLOGY | Facility: CLINIC | Age: 84
End: 2019-02-05
Payer: MEDICARE

## 2019-02-05 ENCOUNTER — NON-APPOINTMENT (OUTPATIENT)
Age: 84
End: 2019-02-05

## 2019-02-05 ENCOUNTER — APPOINTMENT (OUTPATIENT)
Dept: CARDIOLOGY | Facility: CLINIC | Age: 84
End: 2019-02-05

## 2019-02-05 VITALS
DIASTOLIC BLOOD PRESSURE: 59 MMHG | WEIGHT: 138 LBS | HEART RATE: 49 BPM | BODY MASS INDEX: 21.66 KG/M2 | HEIGHT: 67 IN | SYSTOLIC BLOOD PRESSURE: 172 MMHG | OXYGEN SATURATION: 96 %

## 2019-02-05 DIAGNOSIS — I42.9 CARDIOMYOPATHY, UNSPECIFIED: ICD-10-CM

## 2019-02-05 PROCEDURE — 93000 ELECTROCARDIOGRAM COMPLETE: CPT

## 2019-02-05 PROCEDURE — 99214 OFFICE O/P EST MOD 30 MIN: CPT

## 2019-02-05 NOTE — PHYSICAL EXAM
[General Appearance - Well Developed] : well developed [Normal Appearance] : normal appearance [Well Groomed] : well groomed [General Appearance - Well Nourished] : well nourished [No Deformities] : no deformities [General Appearance - In No Acute Distress] : no acute distress [Normal Conjunctiva] : the conjunctiva exhibited no abnormalities [Eyelids - No Xanthelasma] : the eyelids demonstrated no xanthelasmas [Normal Oral Mucosa] : normal oral mucosa [No Oral Pallor] : no oral pallor [No Oral Cyanosis] : no oral cyanosis [Normal Jugular Venous A Waves Present] : normal jugular venous A waves present [Normal Jugular Venous V Waves Present] : normal jugular venous V waves present [No Jugular Venous Dubois A Waves] : no jugular venous dubois A waves [Respiration, Rhythm And Depth] : normal respiratory rhythm and effort [Exaggerated Use Of Accessory Muscles For Inspiration] : no accessory muscle use [Auscultation Breath Sounds / Voice Sounds] : lungs were clear to auscultation bilaterally [Abdomen Soft] : soft [Abdomen Tenderness] : non-tender [Abdomen Mass (___ Cm)] : no abdominal mass palpated [Abnormal Walk] : normal gait [Gait - Sufficient For Exercise Testing] : the gait was sufficient for exercise testing [Nail Clubbing] : no clubbing of the fingernails [Cyanosis, Localized] : no localized cyanosis [Petechial Hemorrhages (___cm)] : no petechial hemorrhages [Skin Color & Pigmentation] : normal skin color and pigmentation [] : no rash [No Venous Stasis] : no venous stasis [Skin Lesions] : no skin lesions [No Skin Ulcers] : no skin ulcer [No Xanthoma] : no  xanthoma was observed [Oriented To Time, Place, And Person] : oriented to person, place, and time [Affect] : the affect was normal [Mood] : the mood was normal [No Anxiety] : not feeling anxious [Normal Rate] : normal [Normal S1] : normal S1 [Normal S2] : normal S2 [S3] : no S3 [S4] : no S4 [No Murmur] : no murmurs heard [Right Carotid Bruit] : no bruit heard over the right carotid [Left Carotid Bruit] : no bruit heard over the left carotid [Right Femoral Bruit] : no bruit heard over the right femoral artery [Left Femoral Bruit] : no bruit heard over the left femoral artery [2+] : left 2+ [No Abnormalities] : the abdominal aorta was not enlarged and no bruit was heard [___ +] : bilateral [unfilled]U+ pretibial pitting edema

## 2019-02-05 NOTE — HISTORY OF PRESENT ILLNESS
[FreeTextEntry1] : Mr. Murillo is a pleasant 89 year old man with a history of hypertension, newly diagnosed diabetes, dementia, who was recently admitted to [facility] on 6/2-6/8/2018 with volume overload, shortness of breath and hypertensive urgency. \par He required IV diuresis and treatment of his HTN. Echo with moderate LV systolic dysfunction with mild-moderate MR.\par I last saw him 10/4/2018. He went for an echocardiogram which showed an Ef of 62%, mild mitral regurgitation, mild aortic regurgitation, mild aortic regurgitation, mild left atrial enlargement, mild concentric LVH, mild diastolic dysfunction and normal pulmonary pressures.\par \par Overall, he is feeling well. His appetite has improved, and he has been walking around the house and block. He denies shortness of breath, chest pain, dizziness, lightheadedness and near-syncope. He also denies orthopnea and PND\par \par His creatinine at time of discharge was 2.8. It was 2.28 when I checked a few months ago\par He currently takes asa 81, imdur 120, coreg 6.25 mg po bid, hydralazine 100 tid. He was sent home off of diuretics and ace-inhibitor.\par He had an ekg at PMD that showed sinus bradycardia with PVCs.

## 2019-02-05 NOTE — REASON FOR VISIT
[Follow-Up - Clinic] : a clinic follow-up of [Heart Failure] : congestive heart failure [Family Member] : family member

## 2019-02-05 NOTE — DISCUSSION/SUMMARY
[___ Month(s)] : [unfilled] month(s) [With Me] : with me [FreeTextEntry1] : Mr. Murillo is a 89 year old man with a history of hypertension, newly diagnosed diabetes, dementia, who was recently admitted to [facility] on 6/2-6/8/2018 with volume overload, shortness of breath and hypertensive urgency. \par He is feeling well today, though his BP remains elevated.\par \par Repeat echocardiogram with mild MR, and resolution of his LV dysfunction. There was mild diastolic dysfunction, presumably related to his uncontrolled HTN.\par \par For now, I will continue his current regimen and doses of Coreg, Hydralazine, and Imdur. He will also continue taking lasix 40 mg po daily and watch his daily weights.  \par \par I have recommended that his daughter check his BPs at home. If they remain in the 160-180 range, I will need to add another agent. Given his renal function, I am hesitant to add and ace/arb or diuretic. The options are significantly limited: amlodipine has caused him to have swelling. He is too bradycardic at baseline to consider clonidine or to increase coreg. We can try nifedipine, or later cardura.\par She will call me in 2 weeks with BP results.\par He will continue taking ASA 81 mg po daily, though has not been on a cholesterol pill per personal choice.\par He is off his ace-inhibitor. \par He will follow up with me in 3 months. We will speak regarding his blood pressures in the short term.

## 2019-05-14 ENCOUNTER — NON-APPOINTMENT (OUTPATIENT)
Age: 84
End: 2019-05-14

## 2019-05-14 ENCOUNTER — APPOINTMENT (OUTPATIENT)
Dept: CARDIOLOGY | Facility: CLINIC | Age: 84
End: 2019-05-14
Payer: MEDICARE

## 2019-05-14 VITALS — DIASTOLIC BLOOD PRESSURE: 75 MMHG | SYSTOLIC BLOOD PRESSURE: 178 MMHG

## 2019-05-14 VITALS
BODY MASS INDEX: 21.19 KG/M2 | HEART RATE: 42 BPM | OXYGEN SATURATION: 97 % | WEIGHT: 135 LBS | DIASTOLIC BLOOD PRESSURE: 66 MMHG | HEIGHT: 67 IN | SYSTOLIC BLOOD PRESSURE: 219 MMHG

## 2019-05-14 DIAGNOSIS — R00.1 BRADYCARDIA, UNSPECIFIED: ICD-10-CM

## 2019-05-14 DIAGNOSIS — I10 ESSENTIAL (PRIMARY) HYPERTENSION: ICD-10-CM

## 2019-05-14 DIAGNOSIS — R94.31 ABNORMAL ELECTROCARDIOGRAM [ECG] [EKG]: ICD-10-CM

## 2019-05-14 PROCEDURE — 93000 ELECTROCARDIOGRAM COMPLETE: CPT

## 2019-05-14 PROCEDURE — 99214 OFFICE O/P EST MOD 30 MIN: CPT

## 2019-05-14 RX ORDER — GABAPENTIN 300 MG/1
300 CAPSULE ORAL
Qty: 90 | Refills: 0 | Status: ACTIVE | COMMUNITY
Start: 2019-03-15

## 2019-05-14 RX ORDER — DONEPEZIL HYDROCHLORIDE 10 MG/1
10 TABLET ORAL
Qty: 90 | Refills: 0 | Status: ACTIVE | COMMUNITY
Start: 2019-04-03

## 2019-05-14 RX ORDER — LEVOTHYROXINE SODIUM 0.09 MG/1
88 TABLET ORAL
Qty: 90 | Refills: 0 | Status: ACTIVE | COMMUNITY
Start: 2019-04-24

## 2019-05-14 NOTE — HISTORY OF PRESENT ILLNESS
[FreeTextEntry1] : Mr. Murillo is a pleasant 89 year old man with a history of hypertension, newly diagnosed diabetes, dementia, who was recently admitted to [facility] on 6/2-6/8/2018 with volume overload, shortness of breath and hypertensive urgency. \par He required IV diuresis and treatment of his HTN. Echo with moderate LV systolic dysfunction with mild-moderate MR.\par I last saw him 2/2019. He went for an echocardiogram which showed an Ef of 62%, mild mitral regurgitation, mild aortic regurgitation, mild aortic regurgitation, mild left atrial enlargement, mild concentric LVH, mild diastolic dysfunction and normal pulmonary pressures.\par \par Overall, he is feeling well. His appetite has improved, and he has been walking around the house and block. He denies shortness of breath, chest pain, dizziness, lightheadedness and near-syncope. He also denies orthopnea and PND. His daughter's main complaint is that he is fatigued, and that he falls asleep at dinner.\par \par His most recent creatinine was 2.28\par He currently takes asa 81, imdur 120, coreg 6.25 mg po bid, hydralazine 100 tid. He was sent home off of diuretics and ace-inhibitor.\par

## 2019-05-14 NOTE — PHYSICAL EXAM
[General Appearance - Well Developed] : well developed [Normal Appearance] : normal appearance [Well Groomed] : well groomed [General Appearance - Well Nourished] : well nourished [General Appearance - In No Acute Distress] : no acute distress [No Deformities] : no deformities [Normal Conjunctiva] : the conjunctiva exhibited no abnormalities [Normal Oral Mucosa] : normal oral mucosa [Eyelids - No Xanthelasma] : the eyelids demonstrated no xanthelasmas [No Oral Pallor] : no oral pallor [No Oral Cyanosis] : no oral cyanosis [Normal Jugular Venous A Waves Present] : normal jugular venous A waves present [Normal Jugular Venous V Waves Present] : normal jugular venous V waves present [Respiration, Rhythm And Depth] : normal respiratory rhythm and effort [No Jugular Venous Dubois A Waves] : no jugular venous dubois A waves [Auscultation Breath Sounds / Voice Sounds] : lungs were clear to auscultation bilaterally [Exaggerated Use Of Accessory Muscles For Inspiration] : no accessory muscle use [Abdomen Soft] : soft [Abdomen Mass (___ Cm)] : no abdominal mass palpated [Abdomen Tenderness] : non-tender [Nail Clubbing] : no clubbing of the fingernails [Gait - Sufficient For Exercise Testing] : the gait was sufficient for exercise testing [Abnormal Walk] : normal gait [Cyanosis, Localized] : no localized cyanosis [Petechial Hemorrhages (___cm)] : no petechial hemorrhages [Skin Color & Pigmentation] : normal skin color and pigmentation [] : no rash [No Venous Stasis] : no venous stasis [No Xanthoma] : no  xanthoma was observed [No Skin Ulcers] : no skin ulcer [Skin Lesions] : no skin lesions [Oriented To Time, Place, And Person] : oriented to person, place, and time [Mood] : the mood was normal [Affect] : the affect was normal [No Anxiety] : not feeling anxious [Normal Rate] : normal [Normal S1] : normal S1 [Normal S2] : normal S2 [No Murmur] : no murmurs heard [No Abnormalities] : the abdominal aorta was not enlarged and no bruit was heard [2+] : left 2+ [___ +] : bilateral [unfilled]U+ pretibial pitting edema [S3] : no S3 [S4] : no S4 [Right Carotid Bruit] : no bruit heard over the right carotid [Left Carotid Bruit] : no bruit heard over the left carotid [Right Femoral Bruit] : no bruit heard over the right femoral artery [Left Femoral Bruit] : no bruit heard over the left femoral artery

## 2019-05-14 NOTE — DISCUSSION/SUMMARY
[With Me] : with me [___ Month(s)] : [unfilled] month(s) [FreeTextEntry1] : Mr. Murillo is a 89 year old man with a history of hypertension, newly diagnosed diabetes, dementia, who was recently admitted to [facility] on 6/2-6/8/2018 with volume overload, shortness of breath and hypertensive urgency. \par He is feeling well today, though his BP remains elevated. His daughter reports that he is tired for most of  the day, and falls asleep at dinner.\par \par He is bradycardic today, and there is a chance that his slow heart rates could be contributing to his symptoms. He will be set up for a 24 hour holter, and if his HRs are significantly on the low side, we will need to change his medication regimen around.\par \par Repeat echocardiogram with mild MR, and resolution of his LV dysfunction. There was mild diastolic dysfunction, presumably related to his uncontrolled HTN.\par \par For now, I will continue his current regimen and doses of Coreg, Hydralazine, and Imdur. He will also continue taking lasix 40 mg po daily and watch his daily weights.  I have added procardia xl 30 to the regimen. Given his renal function, I am hesitant to add an ace/arb or diuretic. He is too bradycardic at baseline to consider clonidine or to increase coreg.\par \par He will continue taking ASA 81 mg po daily, though has not been on a cholesterol pill per personal choice.\par I will call him with the holter results. He is moving to [place] next month, so we will need to set up follow up care.

## 2019-05-17 ENCOUNTER — APPOINTMENT (OUTPATIENT)
Dept: CARDIOLOGY | Facility: CLINIC | Age: 84
End: 2019-05-17
Payer: MEDICARE

## 2019-05-17 PROCEDURE — 93224 XTRNL ECG REC UP TO 48 HRS: CPT

## 2019-05-21 RX ORDER — NIFEDIPINE 30 MG/1
30 TABLET, FILM COATED, EXTENDED RELEASE ORAL
Qty: 30 | Refills: 5 | Status: DISCONTINUED | COMMUNITY
Start: 2019-05-14 | End: 2019-05-21

## 2019-06-10 NOTE — PHYSICAL THERAPY INITIAL EVALUATION ADULT - FUNCTIONAL LIMITATIONS, PT EVAL
6/9 Iron per pharmacy kinetics.  6/11 Transfused 1 uPRBC.  Transfuse 1 unit PRBC's for hemoglobin less than 7.   self-care/home management

## 2021-06-14 NOTE — DISCHARGE NOTE ADULT - MEDICATION SUMMARY - MEDICATIONS TO TAKE
4 I will START or STAY ON the medications listed below when I get home from the hospital:    finasteride 5 mg oral tablet  -- 1 tab(s) by mouth once a day  -- Indication: For Prostate enlargement     benazepril 20 mg oral tablet  -- 1 tab(s) by mouth once a day  -- Takes in AM  -- Indication: For Hypertension    tamsulosin  -- 1  by mouth once a day (at bedtime)  -- Indication: For Prostate enlargement    gabapentin 300 mg oral capsule  -- 1 cap(s) by mouth 3 times a day  -- Indication: For Neuropathy    Uloric 40 mg oral tablet  -- 1 tab(s) by mouth once a day  -- takes in AM  -- Indication: For Decrease uric acid    carvedilol 6.25 mg oral tablet  -- 1 tab(s) by mouth 2 times a day  -- Takes in AM  -- Indication: For Hypertension    amLODIPine 5 mg oral tablet  -- 1 tab(s) by mouth once a day  -- Takes in AM  -- Indication: For Hypertension    Aricept 10 mg oral tablet  -- 1 tab(s) by mouth once a day (at bedtime)  -- Indication: For for memory    docusate sodium 100 mg oral capsule  -- 1 cap(s) by mouth 2 times a day  -- Indication: For Stool softener    potassium phosphate-sodium phosphate 305 mg-700 mg oral tablet  -- 1 tab(s) by mouth 2 times a day for 3 days  -- Indication: For Hypophosphatemia    levothyroxine 50 mcg (0.05 mg) oral tablet  -- 1 tab(s) by mouth once a day  -- Takes in AM  -- Indication: For Hypothyroidism    Vitamin D3 1000 intl units oral tablet  -- 1 tab(s) by mouth once a day  -- Takes in AM  -- Indication: For vitamin D supplement

## 2021-07-29 NOTE — SWALLOW BEDSIDE ASSESSMENT ADULT - SLP GENERAL OBSERVATIONS
Patient is requesting a 90-day supply.    Pt received & seen bedside, +awake/alert, +fairly oriented, +follows commands

## 2021-08-12 NOTE — PATIENT PROFILE ADULT. - NS PRO MODE OF ARRIVAL
ambulatory
no diarrhea, no chest pain, no dizziness/no fever/no loss of consciousness/no nausea/no pain/no vomiting

## 2022-01-13 NOTE — DISCHARGE NOTE ADULT - CAREGIVER PHONE NUMBER
Ndc (200 Mg Prefilled Syringe): [address]-55 Ndc (200 Mg Prefilled Syringe): [address]-44 same as abobe

## 2022-03-29 NOTE — PATIENT PROFILE ADULT. - NSTOBACCONEVERSMOKERY/N_GEN_A
abd pain, sore throat , cough and fever x24 hours     While reviewing meds pt is supposed to be taking Lisinopril 5mg for HX of double aortic arch per [facility] HX.  Pt states he has not taken med in 4 years due to not refilling and cost of med   
Yes, Non-Core measure site...

## 2022-08-10 NOTE — PROGRESS NOTE ADULT - NSHPATTENDINGPLANDISCUSS_GEN_ALL_CORE
8/10/2022         RE: Dimitris Munoz  [address] [place] [address]        Dear Colleague,    Thank you for referring your patient, Dimitris Munoz, to the [facility]. Please see a copy of my visit note below.    [facility] Dermatology Note  Encounter Date: Aug 10, 2022  Office Visit     Dermatology Problem List:  Last skin check 8/10/22, recommended yearly.  1. Family history of melanoma (father, ; paternal aunt)  2. BCC, right lateral neck, s/p excision 2018  3. Actinic Keratosis, s/p cryotherapy   - R cheek has been treated 2x, if not resolved at next visit consider bx  4. NUB - left upper arm and mid back s/p x 8/10/22    Social History: Works as an  for [employer], rides bike to work. Has a cabin he spends time at. Wears sunscreen fairly diligently.  Family History: Father () and paternal aunt had melanoma.  ____________________________________________     ASSESSMENT/PLAN:     # History of NMSC. No evidence of recurrence.   - Recommend sunscreens SPF #30 or greater, protective clothing and avoidance of tanning beds.   - Recommended yearly skin exams.     # Family history of melanoma in a first degree relative - father (and paternal aunt)  - Recommended yearly skin checks.  - Discussed appropriate sun protective measured including sunscreen with an SPF 30 or above.    # Cherry angioma(s).    - No further intervention needed.    # Multiple clinically benign nevi.  - Signs and Symptoms of non-melanoma skin cancer and ABCDEs of melanoma reviewed with patient. Patient encouraged to perform monthly self skin exams and educated on how to perform them. UV precautions reviewed with patient. Patient was asked about new or changing moles/lesions on body.   - Sunscreen: Apply 20 minutes prior to going outdoors and reapply every two hours, when wet or sweating. We recommend using an SPF 30 or higher, and to use one that is water  resistant.       - No further intervention needed.     # Seborrheic keratosis, non irritated.   - No further intervention needed.     # Solar lentigines.  - Sunscreen: Apply 20 minutes prior to going outdoors and reapply every two hours, when wet or sweating. We recommend using an SPF 30 or higher, and to use one that is water resistant.      - No further intervention needed.     # Actinic keratosis - right cheek, mid forehead x3, left cheek  - See cryo note.   - If lesion on right cheek does not resolved with 2nd round of cryo today, will biopsy at next visit.     # Neoplasm of uncertain behavior on the left upper arm. The differential diagnosis includes BCC vs other. .  - See procedure note.     # Neoplasm of uncertain behavior on the mid back. The differential diagnosis includes NMSC vs other. .  - See procedure note.     Procedures Performed:   - Cryotherapy procedure note. After verbal consent and discussion of risks and benefits including, but not limited to, dyspigmentation/scar, blister, and pain, 5 lesion(s) was(were) treated with 1-2 mm freeze border for 1-2 cycles with liquid nitrogen. Post cryotherapy instructions were provided.    - Shave biopsy procedure note, location(s): left upper arm and mid back. After discussion of benefits and risks including but not limited to bleeding, infection, scar, incomplete removal, recurrence, and non-diagnostic biopsy, written consent and photographs were obtained. The area was cleaned with isopropyl alcohol. 0.5mL of 1% lidocaine with epinephrine was injected to obtain adequate anesthesia of lesion(s). Shave biopsy at site(s) performed. Hemostasis was achieved with aluminium chloride. Petrolatum ointment and a sterile dressing were applied. The patient tolerated the procedure and no complications were noted. The patient was provided with verbal and written post care instructions.       Follow-up: pending path results    Staff and Scribe:     Scribe Disclosure:   Alexi LAZCANO  "Jhon Ignacio, am serving as a scribe to document services personally performed by Brittney Mason PA-C, based on data collection and the provider's statements to me.  Provider Disclosure:   The documentation recorded by the scribe accurately reflects the services I personally performed and the decisions made by me.    All risks, benefits and alternatives were discussed with patient.  Patient is in agreement and understands the assessment and plan.  All questions were answered.    Brittney Mason PA-C, [facility]  [facility]: Phone: [phone], Fax: [phone]  [facility]: Phone: [phone],  Fax: [phone]  [facility]: Phone: [phone], Fax: [phone]  ____________________________________________    CC: Skin Check (Fbse, middle of back that wife noticed but not too worried \"but it's different\". Rough spots around hairline but that's normal for these visits)    HPI:  Mr. Dimitris Munoz is a(n) 58 year old male who presents today as a return patient for a skin check. Hx BCC.     Last seen on 8/19/21 with Dr. Vega for a skin check. At that time, AKs were treated with cryo.     Today, patient notes wife and daughter is concern about lesion on the mid back. Has noticed it in early June and is unchanged. Also notes concern on the hair line and right cheek. States lesion on the right cheek tends to flake and temporarily resolved with shaving. Notes dryness around the nose.     Patient is otherwise feeling well, without additional concerns.    Labs:  NA    Physical Exam:  Vitals: There were no vitals taken for this visit.  SKIN: Total skin excluding the undergarment areas was performed. The exam included the head/face, neck, both arms, chest, back, abdomen, both legs, digits and/or nails.   - Mid back: 7 mm scaly papule  - Left upper arm: 8 mm shiny papule   - Well healed scar on previous sites of " NMSC.   - There are dome shaped bright red papules on the trunk and extremities.  - Multiple regular brown pigmented macules and papules are identified on the trunk and extremities.   - Scattered brown macules on sun exposed areas.  - There are waxy stuck on tan to brown papules on the trunk and extremities..   - There are erythematous macules with overyling adherent scale on the right cheek, mid forehead x3, left cheek .   - No other lesions of concern on areas examined.     Medications:  Current Outpatient Medications   Medication     aspirin (ASA) 81 MG EC tablet     IBUPROFEN PO     indomethacin (INDOCIN) 50 MG capsule     Omeprazole (PRILOSEC PO)     No current facility-administered medications for this visit.      Past Medical History:   Patient Active Problem List   Diagnosis     CARDIOVASCULAR SCREENING; LDL GOAL LESS THAN 160     High triglycerides     FH: melanoma     Past Medical History:   Diagnosis Date     CARDIOVASCULAR SCREENING; LDL GOAL LESS THAN 160              Again, thank you for allowing me to participate in the care of your patient.        Sincerely,        Brittney Mason PA-C     gaetano

## 2022-11-21 NOTE — H&P ADULT - PROBLEM/PLAN-7
Its her left foot but I would like bilateral lower extremity EMG done.     Please let them know.     THanks,     Fanny Pichardo DPM   DISPLAY PLAN FREE TEXT

## 2022-12-09 NOTE — PATIENT PROFILE ADULT. - NS SC CAGE ALCOHOL GUILTY ABOUT
For information on Fall & Injury Prevention, visit: [email or web address] OR  [email or web address] OR  https://www.cdc.gov/steadi/patient.html
no

## 2023-08-22 NOTE — ED PROVIDER NOTE - NS ED ATTENDING STATEMENT MOD
Attending Only Skyrizi Counseling: I discussed with the patient the risks of risankizumab-rzaa including but not limited to immunosuppression, and serious infections.  The patient understands that monitoring is required including a PPD at baseline and must alert us or the primary physician if symptoms of infection or other concerning signs are noted.

## 2023-10-01 PROBLEM — K86.89 PANCREATIC MASS: Status: ACTIVE | Noted: 2017-03-01

## 2023-11-13 NOTE — CONSULT NOTE ADULT - PROVIDER SPECIALTY LIST ADULT
Family Medicine History Of Present Illness  Kostas Ruff is a 59 y.o. male presenting with renal mass.     Past Medical History  Past Medical History:   Diagnosis Date    Acute upper respiratory infection, unspecified 03/17/2020    Acute URI    Encounter for screening for malignant neoplasm of colon 05/21/2020    Screening for colorectal cancer    Personal history of other infectious and parasitic diseases 08/06/2021    History of herpes zoster       Surgical History  Past Surgical History:   Procedure Laterality Date    OTHER SURGICAL HISTORY  03/09/2020    Cyst excision    OTHER SURGICAL HISTORY  03/09/2020    Back surgery    OTHER SURGICAL HISTORY  03/09/2020    Knee surgery        Social History  He reports that he has been smoking cigarettes. He has quit using smokeless tobacco.  His smokeless tobacco use included chew. He reports current alcohol use of about 35.0 standard drinks of alcohol per week. He reports that he does not use drugs.    Family History  Family History   Problem Relation Name Age of Onset    No Known Problems Mother      No Known Problems Father          Allergies  Patient has no known allergies.       Last Recorded Vitals  There were no vitals taken for this visit.    Relevant Results             Assessment/Plan   Principal Problem:    Renal mass      Plan    Proceed to OR for open partial nephrectomy vs radical nephrectomy           Dolly Orozco MD

## 2024-04-25 NOTE — DIETITIAN INITIAL EVALUATION ADULT. - PROBLEM/PLAN-1
oriented to person, place and time , normal sensation , short and long term memory intact DISPLAY PLAN FREE TEXT
